# Patient Record
Sex: MALE | NOT HISPANIC OR LATINO | Employment: UNEMPLOYED | URBAN - METROPOLITAN AREA
[De-identification: names, ages, dates, MRNs, and addresses within clinical notes are randomized per-mention and may not be internally consistent; named-entity substitution may affect disease eponyms.]

---

## 2020-10-29 ENCOUNTER — OFFICE VISIT - RIVER FALLS (OUTPATIENT)
Dept: FAMILY MEDICINE | Facility: CLINIC | Age: 45
End: 2020-10-29

## 2020-11-05 ENCOUNTER — TRANSFERRED RECORDS (OUTPATIENT)
Dept: HEALTH INFORMATION MANAGEMENT | Facility: CLINIC | Age: 45
End: 2020-11-05

## 2020-11-17 ENCOUNTER — OFFICE VISIT - RIVER FALLS (OUTPATIENT)
Dept: FAMILY MEDICINE | Facility: CLINIC | Age: 45
End: 2020-11-17

## 2020-12-01 ENCOUNTER — OFFICE VISIT - RIVER FALLS (OUTPATIENT)
Dept: FAMILY MEDICINE | Facility: CLINIC | Age: 45
End: 2020-12-01

## 2020-12-01 ASSESSMENT — MIFFLIN-ST. JEOR: SCORE: 1341.88

## 2020-12-22 ENCOUNTER — AMBULATORY - RIVER FALLS (OUTPATIENT)
Dept: FAMILY MEDICINE | Facility: CLINIC | Age: 45
End: 2020-12-22

## 2020-12-25 LAB
A/G RATIO - HISTORICAL: 1.9 (ref 1–2.5)
ALBUMIN SERPL-MCNC: 4.3 GM/DL (ref 3.6–5.1)
ALP SERPL-CCNC: 56 UNIT/L (ref 31–125)
ALT SERPL W P-5'-P-CCNC: 9 UNIT/L (ref 6–29)
AST SERPL W P-5'-P-CCNC: 15 UNIT/L (ref 10–35)
BILIRUB SERPL-MCNC: 0.4 MG/DL (ref 0.2–1.2)
BUN SERPL-MCNC: 7 MG/DL (ref 7–25)
BUN/CREAT RATIO - HISTORICAL: NORMAL (ref 6–22)
CALCIUM SERPL-MCNC: 9.5 MG/DL (ref 8.6–10.2)
CHLORIDE BLD-SCNC: 100 MMOL/L (ref 98–110)
CHOLEST SERPL-MCNC: 148 MG/DL
CHOLEST/HDLC SERPL: 3.4 {RATIO}
CO2 SERPL-SCNC: 30 MMOL/L (ref 20–32)
CREAT SERPL-MCNC: 0.71 MG/DL (ref 0.5–1.1)
EGFRCR SERPLBLD CKD-EPI 2021: 101 ML/MIN/1.73M2
ESTRADIOL SERPL-MCNC: 863 PG/ML
GLOBULIN: 2.3 (ref 1.9–3.7)
GLUCOSE BLD-MCNC: 98 MG/DL (ref 65–99)
HDLC SERPL-MCNC: 43 MG/DL
LDLC SERPL CALC-MCNC: 85 MG/DL
NONHDLC SERPL-MCNC: 105 MG/DL
POTASSIUM BLD-SCNC: 4.1 MMOL/L (ref 3.5–5.3)
PROT SERPL-MCNC: 6.6 GM/DL (ref 6.1–8.1)
SODIUM SERPL-SCNC: 137 MMOL/L (ref 135–146)
TESTOSTERONE FREE: 0.5 PG/ML (ref 0.1–6.4)
TESTOSTERONE TOTAL: 12 NG/DL (ref 2–45)
TRIGL SERPL-MCNC: 103 MG/DL

## 2020-12-29 ENCOUNTER — OFFICE VISIT - RIVER FALLS (OUTPATIENT)
Dept: FAMILY MEDICINE | Facility: CLINIC | Age: 45
End: 2020-12-29

## 2020-12-29 ASSESSMENT — MIFFLIN-ST. JEOR: SCORE: 1357.31

## 2021-01-02 LAB
ALBUMIN SERPL-MCNC: 4.3 GM/DL (ref 3.6–5.1)
ESTRADIOL SERPL-MCNC: 1106 PG/ML
SEX HORMONE BINDING GLOBULIN: 141 NMOL/L (ref 17–124)
TESTOSTERONE FREE: 0.4 PG/ML (ref 0.2–5)
TESTOSTERONE TOTAL: 11 NG/DL (ref 2–45)
TESTOSTERONE, BIOAVAILABLE - QUEST: 0.7 NG/DL (ref 0.5–8.5)

## 2021-01-05 ENCOUNTER — COMMUNICATION - RIVER FALLS (OUTPATIENT)
Dept: FAMILY MEDICINE | Facility: CLINIC | Age: 46
End: 2021-01-05

## 2021-01-12 ENCOUNTER — OFFICE VISIT - RIVER FALLS (OUTPATIENT)
Dept: FAMILY MEDICINE | Facility: CLINIC | Age: 46
End: 2021-01-12

## 2021-01-12 ASSESSMENT — MIFFLIN-ST. JEOR: SCORE: 1375.45

## 2021-01-13 ENCOUNTER — COMMUNICATION - RIVER FALLS (OUTPATIENT)
Dept: FAMILY MEDICINE | Facility: CLINIC | Age: 46
End: 2021-01-13

## 2021-01-13 LAB — ESTRADIOL SERPL-MCNC: 545 PG/ML

## 2021-01-20 ENCOUNTER — AMBULATORY - RIVER FALLS (OUTPATIENT)
Dept: FAMILY MEDICINE | Facility: CLINIC | Age: 46
End: 2021-01-20

## 2021-01-21 ENCOUNTER — COMMUNICATION - RIVER FALLS (OUTPATIENT)
Dept: FAMILY MEDICINE | Facility: CLINIC | Age: 46
End: 2021-01-21

## 2021-01-21 LAB — ESTRADIOL SERPL-MCNC: 392 PG/ML

## 2021-01-26 ENCOUNTER — MEDICAL CORRESPONDENCE (OUTPATIENT)
Dept: HEALTH INFORMATION MANAGEMENT | Facility: CLINIC | Age: 46
End: 2021-01-26

## 2021-01-26 ENCOUNTER — OFFICE VISIT - RIVER FALLS (OUTPATIENT)
Dept: FAMILY MEDICINE | Facility: CLINIC | Age: 46
End: 2021-01-26

## 2021-01-26 ENCOUNTER — TRANSFERRED RECORDS (OUTPATIENT)
Dept: HEALTH INFORMATION MANAGEMENT | Facility: CLINIC | Age: 46
End: 2021-01-26

## 2021-01-27 LAB
A/G RATIO - HISTORICAL: 1.7 (ref 1–2.5)
ALBUMIN SERPL-MCNC: 4.3 GM/DL (ref 3.6–5.1)
ALP SERPL-CCNC: 55 UNIT/L (ref 31–125)
ALT SERPL W P-5'-P-CCNC: 7 UNIT/L (ref 6–29)
AST SERPL W P-5'-P-CCNC: 12 UNIT/L (ref 10–35)
BILIRUB DIRECT SERPL-MCNC: 0.1 MG/DL
BILIRUB INDIRECT SERPL-MCNC: 0.2 MG/DL (ref 0.2–1.2)
BILIRUB SERPL-MCNC: 0.3 MG/DL (ref 0.2–1.2)
ESTRADIOL SERPL-MCNC: 317 PG/ML
GLOBULIN: 2.6 (ref 1.9–3.7)
PROT SERPL-MCNC: 6.9 GM/DL (ref 6.1–8.1)

## 2021-01-28 ENCOUNTER — TELEPHONE (OUTPATIENT)
Dept: PLASTIC SURGERY | Facility: CLINIC | Age: 46
End: 2021-01-28

## 2021-01-28 DIAGNOSIS — F64.0 GENDER DYSPHORIA IN ADOLESCENT AND ADULT: Primary | ICD-10-CM

## 2021-01-28 NOTE — TELEPHONE ENCOUNTER
[facility]:  Care Coordination Note     SITUATION   Pt (Kim, she/her) is a 45 year old male who is receiving support for:  Care team.    BACKGROUND     Pt called regarding vaginoplasty consult. Discussed process, 2 LOS requirement. Pt to send letters and schedule consult.     ASSESSMENT     Surgery              [facility] Assessment  [facility] ([facility]) Enrollment: Enrolled  Patient has a therapist: Yes  Name of therapist: Fina Wright [place] in [place]  Letter of support #1: Requested  Letter of support #2: Requested  Surgery being considered: Yes  Vaginoplasty: Yes    Pt reports:    Smokes 5-10 cigarettes per day  No diabetes  HRT for 3 years  Previously underwent orchiectomy  Sees therapist Fina vanegas [facility] in [place]        PLAN          Nursing Interventions:  [facility] assessment completed    Follow-up plan:    1. Obtain 2 LOS    2. Schedule consult with Dr. Rosa Villarreal

## 2021-02-08 ENCOUNTER — TELEPHONE (OUTPATIENT)
Dept: PLASTIC SURGERY | Facility: CLINIC | Age: 46
End: 2021-02-08

## 2021-02-08 NOTE — TELEPHONE ENCOUNTER
Pt called regarding letters of support for vaginoplasty. Therapist Zeenat Hutson needed more information about what to include in letters of support. Pt wanted me to call on behalf of her and offer to send guideilines directly to therapist. Writer called, LVM with options on how writer could send guidelines to therapist, but did not mention the pt name due to no authorization on file to speak to therapist. Writer to send guidelines if therapist returns call.     Isis Villarreal

## 2021-02-10 ENCOUNTER — AMBULATORY - RIVER FALLS (OUTPATIENT)
Dept: FAMILY MEDICINE | Facility: CLINIC | Age: 46
End: 2021-02-10

## 2021-02-12 ENCOUNTER — OFFICE VISIT - RIVER FALLS (OUTPATIENT)
Dept: FAMILY MEDICINE | Facility: CLINIC | Age: 46
End: 2021-02-12

## 2021-02-17 ENCOUNTER — AMBULATORY - RIVER FALLS (OUTPATIENT)
Dept: FAMILY MEDICINE | Facility: CLINIC | Age: 46
End: 2021-02-17

## 2021-02-19 ENCOUNTER — AMBULATORY - RIVER FALLS (OUTPATIENT)
Dept: FAMILY MEDICINE | Facility: CLINIC | Age: 46
End: 2021-02-19

## 2021-02-20 LAB — ESTRADIOL SERPL-MCNC: 117 PG/ML

## 2021-02-23 ENCOUNTER — OFFICE VISIT - RIVER FALLS (OUTPATIENT)
Dept: FAMILY MEDICINE | Facility: CLINIC | Age: 46
End: 2021-02-23

## 2021-03-07 ENCOUNTER — HEALTH MAINTENANCE LETTER (OUTPATIENT)
Age: 46
End: 2021-03-07

## 2021-03-15 ENCOUNTER — AMBULATORY - RIVER FALLS (OUTPATIENT)
Dept: FAMILY MEDICINE | Facility: CLINIC | Age: 46
End: 2021-03-15

## 2021-03-16 LAB — ESTRADIOL SERPL-MCNC: 616 PG/ML

## 2021-03-23 ENCOUNTER — COMMUNICATION - RIVER FALLS (OUTPATIENT)
Dept: FAMILY MEDICINE | Facility: CLINIC | Age: 46
End: 2021-03-23

## 2021-03-24 ENCOUNTER — OFFICE VISIT - RIVER FALLS (OUTPATIENT)
Dept: FAMILY MEDICINE | Facility: CLINIC | Age: 46
End: 2021-03-24

## 2021-04-14 ENCOUNTER — AMBULATORY - RIVER FALLS (OUTPATIENT)
Dept: FAMILY MEDICINE | Facility: CLINIC | Age: 46
End: 2021-04-14

## 2021-04-15 ENCOUNTER — COMMUNICATION - RIVER FALLS (OUTPATIENT)
Dept: FAMILY MEDICINE | Facility: CLINIC | Age: 46
End: 2021-04-15

## 2021-04-15 LAB — ESTRADIOL SERPL-MCNC: 171 PG/ML

## 2021-04-20 ENCOUNTER — OFFICE VISIT - RIVER FALLS (OUTPATIENT)
Dept: FAMILY MEDICINE | Facility: CLINIC | Age: 46
End: 2021-04-20

## 2021-05-18 ENCOUNTER — AMBULATORY - RIVER FALLS (OUTPATIENT)
Dept: FAMILY MEDICINE | Facility: CLINIC | Age: 46
End: 2021-05-18

## 2021-05-19 LAB — ESTRADIOL SERPL-MCNC: 217 PG/ML

## 2021-05-21 ENCOUNTER — OFFICE VISIT - RIVER FALLS (OUTPATIENT)
Dept: FAMILY MEDICINE | Facility: CLINIC | Age: 46
End: 2021-05-21

## 2021-06-22 ENCOUNTER — MEDICAL CORRESPONDENCE (OUTPATIENT)
Dept: HEALTH INFORMATION MANAGEMENT | Facility: CLINIC | Age: 46
End: 2021-06-22

## 2021-07-30 ENCOUNTER — OFFICE VISIT - RIVER FALLS (OUTPATIENT)
Dept: FAMILY MEDICINE | Facility: CLINIC | Age: 46
End: 2021-07-30

## 2021-08-03 ENCOUNTER — MEDICAL CORRESPONDENCE (OUTPATIENT)
Dept: HEALTH INFORMATION MANAGEMENT | Facility: CLINIC | Age: 46
End: 2021-08-03

## 2021-08-03 ENCOUNTER — TRANSFERRED RECORDS (OUTPATIENT)
Dept: HEALTH INFORMATION MANAGEMENT | Facility: CLINIC | Age: 46
End: 2021-08-03

## 2021-08-05 ENCOUNTER — TELEPHONE (OUTPATIENT)
Dept: PLASTIC SURGERY | Facility: CLINIC | Age: 46
End: 2021-08-05

## 2021-08-05 ENCOUNTER — OFFICE VISIT - RIVER FALLS (OUTPATIENT)
Dept: FAMILY MEDICINE | Facility: CLINIC | Age: 46
End: 2021-08-05

## 2021-08-05 NOTE — TELEPHONE ENCOUNTER
Received a call from Dr. Kaylan Neville at [facility] regarding pt's 2 LOS for bottom surgery. Writer called Dr. Neville back, [facility], let them know that [facility] received one LOS from Dr. Vincent, don't have 2nd LOS. Writer send pt mychart message explaining that pt is onw aitlist to see Dr. Lucero. Once there are openings, pt can see Nic even if pt does not have 2nd LOS.     Isis Villarreal

## 2021-08-06 LAB — ESTRADIOL SERPL-MCNC: 50 PG/ML

## 2021-08-08 LAB
ALBUMIN SERPL-MCNC: 4.8 GM/DL (ref 3.6–5.1)
SEX HORMONE BINDING GLOBULIN: 124 NMOL/L (ref 17–124)
TESTOSTERONE FREE: 0.4 PG/ML (ref 0.2–5)
TESTOSTERONE TOTAL: 11 NG/DL (ref 2–45)
TESTOSTERONE, BIOAVAILABLE - QUEST: 0.9 NG/DL (ref 0.5–8.5)

## 2021-08-12 ENCOUNTER — OFFICE VISIT - RIVER FALLS (OUTPATIENT)
Dept: FAMILY MEDICINE | Facility: CLINIC | Age: 46
End: 2021-08-12

## 2021-09-03 ENCOUNTER — OFFICE VISIT - RIVER FALLS (OUTPATIENT)
Dept: FAMILY MEDICINE | Facility: CLINIC | Age: 46
End: 2021-09-03

## 2021-09-08 ENCOUNTER — AMBULATORY - RIVER FALLS (OUTPATIENT)
Dept: FAMILY MEDICINE | Facility: CLINIC | Age: 46
End: 2021-09-08

## 2021-09-09 LAB — ESTRADIOL SERPL-MCNC: 223 PG/ML

## 2021-09-10 ENCOUNTER — OFFICE VISIT - RIVER FALLS (OUTPATIENT)
Dept: FAMILY MEDICINE | Facility: CLINIC | Age: 46
End: 2021-09-10

## 2021-09-14 ENCOUNTER — COMMUNICATION - RIVER FALLS (OUTPATIENT)
Dept: FAMILY MEDICINE | Facility: CLINIC | Age: 46
End: 2021-09-14

## 2021-10-11 ENCOUNTER — HEALTH MAINTENANCE LETTER (OUTPATIENT)
Age: 46
End: 2021-10-11

## 2021-10-13 ENCOUNTER — TELEPHONE (OUTPATIENT)
Dept: PLASTIC SURGERY | Facility: CLINIC | Age: 46
End: 2021-10-13

## 2021-10-13 NOTE — TELEPHONE ENCOUNTER
Writer LVM for pt regarding scheduling with Nic for vaginoplasty consultation.     Isis Villarreal

## 2021-11-16 ENCOUNTER — TELEPHONE (OUTPATIENT)
Dept: PLASTIC SURGERY | Facility: CLINIC | Age: 46
End: 2021-11-16
Payer: COMMERCIAL

## 2021-11-16 NOTE — TELEPHONE ENCOUNTER
[facility] :  Care Coordination Note     SITUATION   Pt (Kim, she/her) is a 46 year old adult who is receiving support for:  No chief complaint on file.  .    BACKGROUND     Writer called pt regarding scheduling vaginoplasty consultation. Pt was previously on waitlist. Scheduled consultation with Nic as well as rbeast aug consultation with Issac.     ASSESSMENT     Surgery              C[facility] Assessment  [facility] [place]: En[place]  Patient has a therapist: Yes  Name of therapist: Fina cline [place]  Letter of support #1: (P) Received  Letter #1 Date: (P) 06/28/21  Letter of support #2: (P) Received  Letter #2 Date: (P) 08/12/21  Surgery being considered: (P) Yes  Augmentation: (P) Yes  Hormones at least 12mo: (P) Yes  Vaginoplasty: (P) Yes          PLAN          Nursing Interventions:  [facility] [place] completed    Follow-up plan:    1. Consultations       Isis Villarreal

## 2021-11-17 NOTE — TELEPHONE ENCOUNTER
FUTURE VISIT INFORMATION      FUTURE VISIT INFORMATION:    Date: 1/11/22    Time: 2:00pm    [place] [facility]  REFERRAL INFORMATION:    Referring provider:  self    Referring providers clinic:  N/A    Reason for visit/diagnosis  new vaginoplasty, needs hair removal referral    RECORDS REQUESTED FROM:       No recs to collect

## 2021-11-17 NOTE — TELEPHONE ENCOUNTER
FUTURE VISIT INFORMATION      FUTURE VISIT INFORMATION:    Date: 1/23/22    Time: 12:30pm    Location: [facility] [place] [facility]  REFERRAL INFORMATION:    Referring provider:  self    Referring providers clinic:  N/A    Reason for visit/diagnosis  Top consult    RECORDS REQUESTED FROM:       No recs to collect

## 2021-11-18 ENCOUNTER — OFFICE VISIT - RIVER FALLS (OUTPATIENT)
Dept: FAMILY MEDICINE | Facility: CLINIC | Age: 46
End: 2021-11-18

## 2021-11-19 LAB — ESTRADIOL SERPL-MCNC: 32 PG/ML

## 2021-11-26 ENCOUNTER — OFFICE VISIT - RIVER FALLS (OUTPATIENT)
Dept: FAMILY MEDICINE | Facility: CLINIC | Age: 46
End: 2021-11-26

## 2021-11-27 LAB — ESTRADIOL SERPL-MCNC: 60 PG/ML

## 2021-11-30 ENCOUNTER — COMMUNICATION - RIVER FALLS (OUTPATIENT)
Dept: FAMILY MEDICINE | Facility: CLINIC | Age: 46
End: 2021-11-30

## 2021-12-03 ENCOUNTER — OFFICE VISIT - RIVER FALLS (OUTPATIENT)
Dept: FAMILY MEDICINE | Facility: CLINIC | Age: 46
End: 2021-12-03

## 2021-12-05 ENCOUNTER — HEALTH MAINTENANCE LETTER (OUTPATIENT)
Age: 46
End: 2021-12-05

## 2022-01-07 ENCOUNTER — AMBULATORY - RIVER FALLS (OUTPATIENT)
Dept: FAMILY MEDICINE | Facility: CLINIC | Age: 47
End: 2022-01-07
Payer: COMMERCIAL

## 2022-01-08 LAB — ESTRADIOL SERPL-MCNC: 71 PG/ML

## 2022-01-11 ENCOUNTER — OFFICE VISIT (OUTPATIENT)
Dept: PLASTIC SURGERY | Facility: CLINIC | Age: 47
End: 2022-01-11
Payer: COMMERCIAL

## 2022-01-11 ENCOUNTER — PRE VISIT (OUTPATIENT)
Dept: PLASTIC SURGERY | Facility: CLINIC | Age: 47
End: 2022-01-11
Payer: COMMERCIAL

## 2022-01-11 VITALS
SYSTOLIC BLOOD PRESSURE: 107 MMHG | WEIGHT: 142 LBS | BODY MASS INDEX: 22.29 KG/M2 | HEIGHT: 67 IN | OXYGEN SATURATION: 96 % | HEART RATE: 86 BPM | DIASTOLIC BLOOD PRESSURE: 67 MMHG

## 2022-01-11 DIAGNOSIS — F64.9 GENDER DYSPHORIA: Primary | ICD-10-CM

## 2022-01-11 PROCEDURE — 99205 OFFICE O/P NEW HI 60 MIN: CPT | Performed by: UROLOGY

## 2022-01-11 RX ORDER — ESTRADIOL 0.5 MG/1
0.5 TABLET ORAL DAILY
COMMUNITY
End: 2022-03-31

## 2022-01-11 ASSESSMENT — PAIN SCALES - GENERAL: PAINLEVEL: NO PAIN (0)

## 2022-01-11 ASSESSMENT — MIFFLIN-ST. JEOR: SCORE: 1472.74

## 2022-01-11 NOTE — PROGRESS NOTES
[facility] Consult H&P    Name: Madi Padilla    MRN: [id number]   YOB: 1975                 Chief Complaint:   Gender Dysphoria          History of Present Illness:   Madi Padilla is a 46 year old transgender female seen in consultation for gender dysphoria    Patient has been living as a female for 5 years.  Preferred pronouns are: she/her  The patient has been on exogenous hormones since: 6-7 years.  In terms of an intimate relationship, the patient is in a relationship with a transfemale.  In terms of fertility, the patient: prior orchi    The patient has obtained two letters of support dated August 2021    The patient has previously undergone orchiectomy many years ago. This was performed in [place].    Long-term surgical goals for the patient include: vaginoplasty    The patient is here today expressing interest in vaginoplasty.         Past Medical History:   No past medical history on file.         Past Surgical History:   No past surgical history on file.         Social History:     Social History     Tobacco Use     Smoking status: Not on file     Smokeless tobacco: Not on file   Substance Use Topics     Alcohol use: Not on file            Family History:   No family history on file.         Allergies:   Not on File         Medications:     No current outpatient medications on file.     No current facility-administered medications for this visit.          Physical Exam:   There were no vitals taken for this visit.  General: age-appropriate in NAD  HEENT: Head AT/NC, EOMI, CN Grossly intact  Resp: no respiratory distress, lung sounds clear.  CV: heart rate regular, S1, S2.  Lymph: No cervical, supraclavicular or axillary lymphadenopathy  Back: bony spine is non-tender, flanks are nontender  Abdomen: notobese, soft, non-distended, non-tender. No organomegaly  : circumcised, descended testicles. Hirsute genitalia.  LE: no edema.   Neuro: grossly intact  Motor:  excellent strength throughout  Skin: clear of rashes or ecchymoses.        Labs:    All laboratory data reviewed with patient  Significant for none      Office Studies:    none      Imaging:    All imaging reviewed with patient.  Significant for none      Outside records:    I spent 10 minutes reviewing outside records.         Assessment and Plan:   46 year old transgender female with:  1. Gender Dysphoria    Patient desires vaginoplasty however is not sure if she would prefer a minimal depth or full depth. She has already had an orchiectomy.    She has a persistent, well documented gender dysphoria. She has capacity to make a fully informed decision and to consent for treatment. Her mental health issues are well controlled. She has been on continuous hormones for years. She two letters of support.     The patient meets all of these criteria. We discussed that gender affirmation surgery should be considered permanent. We discussed risks/complications of rectal injury, rectovaginal fistula, bleeding, fluid collection, infection, injury to surrounding structures, flap loss, sensory loss, wound dehiscence, vaginal prolapse, vaginal shrinkage/stenosis, need for lifelong dilation, urinary stream abnormalities, DVT/PE and need for revision surgery.     We also discussed the need to stop hormones sary-procedurally for 2 weeks before and after surgery.     We discussed that transgender vaginoplasty for this patient would include: penectomy,  clitoroplasty, labiaplasty, urethral reconstruction, creation of a vagina, skin graft, colpopexy to suspend the vagina, and scrotectomy.     needs hair removal if wants to do full depth    needs prior auth    Is going to see plastics for breast reconstruction tomorrow. She would proceed with top surgery prior to bottom surgery.     Follow up in 6 months after top surgery to discuss again.  Leaning towards minimal depth after today's discussion. Should have top surgery first    Also  seeing her partner, a transgender woman who also wants a minimal depth vaginoplasty.    Golden Lucero MD   Reconstructive Urology  [facility]      60 minutes spent on the date of the encounter doing chart review, history and exam, documentation and further activities per the note

## 2022-01-11 NOTE — LETTER
Date:January 26, 2022      Patient was self referred, no letter generated. Do not send.        [facility] Health Information

## 2022-01-11 NOTE — LETTER
1/11/2022       RE: Madi Padilla  [address]  [place] [address]     Dear Colleague,    Thank you for referring your patient, Madi Padilla, to the [facility] PLASTIC AND RECONSTRUCTIVE SURGERY CLINIC [place] at [facility]. Please see a copy of my visit note below.    [facility] Consult H&P    Name: Madi Padilla    MRN: [id number]   YOB: 1975                 Chief Complaint:   Gender Dysphoria          History of Present Illness:   Madi Padilla is a 46 year old transgender female seen in consultation for gender dysphoria    Patient has been living as a female for 5 years.  Preferred pronouns are: she/her  The patient has been on exogenous hormones since: 6-7 years.  In terms of an intimate relationship, the patient is in a relationship with a transfemale.  In terms of fertility, the patient: prior orchi    The patient has obtained two letters of support dated August 2021    The patient has previously undergone orchiectomy many years ago. This was performed in [place].    Long-term surgical goals for the patient include: vaginoplasty    The patient is here today expressing interest in vaginoplasty.         Past Medical History:   No past medical history on file.         Past Surgical History:   No past surgical history on file.         Social History:     Social History     Tobacco Use     Smoking status: Not on file     Smokeless tobacco: Not on file   Substance Use Topics     Alcohol use: Not on file            Family History:   No family history on file.         Allergies:   Not on File         Medications:     No current outpatient medications on file.     No current facility-administered medications for this visit.          Physical Exam:   There were no vitals taken for this visit.  General: age-appropriate in NAD  HEENT: Head AT/NC, EOMI, CN Grossly intact  Resp: no respiratory distress,  lung sounds clear.  CV: heart rate regular, S1, S2.  Lymph: No cervical, supraclavicular or axillary lymphadenopathy  Back: bony spine is non-tender, flanks are nontender  Abdomen: notobese, soft, non-distended, non-tender. No organomegaly  : circumcised, descended testicles. Hirsute genitalia.  LE: no edema.   Neuro: grossly intact  Motor: excellent strength throughout  Skin: clear of rashes or ecchymoses.        Labs:    All laboratory data reviewed with patient  Significant for none      Office Studies:    none      Imaging:    All imaging reviewed with patient.  Significant for none      Outside records:    I spent 10 minutes reviewing outside records.         Assessment and Plan:   46 year old transgender female with:  1. Gender Dysphoria    Patient desires vaginoplasty however is not sure if she would prefer a minimal depth or full depth. She has already had an orchiectomy.    She has a persistent, well documented gender dysphoria. She has capacity to make a fully informed decision and to consent for treatment. Her mental health issues are well controlled. She has been on continuous hormones for years. She two letters of support.     The patient meets all of these criteria. We discussed that gender affirmation surgery should be considered permanent. We discussed risks/complications of rectal injury, rectovaginal fistula, bleeding, fluid collection, infection, injury to surrounding structures, flap loss, sensory loss, wound dehiscence, vaginal prolapse, vaginal shrinkage/stenosis, need for lifelong dilation, urinary stream abnormalities, DVT/PE and need for revision surgery.     We also discussed the need to stop hormones sary-procedurally for 2 weeks before and after surgery.     We discussed that transgender vaginoplasty for this patient would include: penectomy,  clitoroplasty, labiaplasty, urethral reconstruction, creation of a vagina, skin graft, colpopexy to suspend the vagina, and scrotectomy.      needs hair removal if wants to do full depth    needs prior auth    Is going to see plastics for breast reconstruction tomorrow. She would proceed with top surgery prior to bottom surgery.     Follow up in 6 months after top surgery to discuss again.  Leaning towards minimal depth after today's discussion. Should have top surgery first    Also seeing her partner, a transgender woman who also wants a minimal depth vaginoplasty.    Golden Lucero MD   Reconstructive Urology  [facility]      60 minutes spent on the date of the encounter doing chart review, history and exam, documentation and further activities per the note                   Again, thank you for allowing me to participate in the care of your patient.      Sincerely,    Golden Lucero MD

## 2022-01-11 NOTE — NURSING NOTE
"Chief Complaint   Patient presents with     Consult     New pt consult -- vaginoplasty       Vitals:    01/11/22 1307   BP: 107/67   Pulse: 86   SpO2: 96%   Weight: 64.4 kg (142 lb)   Height: 1.702 m (5' 7\")       Body mass index is 22.24 kg/m .          DUY ANTOINE EMT    "

## 2022-01-12 ENCOUNTER — OFFICE VISIT (OUTPATIENT)
Dept: PLASTIC SURGERY | Facility: CLINIC | Age: 47
End: 2022-01-12
Payer: COMMERCIAL

## 2022-01-12 ENCOUNTER — PRE VISIT (OUTPATIENT)
Dept: PLASTIC SURGERY | Facility: CLINIC | Age: 47
End: 2022-01-12

## 2022-01-12 VITALS
DIASTOLIC BLOOD PRESSURE: 71 MMHG | HEIGHT: 67 IN | SYSTOLIC BLOOD PRESSURE: 101 MMHG | WEIGHT: 142 LBS | OXYGEN SATURATION: 100 % | BODY MASS INDEX: 22.29 KG/M2 | HEART RATE: 86 BPM

## 2022-01-12 DIAGNOSIS — F64.9 GENDER DYSPHORIA: Primary | ICD-10-CM

## 2022-01-12 PROCEDURE — 99203 OFFICE O/P NEW LOW 30 MIN: CPT | Performed by: STUDENT IN AN ORGANIZED HEALTH CARE EDUCATION/TRAINING PROGRAM

## 2022-01-12 ASSESSMENT — MIFFLIN-ST. JEOR: SCORE: 1472.74

## 2022-01-12 ASSESSMENT — PAIN SCALES - GENERAL: PAINLEVEL: NO PAIN (0)

## 2022-01-12 NOTE — LETTER
Date:January 27, 2022      Patient was self referred, no letter generated. Do not send.        [facility] Health Information

## 2022-01-12 NOTE — PROGRESS NOTES
"PRS    HPI: 46-year-old transfemale presenting for chest feminization consult.  Patient has been socially transitioning for greater than 10 years, and on estradiol hormone replacement therapy for 6 years.  They have a therapist with a letter of support for chest surgery.  Patient denies any nipple discharge, masses or lumps, or armpit swelling.  She has not had any prior breast surgery.  No recent mammogram.  Patient is a 0.5 pack/day smoker, but is willing to quit.  She is in the clinic room with her partner who is also transfemale.  Goals for chest feminization surgery in the patient's word include making a more body appropriate breast mound and hopefully around the size of a C cup.  Of note, believes that her breast growth has plateaued for the last several years.    ROS: Negative, see HPI  Past medical history: Gender dysphoria, asthma  Past surgical history: No surgeries to the breast or chest  Medications: Albuterol, estradiol  Allergies: Penicillins  Social history: 0.5 pack/day smoker, but no other nicotine or tobacco use  Family history: No bleeding or clotting problems, or issues with anesthesia    Examination:  /71 (BP Location: Left arm, Patient Position: Sitting, Cuff Size: Adult Regular)   Pulse 86   Ht 1.702 m (5' 7\")   Wt 64.4 kg (142 lb)   SpO2 100%   BMI 22.24 kg/m    Nonlabored breathing  Not distressed  No masses, lumps, nipple discharge, skin changes, or axillary lymphadenopathy bilaterally  Bilateral grade 1 breasts with good growth  Breast measurements:  Sternal notch nipple: 19.5 cm on the left, 19 cm on the right  Nipple inframammary distance: 4 cm on the left, 4.5 cm on the right  Breast base width: 12 cm bilaterally  Nipple midline: 9.5 cm on the left, 9 cm on the right  Areolar diameter: 3 cm in the left, 2.5 cm on the right    No mammography    A/P: 46-year-old transfemale presenting for chest feminization consult    -Patient is a good candidate for bilateral feminizing breast " augmentation with silicone smooth round gel implants in the partial subpectoral pocket with dual planing.  However, patient needs to quit smoking.  Patient is agreeable to quit smoking.  Patient understands the increased risks of infection and wound healing complications in the setting of nicotine use.  Patient is agreeable to quit and to return to clinic 6 weeks after last nicotine use for urinary cotinine screening.  -Screening mammogram has been ordered  -Return to clinic after smoking cessation  -A total of 30 minutes was devoted to review of chart, direct face-to-face patient counseling and documentation during this encounter    Osman Davenport MD, PhD

## 2022-01-12 NOTE — LETTER
"1/12/2022       RE: Madi Padilla  [address]  [place] [address]     Dear Colleague,    Thank you for referring your patient, Madi Padilla, to the [facility] PLASTIC AND RECONSTRUCTIVE SURGERY CLINIC [place] at [facility]. Please see a copy of my visit note below.    PRS    HPI: 46-year-old transfemale presenting for chest feminization consult.  Patient has been socially transitioning for greater than 10 years, and on estradiol hormone replacement therapy for 6 years.  They have a therapist with a letter of support for chest surgery.  Patient denies any nipple discharge, masses or lumps, or armpit swelling.  She has not had any prior breast surgery.  No recent mammogram.  Patient is a 0.5 pack/day smoker, but is willing to quit.  She is in the clinic room with her partner who is also transfemale.  Goals for chest feminization surgery in the patient's word include making a more body appropriate breast mound and hopefully around the size of a C cup.  Of note, believes that her breast growth has plateaued for the last several years.    ROS: Negative, see HPI  Past medical history: Gender dysphoria, asthma  Past surgical history: No surgeries to the breast or chest  Medications: Albuterol, estradiol  Allergies: Penicillins  Social history: 0.5 pack/day smoker, but no other nicotine or tobacco use  Family history: No bleeding or clotting problems, or issues with anesthesia    Examination:  /71 (BP Location: Left arm, Patient Position: Sitting, Cuff Size: Adult Regular)   Pulse 86   Ht 1.702 m (5' 7\")   Wt 64.4 kg (142 lb)   SpO2 100%   BMI 22.24 kg/m    Nonlabored breathing  Not distressed  No masses, lumps, nipple discharge, skin changes, or axillary lymphadenopathy bilaterally  Bilateral grade 1 breasts with good growth  Breast measurements:  Sternal notch nipple: 19.5 cm on the left, 19 cm on the right  Nipple inframammary distance: 4 " cm on the left, 4.5 cm on the right  Breast base width: 12 cm bilaterally  Nipple midline: 9.5 cm on the left, 9 cm on the right  Areolar diameter: 3 cm in the left, 2.5 cm on the right    No mammography    A/P: 46-year-old transfemale presenting for chest feminization consult    -Patient is a good candidate for bilateral feminizing breast augmentation with silicone smooth round gel implants in the partial subpectoral pocket with dual planing.  However, patient needs to quit smoking.  Patient is agreeable to quit smoking.  Patient understands the increased risks of infection and wound healing complications in the setting of nicotine use.  Patient is agreeable to quit and to return to clinic 6 weeks after last nicotine use for urinary cotinine screening.  -Screening mammogram has been ordered  -Return to clinic after smoking cessation  -A total of 30 minutes was devoted to review of chart, direct face-to-face patient counseling and documentation during this encounter    Osman Davenport MD, PhD      Again, thank you for allowing me to participate in the care of your patient.      Sincerely,    Osman Davenport MD

## 2022-01-12 NOTE — NURSING NOTE
"Chief Complaint   Patient presents with     Consult     gender affirming breast augmentation       Vitals:    01/12/22 1222   BP: 101/71   BP Location: Left arm   Patient Position: Sitting   Cuff Size: Adult Regular   Pulse: 86   SpO2: 100%   Weight: 64.4 kg (142 lb)   Height: 1.702 m (5' 7\")       Body mass index is 22.24 kg/m .          DUY ANTOINE EMT    "

## 2022-01-17 ENCOUNTER — AMBULATORY - RIVER FALLS (OUTPATIENT)
Dept: FAMILY MEDICINE | Facility: CLINIC | Age: 47
End: 2022-01-17
Payer: COMMERCIAL

## 2022-01-20 ENCOUNTER — ANCILLARY PROCEDURE (OUTPATIENT)
Dept: MAMMOGRAPHY | Facility: CLINIC | Age: 47
End: 2022-01-20
Attending: STUDENT IN AN ORGANIZED HEALTH CARE EDUCATION/TRAINING PROGRAM
Payer: COMMERCIAL

## 2022-01-20 DIAGNOSIS — F64.9 GENDER DYSPHORIA: ICD-10-CM

## 2022-01-20 PROCEDURE — 77063 BREAST TOMOSYNTHESIS BI: CPT | Mod: TC | Performed by: RADIOLOGY

## 2022-01-20 PROCEDURE — 77067 SCR MAMMO BI INCL CAD: CPT | Mod: TC | Performed by: RADIOLOGY

## 2022-01-28 DIAGNOSIS — F64.0 GENDER DYSPHORIA IN ADOLESCENT AND ADULT: Primary | ICD-10-CM

## 2022-02-11 ENCOUNTER — AMBULATORY - RIVER FALLS (OUTPATIENT)
Dept: FAMILY MEDICINE | Facility: CLINIC | Age: 47
End: 2022-02-11
Payer: COMMERCIAL

## 2022-02-11 VITALS
DIASTOLIC BLOOD PRESSURE: 64 MMHG | DIASTOLIC BLOOD PRESSURE: 70 MMHG | SYSTOLIC BLOOD PRESSURE: 126 MMHG | OXYGEN SATURATION: 99 % | SYSTOLIC BLOOD PRESSURE: 120 MMHG | WEIGHT: 148 LBS | DIASTOLIC BLOOD PRESSURE: 60 MMHG | DIASTOLIC BLOOD PRESSURE: 70 MMHG | HEART RATE: 86 BPM | TEMPERATURE: 98 F | HEART RATE: 72 BPM | WEIGHT: 155.4 LBS | HEART RATE: 100 BPM | TEMPERATURE: 98.1 F | BODY MASS INDEX: 22.43 KG/M2 | HEIGHT: 68 IN | HEIGHT: 68 IN | OXYGEN SATURATION: 97 % | BODY MASS INDEX: 22.31 KG/M2 | BODY MASS INDEX: 23.55 KG/M2 | BODY MASS INDEX: 22.94 KG/M2 | HEART RATE: 85 BPM | WEIGHT: 151.4 LBS | SYSTOLIC BLOOD PRESSURE: 110 MMHG | SYSTOLIC BLOOD PRESSURE: 104 MMHG | HEIGHT: 68 IN | WEIGHT: 144.6 LBS | OXYGEN SATURATION: 98 %

## 2022-02-12 VITALS
OXYGEN SATURATION: 99 % | DIASTOLIC BLOOD PRESSURE: 70 MMHG | OXYGEN SATURATION: 98 % | HEART RATE: 73 BPM | WEIGHT: 152.4 LBS | HEART RATE: 94 BPM | DIASTOLIC BLOOD PRESSURE: 80 MMHG | RESPIRATION RATE: 16 BRPM | SYSTOLIC BLOOD PRESSURE: 110 MMHG | BODY MASS INDEX: 24.23 KG/M2 | OXYGEN SATURATION: 98 % | WEIGHT: 152 LBS | BODY MASS INDEX: 23.46 KG/M2 | WEIGHT: 157 LBS | RESPIRATION RATE: 16 BRPM | BODY MASS INDEX: 23.52 KG/M2 | HEART RATE: 68 BPM | SYSTOLIC BLOOD PRESSURE: 104 MMHG | RESPIRATION RATE: 16 BRPM | DIASTOLIC BLOOD PRESSURE: 64 MMHG | SYSTOLIC BLOOD PRESSURE: 104 MMHG

## 2022-02-12 VITALS
WEIGHT: 143 LBS | HEART RATE: 76 BPM | SYSTOLIC BLOOD PRESSURE: 98 MMHG | OXYGEN SATURATION: 97 % | BODY MASS INDEX: 23.21 KG/M2 | OXYGEN SATURATION: 97 % | BODY MASS INDEX: 22.07 KG/M2 | WEIGHT: 150.4 LBS | RESPIRATION RATE: 16 BRPM | HEART RATE: 69 BPM | DIASTOLIC BLOOD PRESSURE: 78 MMHG | DIASTOLIC BLOOD PRESSURE: 60 MMHG | OXYGEN SATURATION: 97 % | SYSTOLIC BLOOD PRESSURE: 110 MMHG | HEART RATE: 97 BPM | RESPIRATION RATE: 16 BRPM | SYSTOLIC BLOOD PRESSURE: 98 MMHG | WEIGHT: 143.4 LBS | DIASTOLIC BLOOD PRESSURE: 60 MMHG | RESPIRATION RATE: 16 BRPM | BODY MASS INDEX: 22.13 KG/M2

## 2022-02-12 VITALS
RESPIRATION RATE: 16 BRPM | OXYGEN SATURATION: 98 % | WEIGHT: 138.5 LBS | HEART RATE: 90 BPM | DIASTOLIC BLOOD PRESSURE: 60 MMHG | WEIGHT: 135.8 LBS | BODY MASS INDEX: 20.96 KG/M2 | RESPIRATION RATE: 16 BRPM | HEART RATE: 81 BPM | DIASTOLIC BLOOD PRESSURE: 54 MMHG | SYSTOLIC BLOOD PRESSURE: 110 MMHG | BODY MASS INDEX: 21.37 KG/M2 | OXYGEN SATURATION: 98 % | SYSTOLIC BLOOD PRESSURE: 100 MMHG

## 2022-02-12 VITALS
HEART RATE: 78 BPM | WEIGHT: 143.6 LBS | SYSTOLIC BLOOD PRESSURE: 120 MMHG | DIASTOLIC BLOOD PRESSURE: 80 MMHG | BODY MASS INDEX: 22.16 KG/M2 | RESPIRATION RATE: 16 BRPM | OXYGEN SATURATION: 93 %

## 2022-02-12 LAB — ESTRADIOL SERPL-MCNC: 140 PG/ML

## 2022-02-15 ENCOUNTER — DOCUMENTATION ONLY (OUTPATIENT)
Dept: PLASTIC SURGERY | Facility: CLINIC | Age: 47
End: 2022-02-15
Payer: COMMERCIAL

## 2022-02-15 NOTE — PROGRESS NOTES
Order for urine cotinine and nicotine faxed to [facility]:  [phone] this morning per pt request.   Gray DIAMOND RN
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
## 2022-02-16 NOTE — LETTER
(Inserted Image. Unable to display)            September 14, 2021        ELTON FARRELL   [address] [place] [address]        Dear ELTON,     Thank you for selecting [facility] for your healthcare needs. Below you will find the results of your recent test(s) done at our clinic.      This looks good!        Result Name Current Result Previous Result   Estradiol Level (pg/mL)  223 9/8/2021  50 8/5/2021     Please contact my practice at [phone] if you have any questions or concerns.     Sincerely,        Kaylan Neville MD      What do your labs mean?  Below is a glossary of commonly ordered labs:  LDL   Bad Cholesterol   HDL   Good Cholesterol  AST/ALT   Liver Function   Cr/Creatinine   Kidney Function  Microalbumin   Kidney Function  BUN   Kidney Function  PSA   Prostate    TSH   Thyroid Hormone  HgbA1c   Diabetes Test   Hgb (Hemoglobin)   Red Blood Cells

## 2022-02-16 NOTE — TELEPHONE ENCOUNTER
Entered by Malka Rothman CMA on June 16, 2021 10:10:23 AM CDT  ---------------------  From: Malka Rothman CMA   To: [facility] #[id number]    Sent: 6/16/2021 10:10:23 AM CDT  Subject: Medication Management     ** Submitted: **  Order:fluticasone (Flovent  mcg/inh inhalation aerosol)  See Instructions  INHALE 2 PUFFS BY MOUTH TWICE DAILY  Qty:  1 EA        Refills:  0          Substitutions Allowed     Route To [id number]    Signed by Malka Rothman CMA  6/16/2021 3:10:00 PM UT    ** Submitted: **  Complete:fluticasone (Flovent  mcg/inh inhalation aerosol)   Signed by Malka Rothman CMA  6/16/2021 3:10:00 PM UT    ** Not Approved:  **  fluticasone (FLOVENT  MCG ORAL INH 120INH)  INHALE 2 PUFFS BY MOUTH TWICE DAILY  Qty:  12 gm        Days Supply:  30        Refills:  0          Substitutions Allowed     Route To [facility] #[id number]   Signed by Malka Rothman CMA            ** Submitted: **  Order:albuterol (Albuterol (Eqv-Proventil HFA) 90 mcg/inh inhalation aerosol)  See Instructions  INHALE TWO PUFFS BY MOUTH FOUR TIMES DAILY AS NEEDED FOR WHEEZING  Qty:  1 EA        Refills:  0          Substitutions Allowed     Route To [facility] #[id number]    Signed by Malka Rothman CMA  6/16/2021 3:09:00 PM UT    ** Submitted: **  Complete:albuterol (Albuterol (Eqv-Proventil HFA) 90 mcg/inh inhalation aerosol)   Signed by Malka Rothman CMA  6/16/2021 3:10:00 PM UT    ** Not Approved:  **  albuterol (ALBUTEROL HFA INH (200 PUFFS)6.7GM)  INHALE TWO PUFFS BY MOUTH FOUR TIMES DAILY AS NEEDED FOR WHEEZING  Qty:  6.7 gm        Days Supply:  25        Refills:  0          Substitutions Allowed     Route To [facility] #[id number]   Signed by Malka Rothman CMA            PCP:  [facility]    Medication:  albuterol  Last Filled:   5/26/21  Quantity:   6.7gm Refills:  0    Date of last office visit and reason:  5/21/21     Return to Clinic  order placed?          ------------------------------------------  From: [facility] #[id number]  To: Kaylan Neville MD  Sent: Che 15, 2021 12:20:16 PM CDT  Subject: Medication Management  Due: June 4, 2021 8:25:53 PM CDT     ** On Hold Pending Signature **     Dispensed Drug: albuterol (Albuterol (Eqv-Proventil HFA) 90 mcg/inh inhalation aerosol), INHALE TWO PUFFS BY MOUTH FOUR TIMES DAILY AS NEEDED FOR WHEEZING  Quantity: 6.7 gm  Days Supply: 25  Refills: 0  Substitutions Allowed  Notes from Pharmacy:     ** On Hold Pending Signature **     Dispensed Drug: fluticasone (Flovent  mcg/inh inhalation aerosol), INHALE 2 PUFFS BY MOUTH TWICE DAILY  Quantity: 12 gm  Days Supply: 30  Refills: 0  Substitutions Allowed  Notes from Pharmacy:  ------------------------------------------

## 2022-02-16 NOTE — TELEPHONE ENCOUNTER
---------------------  From: Kaylan Neville MD   To: [facility] Message Pool ([id number]);     Sent: 3/23/2021 10:13:50 PM CDT  Subject: General Message     please invite pt to schedule a follow up appointment        Results:  Date Result Name Ind Value   3/16/2021 8:03 AM Estradiol Level ((H)) 616 pg/mL

## 2022-02-16 NOTE — LETTER
(Inserted Image. Unable to display)   September 13, 2021    ELTON FARRELL   [address] [place] [address]            Dear ELTON,      Thank you for selecting [facility] for your healthcare needs.    Our records indicate you are due for the following services:     Follow-up office visit.    (FYI   Regarding office visits: In some instances, a video visit or telephone visit may be offered as an option.)      To schedule an appointment or if you have further questions, please contact your clinic at [phone].      Powered by [employer]    Sincerely,    Kaylan Neville MD

## 2022-02-16 NOTE — LETTER
(Inserted Image. Unable to display)     February 15, 2021      ELTON FARRELL   [address] [id number]          Dear ELTON,      Thank you for selecting [facility] for your healthcare needs.    Our records indicate you are due for the following services:    Follow-up office visit.    Non-Fasting Labs.    If you had your labs done at another facility or with [facility] at [facility], please bring in a copy of the results to your next visit, mail a copy, or drop off a copy of your results to your Healthcare Provider.    (FYI   Regarding office visits: In some instances, a video visit or telephone visit may be offered as an option.)    You are due for lab work and an office visit; please schedule the lab appointment 1 week before the office visit.  This will assure all results are available to discuss with your Healthcare Provider during your visit.    **It is very helpful if you bring your medication bottles to your appointment.  This assures we have all of your current medications, including strength and dosing information, documented accurately in your medical record.    To schedule an appointment or if you have further questions, please contact your clinic at [phone].      Powered by [facility]    Sincerely,    Kaylan Neville MD
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
## 2022-02-16 NOTE — LETTER
(Inserted Image. Unable to display) January 28, 2021Re: ELTON WADEUDOB:  1975Golden Lucero M.D.[address] [place] [id number]: Dr. Quintanilla following patient has been referred to your office/practice:   ELTON FARRELL Appointment : PendingLocation : [place], [place] refer to the attached clinical documentation for a summary of ELTON's care.  Please do not hesitate to contact our office if any additional clinical questions arise. All relevant records and transition of care documents should be mailed or faxed.Your assistance in providing continuity of care is appreciatedSincerely, Formerly [facility], [facility] & [address] [address](P) [phone](F) [phone]

## 2022-02-16 NOTE — TELEPHONE ENCOUNTER
---------------------  From: Estella Morris LPN (Phone Messages Pool ([id number]_[facility]))   To: [facility] Message Pool ([id number]_[place] [facility] [place]);     Sent: 12/1/2020 10:40:51 AM CST  Subject: General Message     Phone Message    PCP:   [facility]      Time of Call:  10:15am       Person Calling:  Fina Melisa  Phone number:  [phone]    Returned call at: 10:30am    Note:   Fina STEVENSON requesting to speak to [facility] about a couple  mutual clients.  Best time to call 12-1pm or 2-5pm.    Returned call to get pt info.     Last office visit and reason:---------------------  From: Milagros Abraham CMA ([facility] Message Pool ([id number]_[facility]))   To: Kaylan Neville MD;     Sent: 12/1/2020 11:10:36 AM CST  Subject: FW: General Message---------------------  From: Kaylan Neville MD   To: [facility] Message Pool ([id number]_[place] [facility] [place]);     Sent: 12/2/2020 8:54:25 PM CST  Subject: RE: General Message     left message with my phone number for her to reach me

## 2022-02-16 NOTE — NURSING NOTE
Depression Screening Entered On:  12/10/2020 12:17 PM CST    Performed On:  12/1/2020 12:16 PM CST by Loren Guerrero               Depression Screening   Little Interest - Pleasure in Activities :   Not at all   Feeling Down, Depressed, Hopeless :   Not at all   Initial Depression Screen Score :   0 Score   Poor Appetite or Overeating :   Not at all   Trouble Falling or Staying Asleep :   Not at all   Feeling Tired or Little Energy :   Not at all   Feeling Bad About Yourself :   More than half the days   Trouble Concentrating :   Not at all   Moving or Speaking Slowly :   More than half the days   Thoughts Better Off Dead or Hurting Self :   Not at all   JOSE ANTONIO Difficulty with Work, Home, Others :   Somewhat difficult   Detailed Depression Screen Score :   4    Total Depression Screen Score :   4    Loren Guerrero - 12/10/2020 12:16 PM CST

## 2022-02-16 NOTE — TELEPHONE ENCOUNTER
---------------------  From: Kaylan Neville MD   To: [facility] Message Pool ([id number]_[place] [facility] [place]);     Sent: 8/18/2021 9:13:04 PM CDT  Subject: General Message     pls lwt Kim know her Xray does not show any signs of disc disease.  I think she needs PT and to stop smoking. Please send PT eval and treat to location of her choice if she is willing to do PTPt notified and states understanding.

## 2022-02-16 NOTE — NURSING NOTE
Comprehensive Intake Entered On:  8/12/2021 10:06 AM CDT    Performed On:  8/12/2021 10:02 AM CDT by Lynne Jansen               Summary   Chief Complaint :   Lab level follow up   Weight Measured :   143 lb(Converted to: 143 lb 0 oz, 64.864 kg)    Systolic Blood Pressure :   110 mmHg   Diastolic Blood Pressure :   78 mmHg   Mean Arterial Pressure :   89 mmHg   Peripheral Pulse Rate :   69 bpm   Respiratory Rate :   16 br/min   Oxygen Saturation :   97 %   Lynne Jansen - 8/12/2021 10:02 AM CDT   Health Status   Allergies Verified? :   Yes   Medication History Verified? :   Yes   Lynne Jansen - 8/12/2021 10:02 AM CDT   Consents   Consent for Immunization Exchange :   Consent Granted   Consent for Immunizations to Providers :   Consent Granted   Lynne Jansen - 8/12/2021 10:02 AM CDT   Meds / Allergies   (As Of: 8/12/2021 10:06:07 AM CDT)   Allergies (Active)   penicillins  Estimated Onset Date:   Unspecified ; Reactions:   Swelling ; Created By:   Milagros Abraham CMA; Reaction Status:   Active ; Category:   Drug ; Substance:   penicillins ; Type:   Allergy ; Updated By:   Milagros Abraham CMA; Reviewed Date:   3/24/2021 1:06 PM CDT        Medication List   (As Of: 8/12/2021 10:06:07 AM CDT)   Prescription/Discharge Order    albuterol  :   albuterol ; Status:   Prescribed ; Ordered As Mnemonic:   Albuterol (Eqv-Proventil HFA) 90 mcg/inh inhalation aerosol ; Simple Display Line:   See Instructions, INHALE TWO PUFFS BY MOUTH FOUR TIMES DAILY AS NEEDED FOR WHEEZING, 1 EA, 0 Refill(s) ; Ordering Provider:   Kaylan Neville MD; Catalog Code:   albuterol ; Order Dt/Tm:   8/5/2021 11:12:13 AM CDT          estradiol  :   estradiol ; Status:   Prescribed ; Ordered As Mnemonic:   estradiol valerate 40 mg/mL intramuscular solution ; Simple Display Line:   See Instructions, 0.1 mL IM every other week, 2 mL, 0 Refill(s) ; Ordering Provider:   Kaylan Neville MD; Catalog Code:   estradiol ; Order Dt/Tm:   8/5/2021 11:12:16  "AM CDT          formoterol-mometasone  :   formoterol-mometasone ; Status:   Prescribed ; Ordered As Mnemonic:   Dulera 100 mcg-5 mcg/inh inhalation aerosol ; Simple Display Line:   2 puff(s), Inhale, bid, 13 gm, 3 Refill(s) ; Ordering Provider:   Kaylan Neville MD; Catalog Code:   formoterol-mometasone ; Order Dt/Tm:   8/5/2021 11:13:05 AM CDT          Miscellaneous Prescription  :   Miscellaneous Prescription ; Status:   Prescribed ; Ordered As Mnemonic:   valved holding chamber spacer ; Simple Display Line:   See Instructions, use with albuterol, 1 EA, 0 Refill(s) ; Ordering Provider:   Kaylan Neville MD; Catalog Code:   Miscellaneous Prescription ; Order Dt/Tm:   1/26/2021 2:06:35 PM CST          Miscellaneous Rx Supply  :   Miscellaneous Rx Supply ; Status:   Prescribed ; Ordered As Mnemonic:   1 ml syringe with luer-omar ; Simple Display Line:   See Instructions, use as directed, 6 EA, 6 Refill(s) ; Ordering Provider:   Kaylan Neville MD; Catalog Code:   Miscellaneous Rx Supply ; Order Dt/Tm:   3/24/2021 1:28:39 PM CDT          Miscellaneous Rx Supply  :   Miscellaneous Rx Supply ; Status:   Prescribed ; Ordered As Mnemonic:   21gx1\" safety glide needle ; Simple Display Line:   See Instructions, use as directed, 10 EA, 10 Refill(s) ; Ordering Provider:   Kaylan Neville MD; Catalog Code:   Miscellaneous Rx Supply ; Order Dt/Tm:   3/24/2021 1:38:10 PM CDT  "

## 2022-02-16 NOTE — TELEPHONE ENCOUNTER
---------------------  From: Kaylan Neville MD   To: [facility] Message Pool ([id number]_[place] [facility] [place]);     Sent: 1/27/2021 10:13:37 AM CST  Subject: General Message     please let gil know that her estradiol level is still higher than it should be, lets recheck in 2 weeks, we want it less than 200 before she starts using her estradiol again.  please enter rtc lab only estradiol level for 2 weeks from now, thanksPt notified and states understanding. RTC ordered.

## 2022-02-16 NOTE — NURSING NOTE
Comprehensive Intake Entered On:  2/23/2021 1:07 PM CST    Performed On:  2/23/2021 1:03 PM CST by Lynne Jansen               Summary   Chief Complaint :   Follow up   Weight Measured :   152.4 lb(Converted to: 152 lb 6 oz, 69.127 kg)    Systolic Blood Pressure :   104 mmHg   Diastolic Blood Pressure :   80 mmHg   Mean Arterial Pressure :   88 mmHg   Peripheral Pulse Rate :   68 bpm   BP Site :   Right arm   BP Method :   Manual   Respiratory Rate :   16 br/min   Oxygen Saturation :   99 %   Lynne Jansen - 2/23/2021 1:03 PM CST   Health Status   Allergies Verified? :   Yes   Medication History Verified? :   Yes   Pre-Visit Planning Status :   Completed   Tobacco Use? :   Current every day smoker   Lynne Jansen - 2/23/2021 1:03 PM CST   Consents   Consent for Immunization Exchange :   Consent Granted   Consent for Immunizations to Providers :   Consent Granted   Lynne Jansen - 2/23/2021 1:03 PM CST   Meds / Allergies   (As Of: 2/23/2021 1:07:00 PM CST)   Allergies (Active)   penicillins  Estimated Onset Date:   Unspecified ; Reactions:   Swelling ; Created By:   Milagros Abraham CMA; Reaction Status:   Active ; Category:   Drug ; Substance:   penicillins ; Type:   Allergy ; Updated By:   Milagros Abraham CMA; Reviewed Date:   2/23/2021 1:06 PM CST        Medication List   (As Of: 2/23/2021 1:07:00 PM CST)   Prescription/Discharge Order    Miscellaneous Prescription  :   Miscellaneous Prescription ; Status:   Prescribed ; Ordered As Mnemonic:   valved holding chamber spacer ; Simple Display Line:   See Instructions, use with albuterol, 1 EA, 0 Refill(s) ; Ordering Provider:   Mars Neville MDica; Catalog Code:   Miscellaneous Prescription ; Order Dt/Tm:   1/26/2021 2:06:35 PM CST          estradiol  :   estradiol ; Status:   Prescribed ; Ordered As Mnemonic:   estradiol valerate 40 mg/mL intramuscular solution ; Simple Display Line:   See Instructions, 0.1 mL IM weekly, 5 mL, 0 Refill(s) ; Ordering Provider:    Kaylan Neville MD; Catalog Code:   estradiol ; Order Dt/Tm:   12/29/2020 1:34:34 PM CST          estradiol  :   estradiol ; Status:   Prescribed ; Ordered As Mnemonic:   estradiol valerate 40 mg/mL intramuscular solution ; Simple Display Line:   10 mg, 0.25 mL, IM, q2 wks, 5 mL, 0 Refill(s) ; Ordering Provider:   Kaylan Neville MD; Catalog Code:   estradiol ; Order Dt/Tm:   12/1/2020 2:02:08 PM CST          albuterol  :   albuterol ; Status:   Prescribed ; Ordered As Mnemonic:   Albuterol (Eqv-ProAir HFA) 90 mcg/inh inhalation aerosol ; Simple Display Line:   2 puff(s), Inhale, qid, PRN: for wheezing, 3 EA, 0 Refill(s) ; Ordering Provider:   Kaylan Neville MD; Catalog Code:   albuterol ; Order Dt/Tm:   1/27/2021 2:18:16 PM CST            Home Meds    progesterone  :   progesterone ; Status:   Documented ; Ordered As Mnemonic:   progesterone 50 mg/mL intramuscular solution ; Simple Display Line:   50 mg, 1 mL, IM, 2x/wk, 0 Refill(s) ; Catalog Code:   progesterone ; Order Dt/Tm:   12/1/2020 1:12:21 PM CST          estradiol  :   estradiol ; Status:   Documented ; Ordered As Mnemonic:   estradiol valerate 40 mg/mL intramuscular solution ; Simple Display Line:   40 mg, IM, 2x/wk, 0 Refill(s) ; Catalog Code:   estradiol ; Order Dt/Tm:   12/1/2020 1:10:46 PM CST            ID Risk Screen   Recent Travel History :   No recent travel   Family Member Travel History :   No recent travel   Other Exposure to Infectious Disease :   Unknown   COVID-19 Testing Status :   No COVID-19 test performed   Lynne Jansen 2/23/2021 1:03 PM CST
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
## 2022-02-16 NOTE — TELEPHONE ENCOUNTER
---------------------  From: Kaylan Neville MD   To: [employer] Message Pool ([id number] - [place]);     Sent: 1/5/2021 2:52:32 PM CST  Subject: General Message     please let patient know her estradiol level is up even higher than the last time we checked it.  Hold off on another injection until this gets back to normal levels.  We should recheck in 2 weeks.        Results:  Date Result Name Ind Value Ref Range   12/29/2020 2:13 PM Albumin Level  4.3 gm/dL (3.6 - 5.1)   12/29/2020 2:13 PM Estradiol Level ((H)) 1,106 pg/mL    12/29/2020 2:13 PM Prolactin Level  9.1 ng/mL    12/29/2020 2:13 PM Sex Hormone Binding Globulin (SHBG) ((H)) 141 nmol/L (17 - 124)   12/29/2020 2:13 PM Testosterone Total  11 ng/dL (2 - 45)   12/29/2020 2:13 PM Testosterone Bioavail  0.7 ng/dL (0.5 - 8.5)   12/29/2020 2:13 PM Testosterone Free  0.4 pg/mL (0.2 - 5.0)LMTCB @ 305pm---------------------  From: Milagros Abraham CMA ([employer] Message Pool ([id number] - [place]))   To: Phone Messages Pool ([id number] - [place]);     Sent: 1/5/2021 3:05:31 PM CST  Subject: FW: General MessagePt notified @ 309pm  ** Submitted: **  Order:Return to Clinic (Request)  Details:  RFV: Lab appt: Repeat estrogen level, Return in 2 wks         Signed by Milagros Abraham CMA  1/5/2021 9:10:00 PM [facility]
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
## 2022-02-16 NOTE — TELEPHONE ENCOUNTER
---------------------  From: Ekaterina Orlando   To: Kaylan Neville MD;     Sent: 2/12/2021 2:54:09 PM CST  Subject: Scheduling Management     PT NO SHOW FOR FOLLOW UP LABS 2.12.21---------------------  From: Kaylan Neville MD   To: [employer] Message Pool ([id number] - [place]);     Sent: 2/17/2021 8:12:43 AM CST  Subject: FW: Scheduling Management     can you please check with pt to see if she meant this to be a telemed appt?  It is unusual for her to miss appts...Patient rescheduled for 2/23/21
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
## 2022-02-16 NOTE — TELEPHONE ENCOUNTER
---------------------  From: Kaylan Neville MD   To: [facility] Message Pool ([id number]_[facility]); Phone Messages Pool ([id number] [facility] [place]);     Sent: 2/13/2021 10:39:21 PM CST  Subject: General Message     pls check with pt to see if her appt was supposed to be telemed on Friday, thanks---------------------  From: Milagros Abraham CMA ([facility] Message Pool ([id number]_[facility]))   To: Appointment Pool ([id number]);     Sent: 2/16/2021 9:28:49 AM CST  Subject: FW: General Message---------------------  From: Ana Ibrahim (Appointment Pool ([id number]))   To: [facility] Message Pool ([id number]_[place] [facility] [place]);     Sent: 2/16/2021 9:37:56 AM CST  Subject: RE: General Message     Patient rescheduled in clinic 2.23.21 with [facility] following labs on the 17th
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
## 2022-02-16 NOTE — NURSING NOTE
Comprehensive Intake Entered On:  12/1/2020 1:17 PM CST    Performed On:  12/1/2020 1:12 PM CST by Milagros Abraham CMA               Summary   Chief Complaint :   Establish care-discuss hormonal replacement.   Weight Measured :   148 lb(Converted to: 148 lb 0 oz, 67.132 kg)    Height Measured :   67.5 in(Converted to: 5 ft 7 in, 171.45 cm)    Body Mass Index :   22.84 kg/m2   Body Surface Area :   1.79 m2   Systolic Blood Pressure :   126 mmHg   Diastolic Blood Pressure :   70 mmHg   Mean Arterial Pressure :   89 mmHg   Peripheral Pulse Rate :   100 bpm   BP Site :   Right arm   BP Method :   Manual   Temperature Tympanic :   98.1 DegF(Converted to: 36.7 DegC)    Oxygen Saturation :   97 %   Milagros Abraham CMA - 12/1/2020 1:12 PM CST   Health Status   Allergies Verified? :   Yes   Medication History Verified? :   Yes   Pre-Visit Planning Status :   Not completed   Milagros Abraham CMA - 12/1/2020 1:12 PM CST   Meds / Allergies   (As Of: 12/1/2020 1:17:05 PM CST)   Allergies (Active)   penicillins  Estimated Onset Date:   Unspecified ; Reactions:   Swelling ; Created By:   Milagros Abraham CMA; Reaction Status:   Active ; Category:   Drug ; Substance:   penicillins ; Type:   Allergy ; Updated By:   Milagros Abraham CMA; Reviewed Date:   12/1/2020 1:16 PM CST        Medication List   (As Of: 12/1/2020 1:17:05 PM CST)   Home Meds    estradiol  :   estradiol ; Status:   Documented ; Ordered As Mnemonic:   estradiol valerate 40 mg/mL intramuscular solution ; Simple Display Line:   40 mg, IM, 2x/wk, 0 Refill(s) ; Catalog Code:   estradiol ; Order Dt/Tm:   12/1/2020 1:10:46 PM CST          progesterone  :   progesterone ; Status:   Documented ; Ordered As Mnemonic:   progesterone 50 mg/mL intramuscular solution ; Simple Display Line:   50 mg, 1 mL, IM, 2x/wk, 0 Refill(s) ; Catalog Code:   progesterone ; Order Dt/Tm:   12/1/2020 1:12:21 PM CST            ID Risk Screen   Recent Travel History :   No recent travel   Family Member Travel  History :   No recent travel   Other Exposure to Infectious Disease :   Unknown   Milagros Abraham CMA - 12/1/2020 1:12 PM CST   Social History   Social History   (As Of: 12/1/2020 1:17:05 PM CST)   Tobacco:  Current      10 or more cigarettes (1/2 pack or more)/day in last 30 days   (Last Updated: 12/1/2020 1:15:52 PM CST by Milagros Abraham CMA)          Electronic Cigarette/Vaping:  Denies Electronic Cigarette Use      Electronic Cigarette Use: Never.   (Last Updated: 12/1/2020 1:15:58 PM CST by Milagros Abraham CMA)
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
## 2022-02-16 NOTE — TELEPHONE ENCOUNTER
---------------------  From: Kaylan Neville MD   To: [facility] Message Pool ([id number] - [place]);     Sent: 11/17/2020 12:11:20 PM CST  Subject: General Message     can you please see if you could get the 2 clinics pt signed a release for to send us her records?  we should have them for todays appointment, thanks---------------------  From: Kerri Serrato CMA ([employer] Message Pool ([id number]_[facility]))   To: Shantelle Dinh;     Sent: 11/17/2020 12:14:52 PM CST  Subject: FW: General MessagePer EFRAIN, no records received. [facility] will call now.Informed patient of this. He chose to cancel todays appt. When records are received, CSS please call patient to schedule appt w/ MJPReceived one [facility] box. Waiting on other reports per [facility].
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
## 2022-02-16 NOTE — LETTER
(Inserted Image. Unable to display)   December 17, 2021  ELTON FARRELL   [address] [place] [address]        Dear ELTON,    Thank you for selecting [facility] for your healthcare needs.    Our records indicate you are due for the following services:     Non-Fasting Labs    If you had your labs done at another facility or with Direct Access Lab Testing at [facility], please bring in a copy of the results to your next visit, mail a copy, or drop off a copy of your results to your Healthcare Provider.     (FYI   Regarding office visits: In some instances, a video visit or telephone visit may be offered as an option.)    To schedule an appointment or if you have further questions, please contact your clinic at [phone].    Powered by [employer]    Sincerely,    Kaylan Neville MD

## 2022-02-16 NOTE — TELEPHONE ENCOUNTER
---------------------  From: Lola Zurita CMA   Sent: 12/11/2020 4:48:36 PM CST  Subject: General Message-[employer] fax     pt stopped in asking if MJP rec'd fax from [employer]    informed nothing rec'd at this time.  She was going to get in touch with [employer] on Monday

## 2022-02-16 NOTE — TELEPHONE ENCOUNTER
---------------------  From: Chanda Pichardo   To: [facility] Message Pool ([id number]_[place] - [place]);     Sent: 9/10/2021 3:57:33 PM CDT  Subject: Scheduling Management     Pt no showed for appt with [facility] 9.10.21 / Reason: Follow- up
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
## 2022-02-16 NOTE — LETTER
(Inserted Image. Unable to display)       October 01, 2021        ELTON BUD   [address] [place] [address]        Dear ELTON,      Thank you for selecting [facility] for your healthcare needs.      Benoit Alvarez,    This letter is to invite you to schedule a follow up appointment for gender dysphoria.  We are overdue for labs.  You also need to see Urology for priapism.  I know you [place] out of state; so if you can't make it in to see me, please schedule a follow up appointment with a provider that can do hormone therapy for your gender dysphoria, and with a Urologist for priapism.  I hope you will me able to see me soon for follow up.          Please contact my practice at [phone] if you have any questions or concerns.     Sincerely,        Kaylan Neville MD

## 2022-02-16 NOTE — NURSING NOTE
Comprehensive Intake Entered On:  1/12/2021 1:15 PM CST    Performed On:  1/12/2021 1:12 PM CST by Lynne Jansen               Summary   Chief Complaint :   Follow up   Weight Measured :   155.4 lb(Converted to: 155 lb 6 oz, 70.488 kg)    Height Measured :   67.5 in(Converted to: 5 ft 7 in, 171.45 cm)    Body Mass Index :   23.98 kg/m2   Body Surface Area :   1.83 m2   Systolic Blood Pressure :   104 mmHg   Diastolic Blood Pressure :   64 mmHg   Mean Arterial Pressure :   77 mmHg   Peripheral Pulse Rate :   85 bpm   Oxygen Saturation :   99 %   Lynne Jansen - 1/12/2021 1:12 PM CST   Health Status   Allergies Verified? :   Yes   Medication History Verified? :   Yes   Tobacco Use? :   Current every day smoker   Lynne Jansen - 1/12/2021 1:12 PM CST   Consents   Consent for Immunization Exchange :   Consent Granted   Consent for Immunizations to Providers :   Consent Granted   Lynne Jansen - 1/12/2021 1:12 PM CST   Meds / Allergies   (As Of: 1/12/2021 1:15:56 PM CST)   Allergies (Active)   penicillins  Estimated Onset Date:   Unspecified ; Reactions:   Swelling ; Created By:   Milagros Abraham CMA; Reaction Status:   Active ; Category:   Drug ; Substance:   penicillins ; Type:   Allergy ; Updated By:   Milagros Abraham CMA; Reviewed Date:   12/29/2020 1:05 PM CST        Medication List   (As Of: 1/12/2021 1:15:56 PM CST)   Prescription/Discharge Order    estradiol  :   estradiol ; Status:   Prescribed ; Ordered As Mnemonic:   estradiol valerate 40 mg/mL intramuscular solution ; Simple Display Line:   See Instructions, 0.1 mL IM weekly, 5 mL, 0 Refill(s) ; Ordering Provider:   Kaylan Neville MD; Catalog Code:   estradiol ; Order Dt/Tm:   12/29/2020 1:34:34 PM CST          estradiol  :   estradiol ; Status:   Prescribed ; Ordered As Mnemonic:   estradiol valerate 40 mg/mL intramuscular solution ; Simple Display Line:   10 mg, 0.25 mL, IM, q2 wks, 5 mL, 0 Refill(s) ; Ordering Provider:   Kaylan Neville MD;  Catalog Code:   estradiol ; Order Dt/Tm:   12/1/2020 2:02:08 PM CST            Home Meds    estradiol  :   estradiol ; Status:   Documented ; Ordered As Mnemonic:   estradiol valerate 40 mg/mL intramuscular solution ; Simple Display Line:   40 mg, IM, 2x/wk, 0 Refill(s) ; Catalog Code:   estradiol ; Order Dt/Tm:   12/1/2020 1:10:46 PM CST          progesterone  :   progesterone ; Status:   Documented ; Ordered As Mnemonic:   progesterone 50 mg/mL intramuscular solution ; Simple Display Line:   50 mg, 1 mL, IM, 2x/wk, 0 Refill(s) ; Catalog Code:   progesterone ; Order Dt/Tm:   12/1/2020 1:12:21 PM CST            ID Risk Screen   Recent Travel History :   No recent travel   Family Member Travel History :   No recent travel   Other Exposure to Infectious Disease :   Unknown   Lynne Jansen - 1/12/2021 1:12 PM CST

## 2022-02-16 NOTE — NURSING NOTE
Comprehensive Intake Entered On:  1/26/2021 1:11 PM CST    Performed On:  1/26/2021 1:08 PM CST by Lynne Jansen               Summary   Chief Complaint :   2 week f/u   Weight Measured :   144.6 lb(Converted to: 144 lb 10 oz, 65.589 kg)    Systolic Blood Pressure :   110 mmHg   Diastolic Blood Pressure :   60 mmHg   Mean Arterial Pressure :   77 mmHg   Peripheral Pulse Rate :   86 bpm   Oxygen Saturation :   98 %   Lynne Jansen - 1/26/2021 1:08 PM CST   Health Status   Allergies Verified? :   Yes   Medication History Verified? :   Yes   Tobacco Use? :   Never smoker   yLnne Jansen - 1/26/2021 1:08 PM CST   Consents   Consent for Immunization Exchange :   Consent Granted   Consent for Immunizations to Providers :   Consent Granted   Lynne Jansen - 1/26/2021 1:08 PM CST   Meds / Allergies   (As Of: 1/26/2021 1:11:11 PM CST)   Allergies (Active)   penicillins  Estimated Onset Date:   Unspecified ; Reactions:   Swelling ; Created By:   Milagros Abraham CMA; Reaction Status:   Active ; Category:   Drug ; Substance:   penicillins ; Type:   Allergy ; Updated By:   Milagros Abraham CMA; Reviewed Date:   12/29/2020 1:05 PM CST        Medication List   (As Of: 1/26/2021 1:11:11 PM CST)   Prescription/Discharge Order    estradiol  :   estradiol ; Status:   Prescribed ; Ordered As Mnemonic:   estradiol valerate 40 mg/mL intramuscular solution ; Simple Display Line:   See Instructions, 0.1 mL IM weekly, 5 mL, 0 Refill(s) ; Ordering Provider:   Kaylan Neville MD; Catalog Code:   estradiol ; Order Dt/Tm:   12/29/2020 1:34:34 PM CST          estradiol  :   estradiol ; Status:   Prescribed ; Ordered As Mnemonic:   estradiol valerate 40 mg/mL intramuscular solution ; Simple Display Line:   10 mg, 0.25 mL, IM, q2 wks, 5 mL, 0 Refill(s) ; Ordering Provider:   Kaylan Neville MD; Catalog Code:   estradiol ; Order Dt/Tm:   12/1/2020 2:02:08 PM CST            Home Meds    estradiol  :   estradiol ; Status:   Documented ;  Ordered As Mnemonic:   estradiol valerate 40 mg/mL intramuscular solution ; Simple Display Line:   40 mg, IM, 2x/wk, 0 Refill(s) ; Catalog Code:   estradiol ; Order Dt/Tm:   12/1/2020 1:10:46 PM CST          progesterone  :   progesterone ; Status:   Documented ; Ordered As Mnemonic:   progesterone 50 mg/mL intramuscular solution ; Simple Display Line:   50 mg, 1 mL, IM, 2x/wk, 0 Refill(s) ; Catalog Code:   progesterone ; Order Dt/Tm:   12/1/2020 1:12:21 PM CST            ID Risk Screen   Recent Travel History :   No recent travel   Family Member Travel History :   No recent travel   Other Exposure to Infectious Disease :   Unknown   COVID-19 Testing Status :   No positive COVID-19 test   Lynne Jansen - 1/26/2021 1:08 PM CST

## 2022-02-16 NOTE — NURSING NOTE
Comprehensive Intake Entered On:  5/21/2021 2:13 PM CDT    Performed On:  5/21/2021 2:11 PM CDT by Lynne Jansen               Summary   Chief Complaint :   F/u labs   Weight Measured :   150.4 lb(Converted to: 150 lb 6 oz, 68.220 kg)    Systolic Blood Pressure :   98 mmHg   Diastolic Blood Pressure :   60 mmHg   Mean Arterial Pressure :   73 mmHg   Peripheral Pulse Rate :   97 bpm   BP Site :   Right arm   BP Method :   Manual   Respiratory Rate :   16 br/min   Oxygen Saturation :   97 %   Lynne Jansen - 5/21/2021 2:11 PM CDT   Health Status   Allergies Verified? :   Yes   Medication History Verified? :   Yes   Tobacco Use? :   Current every day smoker   Lynne Jansen - 5/21/2021 2:11 PM CDT   Consents   Consent for Immunization Exchange :   Consent Granted   Consent for Immunizations to Providers :   Consent Granted   Lynne Jansen - 5/21/2021 2:11 PM CDT   Meds / Allergies   (As Of: 5/21/2021 2:13:46 PM CDT)   Allergies (Active)   penicillins  Estimated Onset Date:   Unspecified ; Reactions:   Swelling ; Created By:   Milagros Abraham CMA; Reaction Status:   Active ; Category:   Drug ; Substance:   penicillins ; Type:   Allergy ; Updated By:   Milagros Abraham CMA; Reviewed Date:   3/24/2021 1:06 PM CDT        Medication List   (As Of: 5/21/2021 2:13:47 PM CDT)   Prescription/Discharge Order    albuterol  :   albuterol ; Status:   Prescribed ; Ordered As Mnemonic:   Albuterol (Eqv-ProAir HFA) 90 mcg/inh inhalation aerosol ; Simple Display Line:   2 puff(s), Inhale, qid, PRN: for wheezing, 1 EA, 3 Refill(s) ; Ordering Provider:   Kaylan Neville MD; Catalog Code:   albuterol ; Order Dt/Tm:   2/23/2021 1:26:52 PM CST          estradiol  :   estradiol ; Status:   Prescribed ; Ordered As Mnemonic:   estradiol valerate 40 mg/mL intramuscular solution ; Simple Display Line:   See Instructions, 0.1 mL IM every other week, 5 mL, 2 Refill(s) ; Ordering Provider:   Kaylan Neville MD; Catalog Code:   estradiol ;  "Order Dt/Tm:   4/20/2021 2:20:35 PM CDT          fluticasone  :   fluticasone ; Status:   Prescribed ; Ordered As Mnemonic:   Flovent  mcg/inh inhalation aerosol ; Simple Display Line:   2 puff(s), Inhale, bid, 1 EA, 3 Refill(s) ; Ordering Provider:   Kaylan Neville MD; Catalog Code:   fluticasone ; Order Dt/Tm:   2/23/2021 1:23:58 PM CST          Miscellaneous Prescription  :   Miscellaneous Prescription ; Status:   Prescribed ; Ordered As Mnemonic:   valved holding chamber spacer ; Simple Display Line:   See Instructions, use with albuterol, 1 EA, 0 Refill(s) ; Ordering Provider:   Kaylan Neville MD; Catalog Code:   Miscellaneous Prescription ; Order Dt/Tm:   1/26/2021 2:06:35 PM CST          Miscellaneous Rx Supply  :   Miscellaneous Rx Supply ; Status:   Prescribed ; Ordered As Mnemonic:   1 ml syringe with luer-omar ; Simple Display Line:   See Instructions, use as directed, 6 EA, 6 Refill(s) ; Ordering Provider:   Kaylan Neville MD; Catalog Code:   Miscellaneous Rx Supply ; Order Dt/Tm:   3/24/2021 1:28:39 PM CDT          Miscellaneous Rx Supply  :   Miscellaneous Rx Supply ; Status:   Prescribed ; Ordered As Mnemonic:   21gx1\" safety glide needle ; Simple Display Line:   See Instructions, use as directed, 10 EA, 10 Refill(s) ; Ordering Provider:   Kaylan Neville MD; Catalog Code:   Miscellaneous Rx Supply ; Order Dt/Tm:   3/24/2021 1:38:10 PM CDT            ID Risk Screen   Recent Travel History :   No recent travel   Family Member Travel History :   No recent travel   Other Exposure to Infectious Disease :   Unknown   COVID-19 Testing Status :   No COVID-19 test performed   Lynne Jansen - 5/21/2021 2:11 PM CDT  "
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
## 2022-02-16 NOTE — TELEPHONE ENCOUNTER
---------------------  From: Kaylan Neville MD   To: [facility] Message Pool ([id number]_[place]);     Sent: 11/30/2021 1:32:38 PM CST  Subject: General Message     pls let pt know her estradiol was a little low at 60, I'll increase her estrogen to 5 mg daily        Results:  Date Result Name Value   11/26/2021 1:48 PM Estradiol Level 60 pg/mLCalled a number of times. Message states cannot complete call at this time, please try again later.If patient calls back please relay message.  
Event Note

## 2022-02-16 NOTE — TELEPHONE ENCOUNTER
---------------------  From: Milagros Abraham CMA ([facility] Message Pool ([id number]_[facility]))   To: Kaylan Neville MD;     Sent: 12/1/2020 10:56:17 AM CST  Subject: FW: General Message           ---------------------  From: Shantelle Dinh   To: [facility] Message Pool ([id number]_[place]);     Sent: 11/24/2020 10:33:14 AM CST  Subject: General Message     Mental health note is scanned in, It is the sensitive note from [facility] and is the only note from this doctor and facility. I received Dr. Smyth records today and will send to [place] to scan and send out. Thanks

## 2022-02-16 NOTE — LETTER
(Inserted Image. Unable to display)            April 15, 2021        ELTON FARRELL  [address] [address]        Dear ELTON,     Thank you for selecting [facility] for your healthcare needs. Below you will find the results of your recent test(s) done at our clinic.      This is a copy for your record.  Let's discuss at your next visit.      Result Name Current Result Previous Result   Estradiol Level (pg/mL)  171 4/14/2021 ((H)) 616 3/16/2021     Please contact my practice at [phone] if you have any questions or concerns.     Sincerely,        Kaylan Neville MD      What do your labs mean?  Below is a glossary of commonly ordered labs:  LDL   Bad Cholesterol   HDL   Good Cholesterol  AST/ALT   Liver Function   Cr/Creatinine   Kidney Function  Microalbumin   Kidney Function  BUN   Kidney Function  PSA   Prostate    TSH   Thyroid Hormone  HgbA1c   Diabetes Test   Hgb (Hemoglobin)   Red Blood Cells

## 2022-02-16 NOTE — TELEPHONE ENCOUNTER
Entered by Milagros Abraham CMA on May 25, 2021 10:41:27 AM CDT  LR on 2/23/21 for # 1 and 3 refills. Last saw [facility] on 5/21 for f/u labs. No updated RTC placed so not sure when pt is next due? Please advise next due visit and when she should return.       ------------------------------------------  From: [facility] #[id number]  To: Kaylan Neville MD  Sent: May 22, 2021 12:07:12 PM CDT  Subject: Medication Management  Due: May 14, 2021 8:13:56 PM CDT     ** On Hold Pending Signature **     Dispensed Drug: albuterol (Albuterol (Eqv-Proventil HFA) 90 mcg/inh inhalation aerosol), INHALE TWO PUFFS BY MOUTH FOUR TIMES DAILY AS NEEDED FOR WHEEZING  Quantity: 6.7 gm  Days Supply: 25  Refills: 0  Substitutions Allowed  Notes from Pharmacy:  ---------------------------------------------------------------  From: Milagros Abraham CMA ([facility] Pool ([id number]_[facility]))   To: [facility] Message Pool ([id number]_[place]);     Sent: 5/25/2021 10:41:34 AM CDT  Subject: FW: Medication Management   Due Date/Time: 5/23/2021 12:07:00 PM CDT---------------------  From: Lynne Jnasen ([employer] Message Pool ([id number]_[facility]))   To: Kaylan Neville MD;     Sent: 5/25/2021 1:20:24 PM CDT  Subject: FW: Medication Management   Due Date/Time: 5/23/2021 12:07:00 PM CDT---------------------  From: Kaylan Neville MD   To: [facility] #[id number]    Sent: 5/26/2021 3:58:54 PM CDT  Subject: FW: Medication Management     ** Submitted: **  Complete:albuterol (Albuterol (Eqv-ProAir HFA) 90 mcg/inh inhalation aerosol)   Signed by Kaylan Neville MD  5/26/2021 8:58:00 PM [facility]    ** Approved with modifications: **  albuterol (ALBUTEROL HFA INH (200 PUFFS)6.7GM)  INHALE TWO PUFFS BY MOUTH FOUR TIMES DAILY AS NEEDED FOR WHEEZING  Qty:  6.7 gm        Days Supply:  25        Refills:  0          Substitutions Allowed     Route To Pharmacy - [facility] #[id number]

## 2022-02-16 NOTE — TELEPHONE ENCOUNTER
Entered by Parul Ding RN on August 06, 2021 3:23:14 PM CDT  ---------------------  From: Parul Ding RN   To: [facility] #[id number]    Sent: 8/6/2021 3:23:14 PM CDT  Subject: Medication Management     ** Not Approved: Filled 8/5/21 **  albuterol (ALBUTEROL HFA INH (200 PUFFS)8.5GM)  INHALE 2 PUFFS BY MOUTH FOUR TIMES DAILY AS NEEDED FOR WHEEZING  Qty:  25.5 gm        Days Supply:  75        Refills:  0          Substitutions Allowed     Route To Pharmacy - [facility] #[id number]   Note from Pharmacy:  **Patient requests 90 days supply**  Signed by Parul Ding RN            ------------------------------------------  From: [facility] #[id number]  To: Kaylan Neville MD  Sent: August 5, 2021 11:46:19 AM CDT  Subject: Medication Management  Due: August 3, 2021 11:17:00 AM CDT     ** On Hold Pending Signature **     Dispensed Drug: albuterol (Albuterol (Eqv-ProAir HFA) 90 mcg/inh inhalation aerosol), INHALE 2 PUFFS BY MOUTH FOUR TIMES DAILY AS NEEDED FOR WHEEZING  Quantity: 25.5 gm  Days Supply: 75  Refills: 0  Substitutions Allowed  Notes from Pharmacy: **Patient requests 90 days supply**  ------------------------------------------

## 2022-02-16 NOTE — TELEPHONE ENCOUNTER
---------------------  From: Kaylan Neville MD   To: [facility] Message Pool ([id number]_[place]);     Sent: 8/8/2021 8:17:54 PM CDT  Subject: General Message     please let pt know I heard back from [facility] and was told they have only 1 letter from their mental health providers, recommend they as mental health providers to resend letters.

## 2022-02-16 NOTE — NURSING NOTE
Comprehensive Intake Entered On:  11/26/2021 1:03 PM CST    Performed On:  11/26/2021 12:57 PM CST by Lynne Jansen               Summary   Chief Complaint :   Follow up   Weight Measured :   138.5 lb(Converted to: 138 lb 8 oz, 62.823 kg)    Systolic Blood Pressure :   100 mmHg   Diastolic Blood Pressure :   60 mmHg   Mean Arterial Pressure :   73 mmHg   Peripheral Pulse Rate :   81 bpm   BP Site :   Right arm   BP Method :   Manual   Respiratory Rate :   16 br/min   Oxygen Saturation :   98 %   Lynne Jansen - 11/26/2021 12:57 PM CST   Health Status   Allergies Verified? :   Yes   Medication History Verified? :   Yes   Lynne Jansen - 11/26/2021 12:57 PM CST   Consents   Consent for Immunization Exchange :   Consent Granted   Consent for Immunizations to Providers :   Consent Granted   Lynne Jansen - 11/26/2021 12:57 PM CST   Meds / Allergies   (As Of: 11/26/2021 1:03:25 PM CST)   Allergies (Active)   penicillins  Estimated Onset Date:   Unspecified ; Reactions:   Swelling ; Created By:   Milagros Abraham CMA; Reaction Status:   Active ; Category:   Drug ; Substance:   penicillins ; Type:   Allergy ; Updated By:   Milagros Abraham CMA; Reviewed Date:   11/18/2021 1:20 PM CST        Medication List   (As Of: 11/26/2021 1:03:25 PM CST)   Prescription/Discharge Order    albuterol  :   albuterol ; Status:   Prescribed ; Ordered As Mnemonic:   Albuterol (Eqv-Proventil HFA) 90 mcg/inh inhalation aerosol ; Simple Display Line:   See Instructions, INHALE TWO PUFFS BY MOUTH FOUR TIMES DAILY AS NEEDED FOR WHEEZING, 1 EA, 0 Refill(s) ; Ordering Provider:   Kaylan Neville MD; Catalog Code:   albuterol ; Order Dt/Tm:   8/5/2021 11:12:13 AM CDT          estradiol  :   estradiol ; Status:   Prescribed ; Ordered As Mnemonic:   estradiol 2 mg oral tablet ; Simple Display Line:   4 mg, 2 tab(s), Oral, daily, 60 tab(s), 0 Refill(s) ; Ordering Provider:   Kaylan Neville MD; Catalog Code:   estradiol ; Order Dt/Tm:    11/18/2021 1:41:59 PM CST          formoterol-mometasone  :   formoterol-mometasone ; Status:   Prescribed ; Ordered As Mnemonic:   Dulera 100 mcg-5 mcg/inh inhalation aerosol ; Simple Display Line:   2 puff(s), Inhale, bid, 3 EA, 3 Refill(s) ; Ordering Provider:   Kaylan Neville MD; Catalog Code:   formoterol-mometasone ; Order Dt/Tm:   11/18/2021 1:49:06 PM CST          QUEtiapine  :   QUEtiapine ; Status:   Prescribed ; Ordered As Mnemonic:   QUEtiapine 25 mg oral tablet ; Simple Display Line:   25 mg, 1 tab(s), Oral, bid, 60 tab(s), 1 Refill(s) ; Ordering Provider:   Kaylan Neville MD; Catalog Code:   QUEtiapine ; Order Dt/Tm:   11/18/2021 2:19:22 PM CST

## 2022-02-16 NOTE — LETTER
(Inserted Image. Unable to display)         January 13, 2021        ELTON FARRELL   STATE [address] [id number]        Dear ELTON,     Thank you for selecting [facility] for your healthcare needs. Below you will find the results of your recent test(s) done at our clinic.      Please let patient know we should continue to hold the estrogen and repeat next wee, please add rtc order for lab only estradiol in 1 week, thanks      Result Name Current Result Previous Result   Estradiol Level (pg/mL) ((H)) 545 1/12/2021 ((H)) 1,106 12/29/2020     Please contact my practice at [phone] if you have any questions or concerns.     Sincerely,        Kaylan Neville MD      What do your labs mean?  Below is a glossary of commonly ordered labs:  LDL   Bad Cholesterol   HDL   Good Cholesterol  AST/ALT   Liver Function   Cr/Creatinine   Kidney Function  Microalbumin   Kidney Function  BUN   Kidney Function  PSA   Prostate    TSH   Thyroid Hormone  HgbA1c   Diabetes Test   Hgb (Hemoglobin)   Red Blood Cells

## 2022-02-16 NOTE — LETTER
(Inserted Image. Unable to display)                                                                                                                                                        August 12, 2021Re: ELTON [id number]  [facility]   [address] [place] [address]Zg:   [facility] The following patient has been referred to your office/practice:  ELTON FARRELL Appointment:  Appointment is PendingPlease refer to the attached  clinical documentation for a summary of ELTON's care.  Please do not hesitate to contact our office if any additional clinical questions arise.  All relevant records and transition of care documents should be mailed or faxed.Your assistance in providing continuity of care is appreciated. Sincerely, [address] [address](P) [phone](F) [phone]
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
## 2022-02-16 NOTE — PROGRESS NOTES
Patient Information     Name:ELTON FARRELL      Address:      [address] [place] [id number]     Sex:Female     YOB: 1975     [phone]     Emergency Contact:VASILE GREGORY     MRN:[id number]     FIN:[id number]     Location:[facility]     Date of Service:01/12/2021      Primary Care Physician:       NONE ,       Attending Physician:       Mars Neville MDica, [phone]  Subjective      patient present to clinic today for follow up of her gender dysphoria.  She is here today with her partner.  At our last visit we learned that she had been taking her estradiol injections every 1 week instead of every 2 weeks.  Her estradiol level was quite elevated.  We have been holding her estradiol for the past 2 weeks so that we can recheck it.  We will resume her estradiol when she reaches normal physiological ranges for a cyst female preferably less than 200.  We also discussed my concerns about her partner having AN estradiol level that is quite high despite never having been prescribed any estradiol previously.  Patient reports that after she started getting her Depo estradiol she no longer needed oral estrogen but was able to continue to  the oral estrogen prescriptions.  Explained that I am here to support both of them but that I would like to know more about who is getting which medications so that I can make educated decisions about helping them with her medical care.  I appreciate her honesty because without it I would be afraid that providing prescriptions of estrogen for her or her partner may cause harm.  She also continues to work with her counselor and would like to pursue changing her gender illegally.  She reports that even with the elevated estrogen levels she is tolerated this without feeling any adverse side effects.  Explained that the elevated estrogen levels are probably increasing her risk of coagulopathy and are probably not  accelerating the development of breast so would like to keep her at safe levels.  She is in agreement.  She also continues to work with her mental health provider.      Review of systems is negative except as per HPI including:  no fevers, chills, sore throat, runny nose, nausea, vomiting, constipation, diarrhea, rash or new skin lesions, chest pain, palpitations, slurred speech, new paresthesia, shortness of breath or wheeze.      Exam:      General: alert and oriented ×3 no acute distress.      HEENT: pupils are equal round and reactive to light extraocular motion is intact. Normocephalic and atraumatic.       Hearing is grossly normal and there is no otorrhea.       Nares are patent there is no rhinorrhea.       Mucous membranes are moist and pink.      Chest: has bilateral rise with no increased work of breathing.      Cardiovascular: normal perfusion and brisk capillary refill.      Musculoskeletal: no gross focal abnormalities and normal gait.      Neuro: no gross focal abnormalities and memory seems intact.      Psychiatric: speech is clear and coherent and fluent. Patient dressed appropriately for the weather. Mood is appropriate and affect is full.                     Discussed with patient to return to clinic if symptoms worsen or do not improve  Objective   Vitals & Measurements    HR: 85 (Peripheral)  BP: 104/64  SpO2: 99%  WT: 155.4 lb    Lab Results        Lab Results (Last 4 results within 90 days)         Sodium Level: 137 mmol/L [135 mmol/L - 146 mmol/L] (12/22/20 14:46:00)        Potassium Level: 4.1 mmol/L [3.5 mmol/L - 5.3 mmol/L] (12/22/20 14:46:00)        Chloride Level: 100 mmol/L [98 mmol/L - 110 mmol/L] (12/22/20 14:46:00)        CO2 Level: 30 mmol/L [20 mmol/L - 32 mmol/L] (12/22/20 14:46:00)        Glucose Level: 98 mg/dL [65 mg/dL - 99 mg/dL] (12/22/20 14:46:00)        BUN: 7 mg/dL [7 mg/dL - 25 mg/dL] (12/22/20 14:46:00)        Creatinine Level: 0.71 mg/dL [0.5 mg/dL - 1.1 mg/dL]  (12/22/20 14:46:00)        BUN/Creat Ratio: NOT APPLICABLE [6  - 22] (12/22/20 14:46:00)        eGFR: 101 mL/min/1.73m2 (12/22/20 14:46:00)        eGFR African American: 118 mL/min/1.73m2 (12/22/20 14:46:00)        Calcium Level: 9.5 mg/dL [8.6 mg/dL - 10.2 mg/dL] (12/22/20 14:46:00)        Bilirubin Total: 0.4 mg/dL [0.2 mg/dL - 1.2 mg/dL] (12/22/20 14:46:00)        Alkaline Phosphatase: 56 unit/L [31 unit/L - 125 unit/L] (12/22/20 14:46:00)        AST/SGOT: 15 unit/L [10 unit/L - 35 unit/L] (12/22/20 14:46:00)        ALT/SGPT: 9 unit/L [6 unit/L - 29 unit/L] (12/22/20 14:46:00)        Protein Total: 6.6 gm/dL [6.1 gm/dL - 8.1 gm/dL] (12/22/20 14:46:00)        Albumin Level: 4.3 gm/dL [3.6 gm/dL - 5.1 gm/dL] (12/29/20 14:13:00)        Albumin Level: 4.3 gm/dL [3.6 gm/dL - 5.1 gm/dL] (12/22/20 14:46:00)        Globulin: 2.3 [1.9  - 3.7] (12/22/20 14:46:00)        A/G Ratio: 1.9 [1  - 2.5] (12/22/20 14:46:00)        Cholesterol: 148 mg/dL (12/22/20 14:46:00)        Non-HDL Cholesterol: 105 (12/22/20 14:46:00)        HDL: 43 mg/dL Low (12/22/20 14:46:00)        Cholesterol/HDL Ratio: 3.4 (12/22/20 14:46:00)        LDL: 85 (12/22/20 14:46:00)        Triglyceride: 103 mg/dL (12/22/20 14:46:00)        Estradiol Level: 545 pg/mL High (01/12/21 13:49:00)        Estradiol Level: 1106 pg/mL High (12/29/20 14:13:00)        Estradiol Level: 863 pg/mL High (12/22/20 14:46:00)        Prolactin Level: 9.1 ng/mL (12/29/20 14:13:00)        Sex Hormone Binding Globulin (SHBG): 141 nmol/L High [17 nmol/L - 124 nmol/L] (12/29/20 14:13:00)        Testosterone Total: 11 ng/dL [2 ng/dL - 45 ng/dL] (12/29/20 14:13:00)        Testosterone Total: 12 ng/dL [2 ng/dL - 45 ng/dL] (12/22/20 14:46:00)        Testosterone Bioavail: 0.7 ng/dL [0.5 ng/dL - 8.5 ng/dL] (12/29/20 14:13:00)        Testosterone Free: 0.4 pg/mL [0.2 pg/mL - 5 pg/mL] (12/29/20 14:13:00)        Testosterone Free: 0.5 pg/mL [0.1 pg/mL - 6.4 pg/mL] (12/22/20  14:46:00)  Assessment/Plan       Estradiol excess (E28.0)         Ordered:          05309 office o/p est mod 30-39 min (Charge), Quantity: 1, Transsexualism  Estradiol excess          Estradiol* ([facility]), Specimen Type: Serum, Collection Date: 01/12/21 13:14:00 CST                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  We will work together as a team to help her transition safely.  We will recheck an estradiol level today and have her resume her estradiol at a lower dose at the correct intervals once her estradiol level is less than 200.  We will plan to recheck estradiol levels based on the results of today's labs.  I expect that we will need to follow-up within 2 to 4 weeks.       prep time 7 min       room time 16 min       chart time 11 min
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
## 2022-02-16 NOTE — TELEPHONE ENCOUNTER
---------------------  From: Chandni Garnica   To: Jamin ALVARENGA, [facility];     Sent: 10/29/2020 7:58:38 AM CDT  Subject: Scheduling Management     Patient no showed appointment today.  Appointment was for a new patient to discuss getting on estrogen and testosterone blockers.
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
## 2022-02-16 NOTE — TELEPHONE ENCOUNTER
Entered by Lisa Mijares MA on January 27, 2021 2:19:42 PM CST  ---------------------  From: Lisa Mijares MA   To: [facility] #[id number]    Sent: 1/27/2021 2:19:42 PM CST  Subject: Medication Management     ** Submitted: **  Order:albuterol (Albuterol (Eqv-ProAir HFA) 90 mcg/inh inhalation aerosol)  2 puff(s)  Inhale  qid  Qty:  3 EA        Refills:  0          Substitutions Allowed     PRN  for wheezing      Route To [facility] #[id number]    Signed by Lisa Mijares MA  1/27/2021 8:18:00 PM [facility]    ** Submitted: **  Complete:albuterol (albuterol 90 mcg/inh inhalation aerosol)   Signed by Lisa Mijares MA  1/27/2021 8:19:00 PM [facility]    ** Not Approved:  **  albuterol (ALBUTEROL HFA INH (200 PUFFS)8.5GM)  INHALE 2 PUFFS BY MOUTH FOUR TIMES DAILY AS NEEDED FOR WHEEZING  Qty:  25.5 gm        Days Supply:  75        Refills:  0          Substitutions Allowed     Route To [facility] #[id number]   Note from Pharmacy:  **Patient requests 90 days supply**  Signed by Lisa Mijares MA            ------------------------------------------  From: [facility] #[id number]  To: Kaylan Neville MD  Sent: January 26, 2021 2:35:08 PM CST  Subject: Medication Management  Due: January 22, 2021 9:58:09 AM CST     ** On Hold Pending Signature **     Dispensed Drug: albuterol (Albuterol (Eqv-ProAir HFA) 90 mcg/inh inhalation aerosol), INHALE 2 PUFFS BY MOUTH FOUR TIMES DAILY AS NEEDED FOR WHEEZING  Quantity: 25.5 gm  Days Supply: 75  Refills: 0  Substitutions Allowed  Notes from Pharmacy: **Patient requests 90 days supply**  ------------------------------------------

## 2022-02-16 NOTE — TELEPHONE ENCOUNTER
---------------------  From: Kaylan Neville MD   To: TOMI Message Pool ([id number] - [place]);     Sent: 11/17/2020 7:26:53 PM CST  Subject: General Message     pls let pt know I have her records from Ms. Schumacher, waiting to gt the records from Dr. Smyth, also need copies of notes from her mental health provider that recommended her HRT,Dr. Smyth records came today 11/24/20. Will send to [place] to get scanned in. The mental Health Provider note is scanned under Sensitive Note from [facility]. This is the only note from this doctor and facility. thanks

## 2022-02-16 NOTE — TELEPHONE ENCOUNTER
---------------------  From: Kaylan Neville MD   To: N(i)Â² Message Pool ([id number] - [place]);     Sent: 12/22/2020 7:02:40 PM CST  Subject: RE: General Message     It was in my in box today.  pt should schedule a follow up.      ---------------------  From: Lola Zurita CMA ([employer] Message Pool ([id number]))   To: Kaylan Neville MD;     Sent: 12/18/2020 4:11:27 PM CST  Subject: FW: General Message     pt advised last Friday that we hadn't seen/rec'd anything per your message last wk   anything since last Friday?      ---------------------  From: Shantelle Dinh   To: N(i)Â² Message Pool ([id number]_[place]);     Sent: 12/18/2020 3:50:57 PM CST  Subject: General Message     Just received a message from patient wondering about us receiving an email from Barak/Fina. Patient wants a call back ASAP. thanksf/u scheduled for 12/29/20

## 2022-02-23 ENCOUNTER — AMBULATORY - RIVER FALLS (OUTPATIENT)
Dept: FAMILY MEDICINE | Facility: CLINIC | Age: 47
End: 2022-02-23
Payer: COMMERCIAL

## 2022-02-26 LAB
Lab: <2 NG/ML
NICOTINE: <2 NG/ML

## 2022-03-08 ENCOUNTER — OFFICE VISIT (OUTPATIENT)
Dept: FAMILY MEDICINE | Facility: CLINIC | Age: 47
End: 2022-03-08
Payer: COMMERCIAL

## 2022-03-08 VITALS
SYSTOLIC BLOOD PRESSURE: 108 MMHG | BODY MASS INDEX: 23.56 KG/M2 | HEART RATE: 74 BPM | DIASTOLIC BLOOD PRESSURE: 70 MMHG | WEIGHT: 150.4 LBS | OXYGEN SATURATION: 95 %

## 2022-03-08 DIAGNOSIS — Z72.0 TOBACCO ABUSE: ICD-10-CM

## 2022-03-08 DIAGNOSIS — Z78.9 MALE-TO-FEMALE TRANSGENDER PERSON: Primary | ICD-10-CM

## 2022-03-08 PROCEDURE — 99213 OFFICE O/P EST LOW 20 MIN: CPT | Performed by: FAMILY MEDICINE

## 2022-03-08 RX ORDER — ESTRADIOL 2 MG/1
8 TABLET ORAL DAILY
Qty: 120 TABLET | Refills: 0 | Status: SHIPPED | OUTPATIENT
Start: 2022-03-08 | End: 2022-07-28

## 2022-03-08 NOTE — PROGRESS NOTES
Assessment & Plan   Problem List Items Addressed This Visit     None      Visit Diagnoses     Male-to-female transgender person    -  Primary    Relevant Medications    estradiol (ESTRACE) 2 MG tablet    Tobacco abuse           we will cont the estradiol 8 mg po qday, pt has surgery coming up for TOP surgery and has date scheduled for bottom surgery    Encouraged continued tobacco cessation, she has managed to quit long enough to be a candidate for her top surgery but plans to resume after surgery             Tobacco Cessation:   reports that he has been smoking. He has been smoking about 0.50 packs per day. He has never used smokeless tobacco.          No follow-ups on file.    Kaylan Neville MD  [facility]    Verna Alvarez is a 46 year old who presents for the following health issues  accompanied by his partner.  Having top surgery tomorrow    Answers for HPI/ROS submitted by the patient on 3/4/2022  How many days per week do you miss taking your medication?: 0  What is the reason for your visit today?: Med check    History of Present Illness       Reason for visit:  Med check   He is taking medications regularly.       Here for med check.         Review of Systems   Neg except as per hpi      Objective    /70   Pulse 74   Wt 68.2 kg (150 lb 6.4 oz)   SpO2 95%   BMI 23.56 kg/m    Body mass index is 23.56 kg/m .  Physical Exam       Exam:  General: alert and oriented ×3 no acute distress.    HEENT: pupils are equal round and reactive to light extraocular motion is intact. Normocephalic and atraumatic.     Hearing is grossly normal and there is no otorrhea.     Chest: has bilateral rise with no increased work of breathing.    Cardiovascular: normal perfusion and brisk capillary refill.    Musculoskeletal: no gross focal abnormalities and normal gait.    Neuro: no gross focal abnormalities and memory seems intact.    Psychiatric: speech is clear and coherent and  fluent. Patient dressed appropriately for the weather. Mood is appropriate and affect is full.        Discussed with patient to return to clinic if symptoms worsen or do not improve

## 2022-03-09 ENCOUNTER — OFFICE VISIT (OUTPATIENT)
Dept: PLASTIC SURGERY | Facility: CLINIC | Age: 47
End: 2022-03-09
Payer: COMMERCIAL

## 2022-03-09 ENCOUNTER — LAB (OUTPATIENT)
Dept: LAB | Facility: CLINIC | Age: 47
End: 2022-03-09
Payer: COMMERCIAL

## 2022-03-09 VITALS
BODY MASS INDEX: 23.34 KG/M2 | WEIGHT: 149 LBS | OXYGEN SATURATION: 98 % | DIASTOLIC BLOOD PRESSURE: 75 MMHG | HEART RATE: 98 BPM | SYSTOLIC BLOOD PRESSURE: 124 MMHG

## 2022-03-09 DIAGNOSIS — Z87.891 PERSONAL HISTORY OF TOBACCO USE, PRESENTING HAZARDS TO HEALTH: ICD-10-CM

## 2022-03-09 DIAGNOSIS — F64.0 GENDER DYSPHORIA IN ADOLESCENT AND ADULT: Primary | ICD-10-CM

## 2022-03-09 DIAGNOSIS — F64.0 GENDER DYSPHORIA IN ADOLESCENT AND ADULT: ICD-10-CM

## 2022-03-09 PROCEDURE — 80323 ALKALOIDS NOS: CPT | Mod: 90 | Performed by: PATHOLOGY

## 2022-03-09 PROCEDURE — 99212 OFFICE O/P EST SF 10 MIN: CPT | Performed by: STUDENT IN AN ORGANIZED HEALTH CARE EDUCATION/TRAINING PROGRAM

## 2022-03-09 PROCEDURE — 99000 SPECIMEN HANDLING OFFICE-LAB: CPT | Performed by: PATHOLOGY

## 2022-03-09 ASSESSMENT — PAIN SCALES - GENERAL: PAINLEVEL: NO PAIN (0)

## 2022-03-09 NOTE — PROGRESS NOTES
PRS    Return for possible preoperative visit following 2 months of no smoking.  With the patient and her partner quit all tobacco products 8 weeks ago.  She stated that a urine cotinine screen was done, but this result was faxed over.  She is willing to repeat the test today.    Letter support for chest surgery has been uploaded and reviewed.     Mammogram reviewed and found to be negative.    If the repeat urine nicotine screen is negative, it is reasonable to proceed with scheduling of bilateral silicone gel augmentation mammaplasty.  Once negative test confirmed, we will initiate prior authorization request. Patient will need a preoperative evaluation with anesthesia.    Discussed at-length both patient goals as well as risks of the operation which include but are not limited to: infection, bleeding, pain, poor scarring, wound healing issues, implant malposition, implant rupture, animation deformity, double-bubble deformity, capsular contracture, DARIAN-ALCL, numbness, asymmetry and need for revisional surgery. Despite these risks, patient would consent to surgery.     Obtained [facility] photo consent and preoperative photography today    Of note, the room smells a bit like cigarette smoke. After the visit, the nurse informed me that the patient and her partner asked if they could take the urine specimen cup home with them. She indicated that they are around cigarette smoke all the time. She still agreed to take the urine screen.    A total of 10 minutes of direct face-to-face counseling, chart review and documentation was performed with the patient during this encounter    Osman Davenport MD, PhD

## 2022-03-09 NOTE — NURSING NOTE
Chief Complaint   Patient presents with     Surgical Followup     gender affirming breast aug -- 6 wk follow up no nicotine       Vitals:    03/09/22 1320   BP: 124/75   Pulse: 98   SpO2: 98%   Weight: 67.6 kg (149 lb)       Body mass index is 23.34 kg/m .          DUY ANTOINE EMT

## 2022-03-09 NOTE — LETTER
3/9/2022       RE: Madi Padilla  [address] [place] [address]     Dear Colleague,    Thank you for referring your patient, Madi Padilla, to the [facility] [place] [facility]. Please see a copy of my visit note below.    PRS  Return for possible preoperative visit following 2 months of no smoking.  With the patient and her partner quit all tobacco products 8 weeks ago.  She stated that a urine cotinine screen was done, but this result was faxed over.  She is willing to repeat the test today.    Letter support for chest surgery has been uploaded and reviewed.     Mammogram reviewed and found to be negative.    If the repeat urine nicotine screen is negative, it is reasonable to proceed with scheduling of bilateral silicone gel augmentation mammaplasty.  Once negative test confirmed, we will initiate prior authorization request. Patient will need a preoperative evaluation with anesthesia.    Discussed at-length both patient goals as well as risks of the operation which include but are not limited to: infection, bleeding, pain, poor scarring, wound healing issues, implant malposition, implant rupture, animation deformity, double-bubble deformity, capsular contracture, DARIAN-ALCL, numbness, asymmetry and need for revisional surgery. Despite these risks, patient would consent to surgery.     Obtained [facility] photo consent and preoperative photography today    Of note, the room smells a bit like cigarette smoke. After the visit, the nurse informed me that the patient and her partner asked if they could take the urine specimen cup home with them. She indicated that they are around cigarette smoke all the time. She still agreed to take the urine screen.    A total of 10 minutes of direct face-to-face counseling, chart review and documentation was performed with the patient during this encounter    Osman  MD Issac, PhD

## 2022-03-15 LAB
ANABASINE UR-MCNC: <5 NG/ML
COTININE UR-MCNC: 19 NG/ML
NICOTINE UR-MCNC: 17 NG/ML
TRANS-3-OH-COTININE UR-MCNC: 217 NG/ML

## 2022-03-27 ENCOUNTER — HEALTH MAINTENANCE LETTER (OUTPATIENT)
Age: 47
End: 2022-03-27

## 2022-03-31 ENCOUNTER — OFFICE VISIT (OUTPATIENT)
Dept: FAMILY MEDICINE | Facility: CLINIC | Age: 47
End: 2022-03-31
Payer: COMMERCIAL

## 2022-03-31 VITALS
SYSTOLIC BLOOD PRESSURE: 120 MMHG | OXYGEN SATURATION: 98 % | DIASTOLIC BLOOD PRESSURE: 64 MMHG | HEART RATE: 75 BPM | WEIGHT: 154 LBS | BODY MASS INDEX: 24.12 KG/M2

## 2022-03-31 DIAGNOSIS — F31.81 BIPOLAR 2 DISORDER (H): ICD-10-CM

## 2022-03-31 DIAGNOSIS — J45.30 MILD PERSISTENT ASTHMA WITHOUT COMPLICATION: ICD-10-CM

## 2022-03-31 DIAGNOSIS — Z72.0 TOBACCO ABUSE: ICD-10-CM

## 2022-03-31 DIAGNOSIS — Z78.9 MALE-TO-FEMALE TRANSGENDER PERSON: Primary | ICD-10-CM

## 2022-03-31 PROCEDURE — 96127 BRIEF EMOTIONAL/BEHAV ASSMT: CPT | Performed by: FAMILY MEDICINE

## 2022-03-31 PROCEDURE — 99215 OFFICE O/P EST HI 40 MIN: CPT | Performed by: FAMILY MEDICINE

## 2022-03-31 RX ORDER — QUETIAPINE FUMARATE 25 MG/1
25 TABLET, FILM COATED ORAL 2 TIMES DAILY
Qty: 180 TABLET | Refills: 1 | Status: SHIPPED | OUTPATIENT
Start: 2022-03-31 | End: 2022-10-31

## 2022-03-31 RX ORDER — QUETIAPINE FUMARATE 25 MG/1
25 TABLET, FILM COATED ORAL
COMMUNITY
Start: 2021-11-18 | End: 2022-03-31

## 2022-03-31 ASSESSMENT — PATIENT HEALTH QUESTIONNAIRE - PHQ9
10. IF YOU CHECKED OFF ANY PROBLEMS, HOW DIFFICULT HAVE THESE PROBLEMS MADE IT FOR YOU TO DO YOUR WORK, TAKE CARE OF THINGS AT HOME, OR GET ALONG WITH OTHER PEOPLE: SOMEWHAT DIFFICULT
SUM OF ALL RESPONSES TO PHQ QUESTIONS 1-9: 15
SUM OF ALL RESPONSES TO PHQ QUESTIONS 1-9: 15
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
## 2022-04-01 ASSESSMENT — PATIENT HEALTH QUESTIONNAIRE - PHQ9: SUM OF ALL RESPONSES TO PHQ QUESTIONS 1-9: 15

## 2022-04-13 ENCOUNTER — TELEPHONE (OUTPATIENT)
Dept: SURGERY | Facility: CLINIC | Age: 47
End: 2022-04-13
Payer: COMMERCIAL

## 2022-04-13 ENCOUNTER — DOCUMENTATION ONLY (OUTPATIENT)
Dept: SURGERY | Facility: CLINIC | Age: 47
End: 2022-04-13
Payer: COMMERCIAL

## 2022-04-13 ENCOUNTER — HOSPITAL ENCOUNTER (OUTPATIENT)
Facility: AMBULATORY SURGERY CENTER | Age: 47
End: 2022-04-13
Attending: STUDENT IN AN ORGANIZED HEALTH CARE EDUCATION/TRAINING PROGRAM
Payer: COMMERCIAL

## 2022-04-13 DIAGNOSIS — F64.0 GENDER DYSPHORIA IN ADOLESCENT AND ADULT: ICD-10-CM

## 2022-04-13 NOTE — PROGRESS NOTES
Sent MyChart to patient regarding scheduling surgery with Dr. Davenport, offered surgery date of 5/10.    Will watch for response and follow up.

## 2022-04-14 DIAGNOSIS — Z11.59 ENCOUNTER FOR SCREENING FOR OTHER VIRAL DISEASES: Primary | ICD-10-CM

## 2022-04-14 NOTE — TELEPHONE ENCOUNTER
"----- Message from  Roro sent at 2022  7:25 PM CDT -----  [facility] any patients not in [place] cannot do a virtual PAC visit unless they drive across the border (I was previously told this was not allowed but it might be allowed again). In person visits are 2-3 weeks out - but I got amira.    Kim has sent MULTIPLE messages despite Gray explaining the backlog.    After setting everything up, Kim had asked me: \"Can you please check on my partners appointment Amisha and let him know\"    I had asked if there was an EFRAIN and noted that I cannot check someone else's appointments unless they are also being scheduled for surgery.    I am realizing now - it seems like both are our patients? I looked at Sonya's chart and see that this patient has now sent two messages this evening asking for the same appointment as Kim on the  and when their surgery is.     I do not know how to proceed because I can't schedule them for the same appointments? PAC also has no in person openings on  and I overbooked Dr. Davenport for Kim. This is a jean area situation.  ___    Surgery is scheduled with Dr. Davenport on 5/10 at the [facility].    H&P to be completed by PAC per Dr. Davenport     :  PAC at 10 AM   Cotinine lab at 11:30 AM  Consult with Dr. Davenport at 1:15 PM - MAKE SURE THEY DID COTININE TEST    COVID-19 test:  at [facility]    Post-op:  at 1 PM     Confirmed with patient via [employer].    I will mail the packet once the patient confirms?    ----- Message -----  From: Gray Sorenson RN  Sent: 2/15/2022   9:03 AM CDT  To: Albert Pierre, EMT, Osman Davenport MD, #    I faxed the lab orders for both Kim Padilla and Sonya Rowley to [facility] this morning.   Gray  ----- Message -----  From: Gray Sorenson RN  Sent: 2022   4:40 PM CST  To: Albert Pierre, EMT, Osman Davenport MD, #    I have been " interacting with both of these patients on [employer]. I've explained many times that they shouldn't get the testing done until 2/23 as that would be 6 weeks from their quit date. If it's positive, they will have to start over. That's per Dr Davenport.     I will fax the orders tomorrow when I'm in the office.    Thank you!        ----- Message -----  From: Isis Villarreal  Sent: 2/14/2022   4:28 PM CST  To: Albert Pierre, EMT, Osman Davenport MD, #    Received call from Stacey Benitez at [facility]. It sounds like there are two patients that the clinic is waiting on for nicotine testinglab orders. [place] does not have access to electronic orders right now, so they have to be manually faxed to the clinic. Could clinic fax the orders to the [place] clinic at fax #[phone]? If there are any questions, you can call Stacey at [phone].     Sonya Rowley [phone]  Kim Padilla (attached)

## 2022-04-14 NOTE — TELEPHONE ENCOUNTER
RN Care Coordinator: Gray Sorenson; [phone] and/or Shama Weber; [phone]    Surgery is scheduled with Dr. Davenport on 5/10 at the [facility].  Scheduled per availability.    H&P to be completed by PAC 4/27    Consult on 4/27  Cotinine test on 4/27    COVID-19 test: 5/6 at [facility]    Post-op: 5/25 in person    Patient will receive surgery arrival and start time from PAC.    Patient will complete COVID-19 test that was scheduled by surgical coordinator 2-4 days prior to surgery.     Confirmed with patient via YOOSE.    The surgery packet will be sent via US mail and via YOOSE

## 2022-04-14 NOTE — TELEPHONE ENCOUNTER
FUTURE VISIT INFORMATION      SURGERY INFORMATION:    Date: 5/10/22    Location:  or    Surgeon:  Osman Davenport MD    Anesthesia Type:  general    Procedure: BILATERAL FEMINIZING BREAST AUGMENTATION WITH USE OF SILICONE SMOOTH ROUND GEL IMPLANTS    Consult: ov 3/9    RECORDS REQUESTED FROM:        Primary Care Provider: Kaylan Neville MD  - [facility]

## 2022-04-15 ENCOUNTER — VIRTUAL VISIT (OUTPATIENT)
Dept: FAMILY MEDICINE | Facility: CLINIC | Age: 47
End: 2022-04-15
Payer: COMMERCIAL

## 2022-04-15 ENCOUNTER — MYC MEDICAL ADVICE (OUTPATIENT)
Dept: ONCOLOGY | Facility: CLINIC | Age: 47
End: 2022-04-15

## 2022-04-15 DIAGNOSIS — F64.0 GENDER DYSPHORIA IN ADOLESCENT AND ADULT: Primary | ICD-10-CM

## 2022-04-15 PROCEDURE — 99212 OFFICE O/P EST SF 10 MIN: CPT | Mod: GT | Performed by: FAMILY MEDICINE

## 2022-04-15 NOTE — PROGRESS NOTES
Kim is a 46 year old who is being evaluated via a billable video visit.      How would you like to obtain your AVS? MyChart  If the video visit is dropped, the invitation should be resent by: Text to cell phone: [phone]  Will anyone else be joining your video visit?  Yes, their partner      Video Start Time: 4:47 PM    Assessment & Plan   Problem List Items Addressed This Visit        Behavioral    Gender dysphoria in adolescent and adult - Primary      She is having surgery later this month.  Would like her to follow-up with me in about 4 weeks to see how she is feeling.                 Return in about 4 weeks (around 5/13/2022) for using a video visit, Follow up, in person.    Kaylan Neville MD  [facility]    Verna Alvarez is a 46 year old who presents for the following health issues  accompanied by his partner.    HPI   Patient has gender dysphoria.  She will be undergoing top surgery later this month.  She is continuing to stay off of nicotine.  Her partner is joined us today for her appointment.  She is hoping to have bottom surgery done later this summer.        Review of Systems         Objective           Vitals:  No vitals were obtained today due to virtual visit.    Physical Exam   GENERAL: Healthy, alert and no distress  EYES: Eyes grossly normal to inspection.  No discharge or erythema, or obvious scleral/conjunctival abnormalities.  RESP: No audible wheeze, cough, or visible cyanosis.  No visible retractions or increased work of breathing.    SKIN: Visible skin clear. No significant rash, abnormal pigmentation or lesions.  NEURO: Cranial nerves grossly intact.  Mentation and speech appropriate for age.  PSYCH: Mentation appears normal, affect normal/bright, judgement and insight intact, normal speech and appearance well-groomed.                Video-Visit Details    Type of service:  Video Visit    Video End Time:1647 PM    Originating Location (pt. Location):  Home    Distant Location (provider location):  [facility]     Platform used for Video Visit: Yadiel

## 2022-04-27 ENCOUNTER — PATIENT OUTREACH (OUTPATIENT)
Dept: PLASTIC SURGERY | Facility: CLINIC | Age: 47
End: 2022-04-27
Payer: COMMERCIAL

## 2022-04-27 ENCOUNTER — PRE VISIT (OUTPATIENT)
Dept: SURGERY | Facility: CLINIC | Age: 47
End: 2022-04-27
Payer: COMMERCIAL

## 2022-04-27 NOTE — TELEPHONE ENCOUNTER
Spoke with pt, she cancelled her appointments for pre-op with Dr. Davenport and PAC for pre-op today. Called pt to verify, she states she wants surgery to be cancelled, will reach out to us in the future if interested in re-visiting the idea of surgery.     Pt wants to still see Dr Lucero for bottom surgery consult as scheduled.    Appointments cancelled, routing to surgery scheduler to remove the case.

## 2022-05-11 ENCOUNTER — DOCUMENTATION ONLY (OUTPATIENT)
Dept: LAB | Facility: CLINIC | Age: 47
End: 2022-05-11
Payer: COMMERCIAL

## 2022-05-11 DIAGNOSIS — F64.0 GENDER DYSPHORIA IN ADOLESCENT AND ADULT: Primary | ICD-10-CM

## 2022-05-12 ENCOUNTER — LAB (OUTPATIENT)
Dept: LAB | Facility: CLINIC | Age: 47
End: 2022-05-12
Payer: COMMERCIAL

## 2022-05-12 DIAGNOSIS — F64.0 GENDER DYSPHORIA IN ADOLESCENT AND ADULT: ICD-10-CM

## 2022-05-12 LAB
ALBUMIN SERPL-MCNC: 3.8 G/DL (ref 3.4–5)
ALP SERPL-CCNC: 55 U/L (ref 40–150)
ALT SERPL W P-5'-P-CCNC: 15 U/L (ref 0–70)
ANION GAP SERPL CALCULATED.3IONS-SCNC: 3 MMOL/L (ref 3–14)
AST SERPL W P-5'-P-CCNC: 21 U/L (ref 0–45)
BILIRUB SERPL-MCNC: 0.5 MG/DL (ref 0.2–1.3)
BUN SERPL-MCNC: 13 MG/DL (ref 7–30)
CALCIUM SERPL-MCNC: 9.2 MG/DL (ref 8.5–10.1)
CHLORIDE BLD-SCNC: 107 MMOL/L (ref 94–109)
CO2 SERPL-SCNC: 31 MMOL/L (ref 20–32)
CREAT SERPL-MCNC: 0.71 MG/DL (ref 0.52–1.25)
GFR SERPL CREATININE-BSD FRML MDRD: >90 ML/MIN/1.73M2
GLUCOSE BLD-MCNC: 111 MG/DL (ref 70–99)
HGB BLD-MCNC: 12.3 G/DL (ref 11.7–17.7)
POTASSIUM BLD-SCNC: 4.3 MMOL/L (ref 3.4–5.3)
PROT SERPL-MCNC: 7 G/DL (ref 6.8–8.8)
SODIUM SERPL-SCNC: 141 MMOL/L (ref 133–144)

## 2022-05-12 PROCEDURE — 36415 COLL VENOUS BLD VENIPUNCTURE: CPT

## 2022-05-12 PROCEDURE — 85018 HEMOGLOBIN: CPT | Mod: QW

## 2022-05-12 PROCEDURE — 84403 ASSAY OF TOTAL TESTOSTERONE: CPT | Performed by: FAMILY MEDICINE

## 2022-05-12 PROCEDURE — 80053 COMPREHEN METABOLIC PANEL: CPT

## 2022-05-12 PROCEDURE — 82670 ASSAY OF TOTAL ESTRADIOL: CPT | Performed by: FAMILY MEDICINE

## 2022-05-17 ENCOUNTER — OFFICE VISIT (OUTPATIENT)
Dept: FAMILY MEDICINE | Facility: CLINIC | Age: 47
End: 2022-05-17
Payer: COMMERCIAL

## 2022-05-17 VITALS
HEART RATE: 68 BPM | OXYGEN SATURATION: 98 % | WEIGHT: 154.5 LBS | DIASTOLIC BLOOD PRESSURE: 64 MMHG | SYSTOLIC BLOOD PRESSURE: 100 MMHG | BODY MASS INDEX: 24.2 KG/M2

## 2022-05-17 DIAGNOSIS — Z59.00 HOMELESS: ICD-10-CM

## 2022-05-17 DIAGNOSIS — F32.1 CURRENT MODERATE EPISODE OF MAJOR DEPRESSIVE DISORDER WITHOUT PRIOR EPISODE (H): ICD-10-CM

## 2022-05-17 DIAGNOSIS — Z72.0 TOBACCO ABUSE: ICD-10-CM

## 2022-05-17 DIAGNOSIS — F64.0 GENDER DYSPHORIA IN ADOLESCENT AND ADULT: Primary | ICD-10-CM

## 2022-05-17 DIAGNOSIS — R73.03 PREDIABETES: ICD-10-CM

## 2022-05-17 LAB — TESTOST SERPL-MCNC: 8 NG/DL (ref 8–950)

## 2022-05-17 PROCEDURE — 99214 OFFICE O/P EST MOD 30 MIN: CPT | Performed by: FAMILY MEDICINE

## 2022-05-17 RX ORDER — MOMETASONE FUROATE AND FORMOTEROL FUMARATE DIHYDRATE 100; 5 UG/1; UG/1
AEROSOL RESPIRATORY (INHALATION)
COMMUNITY
Start: 2021-09-07 | End: 2022-10-31

## 2022-05-17 RX ORDER — FLUTICASONE PROPIONATE 220 UG/1
AEROSOL, METERED RESPIRATORY (INHALATION)
COMMUNITY
Start: 2021-06-16 | End: 2022-10-31

## 2022-05-17 SDOH — ECONOMIC STABILITY - HOUSING INSECURITY: HOMELESSNESS UNSPECIFIED: Z59.00

## 2022-05-17 ASSESSMENT — PATIENT HEALTH QUESTIONNAIRE - PHQ9
10. IF YOU CHECKED OFF ANY PROBLEMS, HOW DIFFICULT HAVE THESE PROBLEMS MADE IT FOR YOU TO DO YOUR WORK, TAKE CARE OF THINGS AT HOME, OR GET ALONG WITH OTHER PEOPLE: SOMEWHAT DIFFICULT
SUM OF ALL RESPONSES TO PHQ QUESTIONS 1-9: 8
SUM OF ALL RESPONSES TO PHQ QUESTIONS 1-9: 8

## 2022-05-17 NOTE — PROGRESS NOTES
Chief Complaint   Patient presents with     RECHECK       Assessment & Plan   Problem List Items Addressed This Visit        Behavioral    Gender dysphoria in adolescent and adult - Primary      Other Visit Diagnoses     Tobacco abuse        Prediabetes        Homeless        Current moderate episode of major depressive disorder without prior episode (H)             Will await the results of the estradiol checked last week.  We will adjust estradiol prescription based on these results.  Patient is currently on 80 mg p.o. daily.    Prediabetes encouraged patient to practice therapeutic lifestyle changes including increased physical activity.  We can check this again in 6 months.    Major depression a lot of patient's symptoms are related to poor choices in the past that had resulted in incarceration that continue to affect her ability to find employment and housing.  Patient has good support with her partner and has establish care with a counselor in the community.  She will continue to work with her counselor.    History of tobacco abuse cessation encouraged                 No follow-ups on file.    Kaylan Neville MD  [facility]    Verna Alvarez is a 46 year old who presents for the following health issues     History of Present Illness       Mental Health Follow-up:                    Today's PHQ-9         PHQ-9 Total Score: 8  PHQ-9 Q9 Thoughts of better off dead/self-harm past 2 weeks :   (P) Not at all    How difficult have these problems made it for you to do your work, take care of things at home, or get along with other people: Somewhat difficult        Reason for visit:  Med check  Symptom onset:  Today  Symptoms include:  None  Symptom intensity:  Mild  Symptom progression:  Staying the same  Had these symptoms before:  No  What makes it worse:  No  What makes it better:  Nope    He eats 0-1 servings of fruits and vegetables daily.He consumes 0 sweetened beverage(s)  daily.He exercises with enough effort to increase his heart rate 9 or less minutes per day.  He exercises with enough effort to increase his heart rate 3 or less days per week.   He is taking medications regularly.       Patient tells me that her top surgery was canceled because the surgeon was going to be out of town however chart review shows that patient had requested that it was canceled.  We will have to ask patient if this was due to difficulty becoming a non-smoker prior to surgery.    Her partner is with her today.  They talk about how patient has court in [place] the end of August.  This will decide if patient is put on probation in either [place] or [place], if patient will become incarcerated again, or if patient will not be allowed to return to the [facility].    They are currently living with family members while awaiting to find out the results of the court appearance in August.    She had an appointment with urology to discuss bottom surgery.  Not sure how this is going to affect ability to make it to court or have follow-up after the surgery.  Pt is on 8 mg estradiol po qday, had level drawn last week, results pending,         Review of Systems         Objective    /64 (BP Location: Right arm, Patient Position: Sitting)   Pulse 68   Wt 70.1 kg (154 lb 8 oz)   SpO2 98%   BMI 24.20 kg/m    Body mass index is 24.2 kg/m .  Physical Exam

## 2022-05-18 ASSESSMENT — PATIENT HEALTH QUESTIONNAIRE - PHQ9: SUM OF ALL RESPONSES TO PHQ QUESTIONS 1-9: 8

## 2022-05-20 ENCOUNTER — DOCUMENTATION ONLY (OUTPATIENT)
Dept: LAB | Facility: CLINIC | Age: 47
End: 2022-05-20
Payer: COMMERCIAL

## 2022-05-20 DIAGNOSIS — Z78.9 TRANSGENDER: Primary | ICD-10-CM

## 2022-05-25 LAB — ESTRADIOL SERPL HS-MCNC: 2 PG/ML

## 2022-06-08 ENCOUNTER — LAB (OUTPATIENT)
Dept: LAB | Facility: CLINIC | Age: 47
End: 2022-06-08
Payer: COMMERCIAL

## 2022-06-08 DIAGNOSIS — Z78.9 TRANSGENDER: ICD-10-CM

## 2022-06-08 DIAGNOSIS — Z87.891 PERSONAL HISTORY OF TOBACCO USE, PRESENTING HAZARDS TO HEALTH: ICD-10-CM

## 2022-06-08 LAB — ESTRADIOL SERPL-MCNC: 112 PG/ML

## 2022-06-08 PROCEDURE — 80323 ALKALOIDS NOS: CPT | Mod: 90 | Performed by: FAMILY MEDICINE

## 2022-06-08 PROCEDURE — 82670 ASSAY OF TOTAL ESTRADIOL: CPT

## 2022-06-08 PROCEDURE — 36415 COLL VENOUS BLD VENIPUNCTURE: CPT

## 2022-06-08 PROCEDURE — 99000 SPECIMEN HANDLING OFFICE-LAB: CPT | Performed by: FAMILY MEDICINE

## 2022-06-10 LAB
ANABASINE UR-MCNC: <5 NG/ML
COTININE UR-MCNC: 81 NG/ML
NICOTINE UR-MCNC: <15 NG/ML
TRANS-3-OH-COTININE UR-MCNC: 1134 NG/ML

## 2022-06-14 ENCOUNTER — TELEPHONE (OUTPATIENT)
Dept: PLASTIC SURGERY | Facility: CLINIC | Age: 47
End: 2022-06-14

## 2022-06-14 NOTE — TELEPHONE ENCOUNTER
Writer attemped to call pt, unable to LVM. Nic is not able to do video visits during in person clinic.     Isis

## 2022-06-14 NOTE — TELEPHONE ENCOUNTER
[facility] Call Center    Phone Message    May a detailed message be left on voicemail: yes     Reason for Call: Other: Pt is requesting a call back please, they are wanting to know if todays visit can be done via phone or video instead of in clinic. Please advise. Thank you!      Action Taken: Message routed to:  [facility]    Travel Screening: Not Applicable

## 2022-06-29 ENCOUNTER — VIRTUAL VISIT (OUTPATIENT)
Dept: PLASTIC SURGERY | Facility: CLINIC | Age: 47
End: 2022-06-29
Payer: COMMERCIAL

## 2022-06-29 VITALS — BODY MASS INDEX: 23.49 KG/M2 | WEIGHT: 150 LBS

## 2022-06-29 DIAGNOSIS — F64.0 GENDER DYSPHORIA IN ADOLESCENT AND ADULT: Primary | ICD-10-CM

## 2022-06-29 PROCEDURE — 99212 OFFICE O/P EST SF 10 MIN: CPT | Mod: 95 | Performed by: STUDENT IN AN ORGANIZED HEALTH CARE EDUCATION/TRAINING PROGRAM

## 2022-06-29 ASSESSMENT — PAIN SCALES - GENERAL: PAINLEVEL: NO PAIN (0)

## 2022-06-29 NOTE — LETTER
Date:June 30, 2022      Provider requested that no letter be sent. Do not send.       [facility]

## 2022-06-29 NOTE — PROGRESS NOTES
Kim is a 46 year old who is being evaluated via a billable video visit.      How would you like to obtain your AVS? MyChart  If the video visit is dropped, the invitation should be resent by: Text to cell phone: [phone]  Will anyone else be joining your video visit? No        Video-Visit Details    Video Start Time: 1400    Type of service:  Video Visit    Video End Time: 1410    Originating Location (pt. Location): Home    Distant Location (provider location):  [facility] [place]     Platform used for Video Visit: KnowNow

## 2022-06-29 NOTE — NURSING NOTE
Chief Complaint   Patient presents with     Consult     F/up urine cotinine test       Vitals:    06/29/22 1402   Weight: 68 kg (150 lb)       Body mass index is 23.49 kg/m .          DUY ANTOINE EMT

## 2022-06-30 NOTE — PROGRESS NOTES
PRS    Telephone start: 1400  Telephone end: 1410  Kim and her significant other Sonya on the call    Discussed recent cancellations for positive nicotine test. Both Kim and Sonya still use nicotine and understands the need for cancelling their cases. Explained or reiterated that smoking or nicotine use may contribute to increased risk of infection or wound healing issues. Patient understands. Patient will contact us for next in-person visit once without all nicotine products for at least 6 weeks. At the next visit, we will send urine cotinine screen, to which patient is agreeable. All questions answered.     Osman Davenport MD, PhD

## 2022-08-09 ENCOUNTER — OFFICE VISIT (OUTPATIENT)
Dept: PLASTIC SURGERY | Facility: CLINIC | Age: 47
End: 2022-08-09
Payer: COMMERCIAL

## 2022-08-09 ENCOUNTER — LAB (OUTPATIENT)
Dept: LAB | Facility: CLINIC | Age: 47
End: 2022-08-09
Payer: COMMERCIAL

## 2022-08-09 VITALS
WEIGHT: 154 LBS | SYSTOLIC BLOOD PRESSURE: 121 MMHG | HEART RATE: 89 BPM | BODY MASS INDEX: 24.17 KG/M2 | HEIGHT: 67 IN | OXYGEN SATURATION: 97 % | DIASTOLIC BLOOD PRESSURE: 77 MMHG

## 2022-08-09 DIAGNOSIS — Z71.6 ENCOUNTER FOR SMOKING CESSATION COUNSELING: Primary | ICD-10-CM

## 2022-08-09 DIAGNOSIS — Z71.6 ENCOUNTER FOR SMOKING CESSATION COUNSELING: ICD-10-CM

## 2022-08-09 DIAGNOSIS — F64.0 GENDER DYSPHORIA IN ADOLESCENT AND ADULT: ICD-10-CM

## 2022-08-09 PROCEDURE — 99215 OFFICE O/P EST HI 40 MIN: CPT | Performed by: UROLOGY

## 2022-08-09 PROCEDURE — 80323 ALKALOIDS NOS: CPT | Mod: 90 | Performed by: PATHOLOGY

## 2022-08-09 PROCEDURE — 99000 SPECIMEN HANDLING OFFICE-LAB: CPT | Performed by: PATHOLOGY

## 2022-08-09 ASSESSMENT — PAIN SCALES - GENERAL: PAINLEVEL: NO PAIN (0)

## 2022-08-09 NOTE — PROGRESS NOTES
"RETURN NOTE    CC: gender dysphoria     45yo transgender female.  Patient has been living as a female for 5 years.  Preferred pronouns are: she/her  The patient has been on exogenous hormones since: 6-7 years.  In terms of an intimate relationship, the patient is in a relationship with a enmanueleBeverly who is present for this visit .  Has had previous orchiectomy   Her goals are vaginoplasty   She is trying to quit smoking     /77   Pulse 89   Ht 1.702 m (5' 7\")   Wt 69.9 kg (154 lb)   SpO2 97%   BMI 24.12 kg/m    Abdomen: not obese, soft, non-distended, non-tender. No organomegaly  : circumcised, testicles absent. Hirsute genitalia.  LE: no edema.                  Assessment and Plan:   46 year old transgender with gender dysphoria    The criteria for genital surgery are specific to the type of surgery being requested.  Criteria for bottom surgery:    1. Persistent, well documented gender dysphoria;  2. Capacity to make a fully informed decision and to consent for treatment;  3. Age of consent (>18 years old)  4. If significant medical or mental health concerns are present, they must be well controlled.  5. 12 continuous months of hormone therapy as appropriate to the patient s gender goals (unless  the patient has a medical contraindication or is otherwise unable or unwilling to take  Hormones).  6. Two letters of support    The aim of hormone therapy prior to gonadectomy is primarily to introduce a period of reversible  estrogen or testosterone suppression, before the patient undergoes irreversible surgical intervention.    She has a persistent, well documented gender dysphoria. She has capacity to make a fully informed decision and to consent for treatment. Her mental health issues are well controlled. She has been on continuous hormones for years. She has 2 letters of support.     The patient meets all of these criteria. We discussed that gender affirmation surgery should be considered " permanent. We discussed risks/complications of rectal injury, rectovaginal fistula, bleeding, fluid collection, infection, injury to surrounding structures, flap loss, sensory loss, wound dehiscence, vaginal prolapse, vaginal shrinkage/stenosis, need for lifelong dilation, urinary stream abnormalities, DVT/PE and need for revision surgery.     We discussed the option for minimal depth and full depth. She would like minimal-depth vaginoplasty     We also discussed the need to stop hormones sary-procedurally for 2 weeks before and after surgery.     We discussed that transgender vaginoplasty for this patient would include: penectomy, clitoroplasty, labiaplasty, urethral reconstruction, creation of a vagina, and scrotectomy.     PLAN:   Needs to have her letters updated  Needs nicotine test today   Once letters are in and cotinine test negative, will PA for minimal depth vaginoplasty    Physician Attestation   I, Golden Lucero MD, saw this patient and agree with the findings and plan of care as documented in the note.      Golden Lucero MD  Reconstructive Urology      40 minutes spent on the date of the encounter doing chart review, history and exam, documentation and further activities per the note

## 2022-08-09 NOTE — PROGRESS NOTES
[facility] Consult H&P    Name: Madi Padilla    MRN: [id number]   YOB: 1975                 Chief Complaint:   Gender Dysphoria          History of Present Illness:   Madi Padilla is a 46 year old transgender female seen in consultation for gender dysphoria    Patient has been living as a female for 5 years.  Preferred pronouns are: she/her  The patient has been on exogenous hormones since: 6-7 years.  In terms of an intimate relationship, the patient is in a relationship with a transfemale.  In terms of fertility, the patient: prior orchi    The patient has obtained two letters of support dated 2021    The patient has previously undergone orchiectomy many years ago. This was performed in [place].    Long-term surgical goals for the patient include: vaginoplasty    The patient is here today expressing interest in vaginoplasty.         Past Medical History:     Past Medical History:   Diagnosis Date     COPD without exacerbation (H)             Past Surgical History:     Past Surgical History:   Procedure Laterality Date     ORCHIECTOMY SCROTAL BILATERAL              Social History:     Social History     Tobacco Use     Smoking status: Former Smoker     Packs/day: 0.50     Quit date: 2022     Years since quittin.5     Smokeless tobacco: Never Used   Substance Use Topics     Alcohol use: Not on file            Family History:   No family history on file.         Allergies:     Allergies   Allergen Reactions     Penicillins Swelling            Medications:     Current Outpatient Medications   Medication Sig     albuterol (PROVENTIL) 2 MG tablet Take 2 mg by mouth 3 times daily     DULERA 100-5 MCG/ACT inhaler INHALE 2 PUFFS BY MOUTH TWICE DAILY     estradiol (ESTRACE) 2 MG tablet TAKE FOUR TABLETS BY MOUTH ONCE DAILY     FLOVENT  MCG/ACT inhaler INHALE 2 PUFFS BY MOUTH TWICE DAILY     QUEtiapine (SEROQUEL) 25 MG tablet Take 1 tablet (25 mg) by  "mouth 2 times daily     No current facility-administered medications for this visit.          Physical Exam:   /77   Pulse 89   Ht 1.702 m (5' 7\")   Wt 69.9 kg (154 lb)   SpO2 97%   BMI 24.12 kg/m    General: age-appropriate in NAD  HEENT: Head AT/NC, EOMI, CN Grossly intact  Resp: no respiratory distress, lung sounds clear.  CV: heart rate regular, S1, S2.  Lymph: No cervical, supraclavicular or axillary lymphadenopathy  Back: bony spine is non-tender, flanks are nontender  Abdomen: notobese, soft, non-distended, non-tender. No organomegaly  : circumcised, descended testicles. Hirsute genitalia.  LE: no edema.   Neuro: grossly intact  Motor: excellent strength throughout  Skin: clear of rashes or ecchymoses.        Labs:    All laboratory data reviewed with patient  Significant for none      Office Studies:    none      Imaging:    All imaging reviewed with patient.  Significant for none      Outside records:    I spent 10 minutes reviewing outside records.         Assessment and Plan:   46 year old transgender female with:  1. Gender Dysphoria    Patient desires vaginoplasty however is not sure if she would prefer a minimal depth or full depth. She has already had an orchiectomy.    She has a persistent, well documented gender dysphoria. She has capacity to make a fully informed decision and to consent for treatment. Her mental health issues are well controlled. She has been on continuous hormones for years. She two letters of support.     The patient meets all of these criteria. We discussed that gender affirmation surgery should be considered permanent. We discussed risks/complications of rectal injury, rectovaginal fistula, bleeding, fluid collection, infection, injury to surrounding structures, flap loss, sensory loss, wound dehiscence, vaginal prolapse, vaginal shrinkage/stenosis, need for lifelong dilation, urinary stream abnormalities, DVT/PE and need for revision surgery.     We also discussed " the need to stop hormones sary-procedurally for 2 weeks before and after surgery.     We discussed that transgender vaginoplasty for this patient would include: penectomy,  clitoroplasty, labiaplasty, urethral reconstruction, creation of a vagina, skin graft, colpopexy to suspend the vagina, and scrotectomy.     needs hair removal if wants to do full depth    needs prior auth    Is going to see plastics for breast reconstruction tomorrow. She would proceed with top surgery prior to bottom surgery.     Follow up in 6 months after top surgery to discuss again.  Leaning towards minimal depth after today's discussion. Should have top surgery first    Also seeing her partner, a transgender woman who also wants a minimal depth vaginoplasty.    Golden Lucero MD   Reconstructive Urology  [facility]      60 minutes spent on the date of the encounter doing chart review, history and exam, documentation and further activities per the note  {Provider  Link to [facility] Help Grid :[id number]}

## 2022-08-09 NOTE — LETTER
"8/9/2022       RE: Madi Padilla   [facility] [place] [address]     Dear Colleague,    Thank you for referring your patient, Madi Padilla, to the [facility]ERY CL[facility] [place] at [facility]. Please see a copy of my visit note below.    RETURN NOTE    CC: gender dysphoria     47yo transgender female.  Patient has been living as a female for 5 years.  Preferred pronouns are: she/her  The patient has been on exogenous hormones since: 6-7 years.  In terms of an intimate relationship, the patient is in a relationship with a Beverly camacho who is present for this visit .  Has had previous orchiectomy   Her goals are vaginoplasty   She is trying to quit smoking     /77   Pulse 89   Ht 1.702 m (5' 7\")   Wt 69.9 kg (154 lb)   SpO2 97%   BMI 24.12 kg/m    Abdomen: not obese, soft, non-distended, non-tender. No organomegaly  : circumcised, testicles absent. Hirsute genitalia.  LE: no edema.                  Assessment and Plan:   46 year old transgender with gender dysphoria    The criteria for genital surgery are specific to the type of surgery being requested.  Criteria for bottom surgery:    1. Persistent, well documented gender dysphoria;  2. Capacity to make a fully informed decision and to consent for treatment;  3. Age of consent (>18 years old)  4. If significant medical or mental health concerns are present, they must be well controlled.  5. 12 continuous months of hormone therapy as appropriate to the patient s gender goals (unless  the patient has a medical contraindication or is otherwise unable or unwilling to take  Hormones).  6. Two letters of support    The aim of hormone therapy prior to gonadectomy is primarily to introduce a period of reversible  estrogen or testosterone suppression, before the patient undergoes irreversible surgical intervention.    She has a persistent, well " documented gender dysphoria. She has capacity to make a fully informed decision and to consent for treatment. Her mental health issues are well controlled. She has been on continuous hormones for years. She has 2 letters of support.     The patient meets all of these criteria. We discussed that gender affirmation surgery should be considered permanent. We discussed risks/complications of rectal injury, rectovaginal fistula, bleeding, fluid collection, infection, injury to surrounding structures, flap loss, sensory loss, wound dehiscence, vaginal prolapse, vaginal shrinkage/stenosis, need for lifelong dilation, urinary stream abnormalities, DVT/PE and need for revision surgery.     We discussed the option for minimal depth and full depth. She would like minimal-depth vaginoplasty     We also discussed the need to stop hormones sary-procedurally for 2 weeks before and after surgery.     We discussed that transgender vaginoplasty for this patient would include: penectomy, clitoroplasty, labiaplasty, urethral reconstruction, creation of a vagina, and scrotectomy.     PLAN:   Needs to have her letters updated  Needs nicotine test today   Once letters are in and cotinine test negative, will PA for minimal depth vaginoplasty    Physician Attestation   I, Golden Lucero MD, saw this patient and agree with the findings and plan of care as documented in the note.      Golden Lucero MD  Reconstructive Urology      40 minutes spent on the date of the encounter doing chart review, history and exam, documentation and further activities per the note

## 2022-08-09 NOTE — NURSING NOTE
"Chief Complaint   Patient presents with     RECHECK     Return vaginoplasty       Vitals:    08/09/22 1441   BP: 121/77   Pulse: 89   SpO2: 97%   Weight: 69.9 kg (154 lb)   Height: 1.702 m (5' 7\")       Body mass index is 24.12 kg/m .          DUY ANTOINE EMT    "

## 2022-08-12 LAB
ANABASINE UR-MCNC: <5 NG/ML
COTININE UR-MCNC: <15 NG/ML
NICOTINE UR-MCNC: <15 NG/ML
TRANS-3-OH-COTININE UR-MCNC: <50 NG/ML

## 2022-09-25 ENCOUNTER — HEALTH MAINTENANCE LETTER (OUTPATIENT)
Age: 47
End: 2022-09-25

## 2022-10-10 ENCOUNTER — DOCUMENTATION ONLY (OUTPATIENT)
Dept: LAB | Facility: CLINIC | Age: 47
End: 2022-10-10

## 2022-10-17 ENCOUNTER — LAB (OUTPATIENT)
Dept: LAB | Facility: CLINIC | Age: 47
End: 2022-10-17
Payer: COMMERCIAL

## 2022-10-17 DIAGNOSIS — F64.0 TRANSSEXUALISM: ICD-10-CM

## 2022-10-17 LAB
ERYTHROCYTE [DISTWIDTH] IN BLOOD BY AUTOMATED COUNT: 13.3 % (ref 10–15)
ESTRADIOL SERPL-MCNC: 72 PG/ML
HCT VFR BLD AUTO: 40 % (ref 35–53)
HGB BLD-MCNC: 12.8 G/DL (ref 11.7–17.7)
MCH RBC QN AUTO: 31.1 PG (ref 26.5–33)
MCHC RBC AUTO-ENTMCNC: 32 G/DL (ref 31.5–36.5)
MCV RBC AUTO: 97 FL (ref 78–100)
PLATELET # BLD AUTO: 240 10E3/UL (ref 150–450)
RBC # BLD AUTO: 4.12 10E6/UL (ref 3.8–5.9)
WBC # BLD AUTO: 7.5 10E3/UL (ref 4–11)

## 2022-10-17 PROCEDURE — 85027 COMPLETE CBC AUTOMATED: CPT

## 2022-10-17 PROCEDURE — 82670 ASSAY OF TOTAL ESTRADIOL: CPT

## 2022-10-17 PROCEDURE — 36415 COLL VENOUS BLD VENIPUNCTURE: CPT

## 2022-10-21 NOTE — RESULT ENCOUNTER NOTE
Dear Kim,     Here are your recent results.       I was expecting to see you in this week so that we can go over your results.  You have been missing too many of our appointments.  If you cannot make it here in person then please get it converted to a video or telephone appointment.  We should have a follow-up appointment.  Were overdue.    Please let us know if you have any questions or concerns.    Regards,  Kaylan Neville MD

## 2022-10-31 ENCOUNTER — OFFICE VISIT (OUTPATIENT)
Dept: FAMILY MEDICINE | Facility: CLINIC | Age: 47
End: 2022-10-31
Payer: COMMERCIAL

## 2022-10-31 VITALS
SYSTOLIC BLOOD PRESSURE: 98 MMHG | DIASTOLIC BLOOD PRESSURE: 60 MMHG | RESPIRATION RATE: 16 BRPM | HEART RATE: 87 BPM | BODY MASS INDEX: 24.07 KG/M2 | WEIGHT: 153.7 LBS | OXYGEN SATURATION: 98 %

## 2022-10-31 DIAGNOSIS — Z71.6 TOBACCO ABUSE COUNSELING: ICD-10-CM

## 2022-10-31 DIAGNOSIS — J45.30 MILD PERSISTENT ASTHMA WITHOUT COMPLICATION: ICD-10-CM

## 2022-10-31 DIAGNOSIS — F31.81 BIPOLAR 2 DISORDER (H): ICD-10-CM

## 2022-10-31 DIAGNOSIS — Z72.0 TOBACCO ABUSE: ICD-10-CM

## 2022-10-31 DIAGNOSIS — F64.0 TRANSSEXUALISM: Primary | ICD-10-CM

## 2022-10-31 PROCEDURE — 99214 OFFICE O/P EST MOD 30 MIN: CPT | Performed by: FAMILY MEDICINE

## 2022-10-31 RX ORDER — TIOTROPIUM BROMIDE 18 UG/1
18 CAPSULE ORAL; RESPIRATORY (INHALATION) DAILY
Qty: 90 CAPSULE | Refills: 1 | Status: SHIPPED | OUTPATIENT
Start: 2022-10-31 | End: 2023-04-27

## 2022-10-31 RX ORDER — ESTRADIOL 2 MG/1
8 TABLET ORAL DAILY
Qty: 120 TABLET | Refills: 0 | Status: SHIPPED | OUTPATIENT
Start: 2022-10-31 | End: 2022-11-17

## 2022-10-31 RX ORDER — QUETIAPINE FUMARATE 25 MG/1
25 TABLET, FILM COATED ORAL 2 TIMES DAILY
Qty: 180 TABLET | Refills: 1 | Status: SHIPPED | OUTPATIENT
Start: 2022-10-31 | End: 2023-04-12

## 2022-10-31 RX ORDER — MOMETASONE FUROATE AND FORMOTEROL FUMARATE DIHYDRATE 100; 5 UG/1; UG/1
2 AEROSOL RESPIRATORY (INHALATION) 2 TIMES DAILY
Qty: 39 G | Refills: 3 | Status: SHIPPED | OUTPATIENT
Start: 2022-10-31 | End: 2023-02-27

## 2022-10-31 ASSESSMENT — PATIENT HEALTH QUESTIONNAIRE - PHQ9
10. IF YOU CHECKED OFF ANY PROBLEMS, HOW DIFFICULT HAVE THESE PROBLEMS MADE IT FOR YOU TO DO YOUR WORK, TAKE CARE OF THINGS AT HOME, OR GET ALONG WITH OTHER PEOPLE: NOT DIFFICULT AT ALL
SUM OF ALL RESPONSES TO PHQ QUESTIONS 1-9: 8
SUM OF ALL RESPONSES TO PHQ QUESTIONS 1-9: 8

## 2022-10-31 ASSESSMENT — ASTHMA QUESTIONNAIRES: ACT_TOTALSCORE: 13

## 2022-10-31 NOTE — PATIENT INSTRUCTIONS
[facility]  Dental clinic  [address] [place] [address]    [phone]

## 2022-10-31 NOTE — PROGRESS NOTES
Assessment & Plan   Problem List Items Addressed This Visit        Other    Gender dysphoria in adolescent and adult - Primary    Relevant Medications    estradiol (ESTRACE) 2 MG tablet    Other Relevant Orders    Estradiol   Other Visit Diagnoses     Mild persistent asthma without complication        Relevant Medications    DULERA 100-5 MCG/ACT inhaler    tiotropium (SPIRIVA) 18 MCG inhaled capsule    Spacer/Aero-Holding Chambers (SPACER/AERO-HOLD CHAMBER MASK) CONSUELO    Bipolar 2 disorder (H)        Relevant Medications    QUEtiapine (SEROQUEL) 25 MG tablet         Gender dysphoria patient has been out of their estradiol.  We will resume the estradiol and then get labs checked in about 2 weeks and follow-up after labs are drawn.    Asthma currently poorly controlled.  Patient has been out of their Dulera and they do not have a spacer for their albuterol.  We will resume the Dulera and also add some Spiriva.  We will see how patient is doing in 2 weeks.    Poor dentition referred patient to the [facility] in [place].  Patient would like to get all of her teeth removed so they can get dentures.                       Return in about 2 weeks (around 11/14/2022).    Kaylan Neville MD  [facility]    Verna Alvarez is a 47 year old, presenting for the following health issues:  Follow Up      History of Present Illness       Reason for visit:  Meds    She eats 0-1 servings of fruits and vegetables daily.She consumes 1 sweetened beverage(s) daily.She exercises with enough effort to increase her heart rate 10 to 19 minutes per day.  She exercises with enough effort to increase her heart rate 3 or less days per week.   She is taking medications regularly.    Today's PHQ-9         PHQ-9 Total Score: 8    PHQ-9 Q9 Thoughts of better off dead/self-harm past 2 weeks :   Not at all    How difficult have these problems made it for you to do your work, take care of things at home, or get  along with other people: Not difficult at all             Review of Systems         Objective    BP 98/60 (BP Location: Right arm, Patient Position: Sitting)   Pulse 87   Resp 16   Wt 69.7 kg (153 lb 11.2 oz)   SpO2 98%   BMI 24.07 kg/m    Body mass index is 24.07 kg/m .  Physical Exam

## 2022-11-07 DIAGNOSIS — Z87.891 PERSONAL HISTORY OF TOBACCO USE, PRESENTING HAZARDS TO HEALTH: Primary | ICD-10-CM

## 2022-11-14 ENCOUNTER — LAB (OUTPATIENT)
Dept: LAB | Facility: CLINIC | Age: 47
End: 2022-11-14
Payer: COMMERCIAL

## 2022-11-14 DIAGNOSIS — F64.0 TRANSSEXUALISM: ICD-10-CM

## 2022-11-14 LAB — ESTRADIOL SERPL-MCNC: 181 PG/ML

## 2022-11-14 PROCEDURE — 36415 COLL VENOUS BLD VENIPUNCTURE: CPT

## 2022-11-14 PROCEDURE — 82670 ASSAY OF TOTAL ESTRADIOL: CPT

## 2022-11-17 ENCOUNTER — OFFICE VISIT (OUTPATIENT)
Dept: FAMILY MEDICINE | Facility: CLINIC | Age: 47
End: 2022-11-17
Payer: COMMERCIAL

## 2022-11-17 VITALS
HEART RATE: 81 BPM | WEIGHT: 156.3 LBS | BODY MASS INDEX: 24.48 KG/M2 | DIASTOLIC BLOOD PRESSURE: 77 MMHG | SYSTOLIC BLOOD PRESSURE: 111 MMHG | OXYGEN SATURATION: 99 % | RESPIRATION RATE: 16 BRPM

## 2022-11-17 DIAGNOSIS — F64.0 TRANSSEXUALISM: ICD-10-CM

## 2022-11-17 DIAGNOSIS — Z78.9 MALE-TO-FEMALE TRANSGENDER PERSON: Primary | ICD-10-CM

## 2022-11-17 DIAGNOSIS — J42 CHRONIC BRONCHITIS, UNSPECIFIED CHRONIC BRONCHITIS TYPE (H): ICD-10-CM

## 2022-11-17 PROCEDURE — 99214 OFFICE O/P EST MOD 30 MIN: CPT | Performed by: FAMILY MEDICINE

## 2022-11-17 RX ORDER — ALBUTEROL SULFATE 90 UG/1
2 AEROSOL, METERED RESPIRATORY (INHALATION) EVERY 4 HOURS PRN
Qty: 36 G | Refills: 1 | Status: SHIPPED | OUTPATIENT
Start: 2022-11-17 | End: 2023-02-02

## 2022-11-17 RX ORDER — ESTRADIOL 2 MG/1
8 TABLET ORAL DAILY
Qty: 120 TABLET | Refills: 1 | Status: SHIPPED | OUTPATIENT
Start: 2022-11-17 | End: 2023-02-22

## 2022-11-17 RX ORDER — ESTRADIOL 2 MG/1
8 TABLET ORAL DAILY
Qty: 120 TABLET | Refills: 0 | Status: SHIPPED | OUTPATIENT
Start: 2022-11-17 | End: 2022-11-17

## 2022-11-17 NOTE — PROGRESS NOTES
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    Transgender estradiol 2 mg 1 p.o. daily at a therapeutic dose.  Encouraged tobacco cessation.  We will plan to repeat labs and follow-up in 3 months sooner if needed.                 Return in about 3 months (around 2/17/2023).    Kaylan Neville MD  [facility]    Verna Alvarez is a 47 year old accompanied by her partner, presenting for the following health issues:  RECHECK      History of Present Illness       Reason for visit:  Med check    She eats 0-1 servings of fruits and vegetables daily.She consumes 2 sweetened beverage(s) daily.She exercises with enough effort to increase her heart rate 9 or less minutes per day.  She exercises with enough effort to increase her heart rate 3 or less days per week. She is missing 1 dose(s) of medications per week.  She is not taking prescribed medications regularly due to remembering to take.             Review of Systems         Objective    /77 (BP Location: Right arm, Patient Position: Sitting)   Pulse 81   Resp 16   Wt 70.9 kg (156 lb 4.8 oz)   SpO2 99%   BMI 24.48 kg/m    Body mass index is 24.48 kg/m .  Physical Exam

## 2022-11-30 ENCOUNTER — PATIENT OUTREACH (OUTPATIENT)
Dept: PLASTIC SURGERY | Facility: CLINIC | Age: 47
End: 2022-11-30

## 2022-11-30 ENCOUNTER — DOCUMENTATION ONLY (OUTPATIENT)
Dept: PLASTIC SURGERY | Facility: CLINIC | Age: 47
End: 2022-11-30

## 2022-11-30 ENCOUNTER — LAB (OUTPATIENT)
Dept: LAB | Facility: CLINIC | Age: 47
End: 2022-11-30
Payer: COMMERCIAL

## 2022-11-30 DIAGNOSIS — Z87.891 PERSONAL HISTORY OF TOBACCO USE, PRESENTING HAZARDS TO HEALTH: ICD-10-CM

## 2022-11-30 PROCEDURE — 80323 ALKALOIDS NOS: CPT | Mod: 90

## 2022-11-30 PROCEDURE — 99000 SPECIMEN HANDLING OFFICE-LAB: CPT

## 2022-11-30 NOTE — PATIENT INSTRUCTIONS
Spoke to pt today regarding reschedule of appt today. Pt requests that lab order for urine cotinine testing be faxed to lab at [facility] at [place], [place]. Request completed and we will watch for results. Appt rescheduled to 12/14 with Dr Davenport. Gray DIAMOND RN

## 2022-11-30 NOTE — PROGRESS NOTES
[facility] :  Care Coordination Note     SITUATION   Kim Padilla is a 47 year old adult who is receiving support for:  Care Team  .    BACKGROUND     Reviewed LOS. They both meet criteria but are from 2021 so likely need to be updated to within a year of surgery.     ASSESSMENT     Surgery              CGC Assessment  Comprehensive Gender Care (CGC) Enrollment: Enrolled  Patient has a therapist: Yes  Name of therapist: stanley Watson  Letter of support #1: Received  Letter #1 Date: 08/03/21  Letter of support #2: Received  Letter #2 Date: 06/22/21  Surgery being considered: Yes  Augmentation: Yes  Vaginoplasty: Yes          PLAN          Nursing Interventions:       Follow-up plan:  Pt to upload updated LOS. She's working on quitting smoking and needs to get UA cleared before scheduling.        CRISTINO CRISTINA LPCC

## 2022-12-06 LAB
ANABASINE UR-MCNC: <5 NG/ML
COTININE UR-MCNC: <15 NG/ML
NICOTINE UR-MCNC: 16 NG/ML
TRANS-3-OH-COTININE UR-MCNC: <50 NG/ML

## 2022-12-14 ENCOUNTER — OFFICE VISIT (OUTPATIENT)
Dept: PLASTIC SURGERY | Facility: CLINIC | Age: 47
End: 2022-12-14
Payer: COMMERCIAL

## 2022-12-14 VITALS — SYSTOLIC BLOOD PRESSURE: 117 MMHG | DIASTOLIC BLOOD PRESSURE: 75 MMHG | HEART RATE: 89 BPM | OXYGEN SATURATION: 98 %

## 2022-12-14 DIAGNOSIS — F64.9 GENDER DYSPHORIA: Primary | ICD-10-CM

## 2022-12-14 PROCEDURE — 99214 OFFICE O/P EST MOD 30 MIN: CPT | Performed by: STUDENT IN AN ORGANIZED HEALTH CARE EDUCATION/TRAINING PROGRAM

## 2022-12-14 ASSESSMENT — PAIN SCALES - GENERAL: PAINLEVEL: MODERATE PAIN (5)

## 2022-12-14 NOTE — LETTER
12/14/2022       RE: Madi Padilla  [address]  [facility] [address]       Dear Colleague,    Thank you for referring your patient, Madi Padilla, to the [facility] [place] [facility]. Please see a copy of my visit note below.    PRS    Patient is quit smoking.  Negative urine cotinine screen.  Ready for surgery, except the letter of support needs to be reviewed and updated.     Discussed at-length both patient goals as well as risks of the operation which include but are not limited to: infection, bleeding, pain, poor scarring, wound healing issues, implant malposition, implant rupture, animation deformity, double-bubble deformity, capsular contracture, DARIAN-ALCL, numbness, asymmetry and need for revisional surgery. Despite these risks, patient consents to and would like to proceed with scheduling for BILATERAL feminizing breast augmentation with use of permanent silicone gel implants. Reviewed FDA implant safety checklist.      A total of 30 minutes was devoted to review of chart, direct face-to-face patient counseling and documentation during this encounter, exclusive of any procedure performed.           Again, thank you for allowing me to participate in the care of your patient.      Sincerely,    Osman Davenport MD

## 2022-12-14 NOTE — PROGRESS NOTES
PRS    Patient is quit smoking.  Negative urine cotinine screen.  Ready for surgery, except the letter of support needs to be reviewed and updated.     Discussed at-length both patient goals as well as risks of the operation which include but are not limited to: infection, bleeding, pain, poor scarring, wound healing issues, implant malposition, implant rupture, animation deformity, double-bubble deformity, capsular contracture, DARIAN-ALCL, numbness, asymmetry and need for revisional surgery. Despite these risks, patient consents to and would like to proceed with scheduling for BILATERAL feminizing breast augmentation with use of permanent silicone gel implants. Reviewed FDA implant safety checklist.      A total of 30 minutes was devoted to review of chart, direct face-to-face patient counseling and documentation during this encounter, exclusive of any procedure performed.     Osman Davenport MD, PhD

## 2022-12-14 NOTE — NURSING NOTE
Chief Complaint   Patient presents with     Consult     Breast augmentation        Vitals:    12/14/22 1325   BP: 117/75   Pulse: 89   SpO2: 98%       There is no height or weight on file to calculate BMI.          DUY ANTOINE EMT

## 2022-12-20 ENCOUNTER — MEDICAL CORRESPONDENCE (OUTPATIENT)
Dept: HEALTH INFORMATION MANAGEMENT | Facility: CLINIC | Age: 47
End: 2022-12-20

## 2023-01-05 ENCOUNTER — TELEPHONE (OUTPATIENT)
Dept: FAMILY MEDICINE | Facility: CLINIC | Age: 48
End: 2023-01-05

## 2023-01-05 ENCOUNTER — TRANSFERRED RECORDS (OUTPATIENT)
Dept: HEALTH INFORMATION MANAGEMENT | Facility: CLINIC | Age: 48
End: 2023-01-05
Payer: COMMERCIAL

## 2023-01-05 ENCOUNTER — TELEPHONE (OUTPATIENT)
Dept: PLASTIC SURGERY | Facility: CLINIC | Age: 48
End: 2023-01-05

## 2023-01-05 DIAGNOSIS — Z12.31 ENCOUNTER FOR SCREENING MAMMOGRAM FOR BREAST CANCER: Primary | ICD-10-CM

## 2023-01-05 DIAGNOSIS — F64.0 TRANSSEXUALISM: ICD-10-CM

## 2023-01-05 DIAGNOSIS — Z79.899 CURRENT USE OF ESTROGEN THERAPY: ICD-10-CM

## 2023-01-05 PROBLEM — Z79.818 CURRENT USE OF ESTROGEN THERAPY: Status: ACTIVE | Noted: 2023-01-05

## 2023-01-05 NOTE — TELEPHONE ENCOUNTER
"  General Call    Contacts       Type Contact Phone/Fax    01/05/2023 10:10 AM CST Phone (Incoming) Saba @ [facility] (Other) [phone]     fax [phone]        Reason for Call:  Referral from 01/12/2022 is showing \"pending review\".     What are your questions or concerns: Saba @ [facility] is requesting the referral from 01/12/2022 for a breast mammogram.  The Referral states \"pending review\". Is there anyway we can get this referral \"approved\" or a new referral put in? Pt is transgender and takes hormone replacements and that is there reason for the mammogram. Please fax the referral to [phone] as soon as possible.      Date of last appointment with provider: 11/17/2022    Please call Saba at [facility] with any questions/concerns.     Margret Mariano    "

## 2023-01-06 NOTE — TELEPHONE ENCOUNTER
Attempted to call patient on 1/5 regarding scheduling surgery with Dr. Davenport. Writer is also coordinating surgery for their partner, and left a detailed voicemail on their line.     __    Kayla Read, Senior Perioperative Coordinator, on 1/5/2023 at 6:07 PM  P: [phone]

## 2023-01-09 PROBLEM — F64.9 GENDER DYSPHORIA: Status: ACTIVE | Noted: 2023-01-09

## 2023-01-09 NOTE — TELEPHONE ENCOUNTER
Received voicemail from patient on 1/6 and 1/9 regarding scheduling surgery with Dr. Davenport.    RN Care Coordinator: Gray Sorenson; [phone]    Surgery is scheduled with Dr. Davenport   Date: 2/27   [facility]  Scheduled per: next available date    H&P to be completed by Primary Care team; patient to schedule    COVID test: Patient to take an at-home COVID-19 test 1-2 days before their surgery date. Patient to take a picture of the result and either upload this to [email or web address], or show the surgeon the picture during pre-op.     Surgical consult: Declined - patient stated that Dr. Davenport noted all pre-surgical appointments can be done in [place].    Post-op visit(s): 3/15    Patient will receive a phone call from pre-admission nurses 1-2 days prior to surgery with arrival and start time. Planning to arrive at 5:45 AM for surgery at 7:15 AM.      Spoke with the patient and was able to confirm all scheduled information.       Patient questions/concerns: Kim and Sonya (partner) are both having surgery with Dr. Davenport on 2/27. Kim is aware that after surgery, since going first, will have to wait in the waiting room until Sonya is ready to leave. Kim does have questions about the surgical bra and if this is provided. Will have RNCC reach out. Writer confirmed that they need someone to drive them and stay for 24 hours after surgery.     is oN (last name unknown per Kim)  No works at [employer] in [place]  No's number is [phone]      Surgery packet to be sent via US mail and via [email or web address]    __    Kayla Read, Senior Perioperative Coordinator, on 1/9/2023 at 1:52 PM  P: [phone]

## 2023-01-19 ENCOUNTER — PATIENT OUTREACH (OUTPATIENT)
Dept: PLASTIC SURGERY | Facility: CLINIC | Age: 48
End: 2023-01-19
Payer: COMMERCIAL

## 2023-01-19 NOTE — PATIENT INSTRUCTIONS
"Pre Op Teaching Flowsheet                                        Pre and Post op Patient Education  Diagnosis: Gender Dysphoria  Teaching Pre and post op for Breast Augmentation  Person Involved in teaching: Patient      Motivation Level: High  Asks Questions: Yes  Eager to Learn:  Yes  Cooperative: Yes  Receptive (willing/able to accept information):  Yes    Patient demonstrates understanding of the following:  Date of surgery: 02/27/23  Location of surgery: [facility]  Pre operative History/Physical other testing prior to surgery: PCP on 02/01/23  No more than 30 days prior to surgery date.   Medications to take the day of surgery: PCP   Blood thinner medications discussed and when to stop (if applicable): PCP   Diabetes medication management (if applicable): PCP    Patient demonstrates understanding of the following:  Patient informed and verbalizes understanding of the need for a responsible adult  and someone to stay with them for the first 24 hours post-operatively: Yes,  will be \"No\". She will stay in waiting room with pt while her partner has surgery as well. No will also be staying with pt for 24 hours after surgery.   NPO per Anesthesia Guidelines : Reviewed  Pre-op showering/scrub information with Hibiclens Soap: Yes    Discussed   Pain Management after surgery: pain scale, pain medications techniques  Infection Prevention  Patient instructed on hand hygiene  Surgical procedure site care taught  Signs and symptoms of infection taught  Wound care will be taught at the time of discharge.    Pre op Covid testing on: At home per surgery scheduler instructions  Post-op follow-up: 03/15/23 with Dr Davenport  Discussed how to contact the hospital, nurse, and clinic scheduling staff if necessary.     Surgical instructions given to patient in via phone. Packet sent via [facility] and will be mailed as well.  Instructional materials: Before your surgery packet.    Total time with patient: 15 " lana DIAMOND RN BSN

## 2023-01-24 ENCOUNTER — DOCUMENTATION ONLY (OUTPATIENT)
Dept: PLASTIC SURGERY | Facility: CLINIC | Age: 48
End: 2023-01-24

## 2023-01-31 ENCOUNTER — OFFICE VISIT (OUTPATIENT)
Dept: FAMILY MEDICINE | Facility: CLINIC | Age: 48
End: 2023-01-31
Payer: COMMERCIAL

## 2023-01-31 VITALS
RESPIRATION RATE: 16 BRPM | BODY MASS INDEX: 24.1 KG/M2 | WEIGHT: 159 LBS | TEMPERATURE: 98.1 F | OXYGEN SATURATION: 97 % | SYSTOLIC BLOOD PRESSURE: 100 MMHG | HEIGHT: 68 IN | HEART RATE: 69 BPM | DIASTOLIC BLOOD PRESSURE: 64 MMHG

## 2023-01-31 DIAGNOSIS — Z11.4 SCREENING FOR HIV (HUMAN IMMUNODEFICIENCY VIRUS): ICD-10-CM

## 2023-01-31 DIAGNOSIS — Z12.11 SPECIAL SCREENING FOR MALIGNANT NEOPLASMS, COLON: ICD-10-CM

## 2023-01-31 DIAGNOSIS — Z71.89 ACP (ADVANCE CARE PLANNING): ICD-10-CM

## 2023-01-31 DIAGNOSIS — Z87.09 HISTORY OF RESPIRATORY FAILURE: ICD-10-CM

## 2023-01-31 DIAGNOSIS — Z23 NEED FOR VACCINATION: ICD-10-CM

## 2023-01-31 DIAGNOSIS — K08.9 POOR DENTITION: ICD-10-CM

## 2023-01-31 DIAGNOSIS — Z11.59 NEED FOR HEPATITIS C SCREENING TEST: ICD-10-CM

## 2023-01-31 DIAGNOSIS — Z13.220 LIPID SCREENING: ICD-10-CM

## 2023-01-31 DIAGNOSIS — Z12.11 SCREEN FOR COLON CANCER: ICD-10-CM

## 2023-01-31 DIAGNOSIS — Z78.9 MALE-TO-FEMALE TRANSGENDER PERSON: ICD-10-CM

## 2023-01-31 DIAGNOSIS — F64.0 TRANSSEXUALISM: ICD-10-CM

## 2023-01-31 DIAGNOSIS — Z01.818 PREOP GENERAL PHYSICAL EXAM: Primary | ICD-10-CM

## 2023-01-31 PROBLEM — Z90.79 HISTORY OF ORCHIECTOMY: Status: ACTIVE | Noted: 2023-01-31

## 2023-01-31 LAB
ANION GAP SERPL CALCULATED.3IONS-SCNC: 10 MMOL/L (ref 7–15)
BUN SERPL-MCNC: 8.4 MG/DL (ref 6–20)
CALCIUM SERPL-MCNC: 9.5 MG/DL (ref 8.6–10)
CHLORIDE SERPL-SCNC: 102 MMOL/L (ref 98–107)
CHOLEST SERPL-MCNC: 188 MG/DL
CREAT SERPL-MCNC: 0.83 MG/DL (ref 0.51–1.17)
DEPRECATED HCO3 PLAS-SCNC: 28 MMOL/L (ref 22–29)
ERYTHROCYTE [DISTWIDTH] IN BLOOD BY AUTOMATED COUNT: 13 % (ref 10–15)
ESTRADIOL SERPL-MCNC: 66 PG/ML
FEF 25/75: NORMAL
FEV-1: NORMAL
FEV1/FVC: NORMAL
FVC: NORMAL
GFR SERPL CREATININE-BSD FRML MDRD: >90 ML/MIN/1.73M2
GLUCOSE SERPL-MCNC: 102 MG/DL (ref 70–99)
HCT VFR BLD AUTO: 41.1 % (ref 35–53)
HDLC SERPL-MCNC: 53 MG/DL
HGB BLD-MCNC: 13.3 G/DL (ref 11.7–17.7)
LDLC SERPL CALC-MCNC: 118 MG/DL
MCH RBC QN AUTO: 30.7 PG (ref 26.5–33)
MCHC RBC AUTO-ENTMCNC: 32.4 G/DL (ref 31.5–36.5)
MCV RBC AUTO: 95 FL (ref 78–100)
NONHDLC SERPL-MCNC: 135 MG/DL
PLATELET # BLD AUTO: 216 10E3/UL (ref 150–450)
POTASSIUM SERPL-SCNC: 4.8 MMOL/L (ref 3.4–5.3)
RBC # BLD AUTO: 4.33 10E6/UL (ref 3.8–5.9)
SODIUM SERPL-SCNC: 140 MMOL/L (ref 136–145)
TRIGL SERPL-MCNC: 86 MG/DL
WBC # BLD AUTO: 12.2 10E3/UL (ref 4–11)

## 2023-01-31 PROCEDURE — 36415 COLL VENOUS BLD VENIPUNCTURE: CPT | Performed by: FAMILY MEDICINE

## 2023-01-31 PROCEDURE — 90471 IMMUNIZATION ADMIN: CPT | Mod: SL | Performed by: FAMILY MEDICINE

## 2023-01-31 PROCEDURE — 85027 COMPLETE CBC AUTOMATED: CPT | Performed by: FAMILY MEDICINE

## 2023-01-31 PROCEDURE — 80061 LIPID PANEL: CPT | Performed by: FAMILY MEDICINE

## 2023-01-31 PROCEDURE — 90715 TDAP VACCINE 7 YRS/> IM: CPT | Mod: SL | Performed by: FAMILY MEDICINE

## 2023-01-31 PROCEDURE — 90472 IMMUNIZATION ADMIN EACH ADD: CPT | Mod: SL | Performed by: FAMILY MEDICINE

## 2023-01-31 PROCEDURE — 87389 HIV-1 AG W/HIV-1&-2 AB AG IA: CPT | Performed by: FAMILY MEDICINE

## 2023-01-31 PROCEDURE — 80048 BASIC METABOLIC PNL TOTAL CA: CPT | Performed by: FAMILY MEDICINE

## 2023-01-31 PROCEDURE — 82670 ASSAY OF TOTAL ESTRADIOL: CPT | Performed by: FAMILY MEDICINE

## 2023-01-31 PROCEDURE — 94010 BREATHING CAPACITY TEST: CPT | Performed by: FAMILY MEDICINE

## 2023-01-31 PROCEDURE — 90677 PCV20 VACCINE IM: CPT | Mod: SL | Performed by: FAMILY MEDICINE

## 2023-01-31 PROCEDURE — 99215 OFFICE O/P EST HI 40 MIN: CPT | Mod: 25 | Performed by: FAMILY MEDICINE

## 2023-01-31 PROCEDURE — 86803 HEPATITIS C AB TEST: CPT | Performed by: FAMILY MEDICINE

## 2023-01-31 PROCEDURE — 93000 ELECTROCARDIOGRAM COMPLETE: CPT | Performed by: FAMILY MEDICINE

## 2023-01-31 ASSESSMENT — PATIENT HEALTH QUESTIONNAIRE - PHQ9
SUM OF ALL RESPONSES TO PHQ QUESTIONS 1-9: 5
SUM OF ALL RESPONSES TO PHQ QUESTIONS 1-9: 5
10. IF YOU CHECKED OFF ANY PROBLEMS, HOW DIFFICULT HAVE THESE PROBLEMS MADE IT FOR YOU TO DO YOUR WORK, TAKE CARE OF THINGS AT HOME, OR GET ALONG WITH OTHER PEOPLE: NOT DIFFICULT AT ALL
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
## 2023-01-31 NOTE — PATIENT INSTRUCTIONS
For informational purposes only. Not to replace the advice of your health care provider. Copyright   2003,  [employer] [facility]. All rights reserved. Clinically reviewed by Georgina Chisholm MD. [employer] [id number] - REV .  Preparing for Your Surgery  Getting started  A nurse will call you to review your health history and instructions. They will give you an arrival time based on your scheduled surgery time. Please be ready to share:    Your doctor's clinic name and phone number    Your medical, surgical, and anesthesia history    A list of allergies and sensitivities    A list of medicines, including herbal treatments and over-the-counter drugs    Whether the patient has a legal guardian (ask how to send us the papers in advance)  Please tell us if you're pregnant--or if there's any chance you might be pregnant. Some surgeries may injure a fetus (unborn baby), so they require a pregnancy test. Surgeries that are safe for a fetus don't always need a test, and you can choose whether to have one.   If you have a child who's having surgery, please ask for a copy of Preparing for Your Child's Surgery.    Preparing for surgery    Within 10 to 30 days of surgery: Have a pre-op exam (sometimes called an H&P, or History and Physical). This can be done at a clinic or pre-operative center.  ? If you're having a , you may not need this exam. Talk to your care team.    At your pre-op exam, talk to your care team about all medicines you take. If you need to stop any medicines before surgery, ask when to start taking them again.  ? We do this for your safety. Many medicines can make you bleed too much during surgery. Some change how well surgery (anesthesia) drugs work.    Call your insurance company to let them know you're having surgery. (If you don't have insurance, call [phone].)    Call your clinic if there's any change in your health. This includes signs of a cold or flu (sore throat, runny nose,  cough, rash, fever). It also includes a scrape or scratch near the surgery site.    If you have questions on the day of surgery, call your hospital or surgery center.  Eating and drinking guidelines  For your safety: Unless your surgeon tells you otherwise, follow the guidelines below.    Eat and drink as usual until 8 hours before you arrive for surgery. After that, no food or milk.    Drink clear liquids until 2 hours before you arrive. These are liquids you can see through, like water, Gatorade, and Propel Water. They also include plain black coffee and tea (no cream or milk), candy, and breath mints. You can spit out gum when you arrive.    If you drink alcohol: Stop drinking it the night before surgery.    If your care team tells you to take medicine on the morning of surgery, it's okay to take it with a sip of water.  Preventing infection    Shower or bathe the night before and morning of your surgery. Follow the instructions your clinic gave you. (If no instructions, use regular soap.)    Don't shave or clip hair near your surgery site. We'll remove the hair if needed.    Don't smoke or vape the morning of surgery. You may chew nicotine gum up to 2 hours before surgery. A nicotine patch is okay.  ? Note: Some surgeries require you to completely quit smoking and nicotine. Check with your surgeon.    Your care team will make every effort to keep you safe from infection. We will:  ? Clean our hands often with soap and water (or an alcohol-based hand rub).  ? Clean the skin at your surgery site with a special soap that kills germs.  ? Give you a special gown to keep you warm. (Cold raises the risk of infection.)  ? Wear special hair covers, masks, gowns and gloves during surgery.  ? Give antibiotic medicine, if prescribed. Not all surgeries need antibiotics.  What to bring on the day of surgery    Photo ID and insurance card    Copy of your health care directive, if you have one    Glasses and hearing aids (bring  cases)  ? You can't wear contacts during surgery    Inhaler and eye drops, if you use them (tell us about these when you arrive)    CPAP machine or breathing device, if you use them    A few personal items, if spending the night    If you have . . .  ? A pacemaker, ICD (cardiac defibrillator) or other implant: Bring the ID card.  ? An implanted stimulator: Bring the remote control.  ? A legal guardian: Bring a copy of the certified (court-stamped) guardianship papers.  Please remove any jewelry, including body piercings. Leave jewelry and other valuables at home.  If you're going home the day of surgery    You must have a responsible adult drive you home. They should stay with you overnight as well.    If you don't have someone to stay with you, and you aren't safe to go home alone, we may keep you overnight. Insurance often won't pay for this.  After surgery  If it's hard to control your pain or you need more pain medicine, please call your surgeon's office.  Questions?   If you have any questions for your care team, list them here: _________________________________________________________________________________________________________________________________________________________________________ ____________________________________ ____________________________________ ____________________________________

## 2023-02-01 ENCOUNTER — OFFICE VISIT (OUTPATIENT)
Dept: FAMILY MEDICINE | Facility: CLINIC | Age: 48
End: 2023-02-01

## 2023-02-01 ENCOUNTER — OFFICE VISIT (OUTPATIENT)
Dept: FAMILY MEDICINE | Facility: CLINIC | Age: 48
End: 2023-02-01
Payer: COMMERCIAL

## 2023-02-01 ENCOUNTER — ANCILLARY PROCEDURE (OUTPATIENT)
Dept: CARDIOLOGY | Facility: CLINIC | Age: 48
End: 2023-02-01
Attending: FAMILY MEDICINE
Payer: COMMERCIAL

## 2023-02-01 VITALS
SYSTOLIC BLOOD PRESSURE: 108 MMHG | DIASTOLIC BLOOD PRESSURE: 70 MMHG | TEMPERATURE: 98.3 F | RESPIRATION RATE: 16 BRPM | BODY MASS INDEX: 24.1 KG/M2 | HEIGHT: 68 IN | WEIGHT: 159 LBS | OXYGEN SATURATION: 97 %

## 2023-02-01 DIAGNOSIS — R55 SYNCOPE, UNSPECIFIED SYNCOPE TYPE: Primary | ICD-10-CM

## 2023-02-01 DIAGNOSIS — J42 CHRONIC BRONCHITIS, UNSPECIFIED CHRONIC BRONCHITIS TYPE (H): ICD-10-CM

## 2023-02-01 DIAGNOSIS — R00.1 BRADYCARDIA: ICD-10-CM

## 2023-02-01 DIAGNOSIS — R55 SYNCOPE, UNSPECIFIED SYNCOPE TYPE: ICD-10-CM

## 2023-02-01 LAB
HCV AB SERPL QL IA: NONREACTIVE
HIV 1+2 AB+HIV1 P24 AG SERPL QL IA: NONREACTIVE

## 2023-02-01 PROCEDURE — 99214 OFFICE O/P EST MOD 30 MIN: CPT | Performed by: FAMILY MEDICINE

## 2023-02-01 NOTE — PROGRESS NOTES
Assessment & Plan   Problem List Items Addressed This Visit    None  Visit Diagnoses     Syncope, unspecified syncope type    -  Primary    Chronic bronchitis, unspecified chronic bronchitis type (H)        Relevant Medications    albuterol (PROAIR HFA/PROVENTIL HFA/VENTOLIN HFA) 108 (90 Base) MCG/ACT inhaler      Patient presented to clinic today for with her girlfriend.  As we were reviewing patient's results from testing done yesterday patient shared that at least once a day she has a syncopal episode.  Her partner witnesses these episodes and notes the patient's eyes seem to roll to the back of the head and patient just collapsed.  Few minutes later they wake up and seemed to have no altered mental status.  Patient was added onto my schedule today for additional evaluation.  She has surgery planned for the end of the month.  We reviewed yesterday's labs.  Also please see additional encounter from today  Under which an EKG was ordered.      Patient CBC reveals an elevated white blood cell count.  Patient is not feeling ill at all.  No chest pain no cough aside from her baseline cough, no nausea or dysuria.    We also discussed patient spirometry which shows COPD with gold stage II.  Patient inquired about whether or Spence to start a new inhaler for them.  I reeducated her that I would like her to use her Dulera 2 puffs twice daily instead of just 1 puff once a day.    Elevated white blood cell count would like us to recheck that in a month.  No evidence of an acute process at this time suggesting illness.    Syncope EKG today.  We will get Holter study and echo and referred patient to cardiology.    Spoke with nursing staff today and they do have a Holter available to send patient home with today.                   No follow-ups on file.    Kaylan Neville MD  [employer]    Verna Alvarez is a 47 year old, presenting for the following health issues:  Syncope (Pt c/o  "fainting every morning x years. )      HPI           Review of Systems         Objective    /70 (BP Location: Right arm, Patient Position: Sitting)   Temp 98.3  F (36.8  C)   Resp 16   Ht 1.727 m (5' 8\")   Wt 72.1 kg (159 lb)   SpO2 97%   BMI 24.18 kg/m    Body mass index is 24.18 kg/m .  Physical Exam                       "

## 2023-02-02 RX ORDER — ALBUTEROL SULFATE 90 UG/1
2 AEROSOL, METERED RESPIRATORY (INHALATION) EVERY 4 HOURS PRN
Qty: 36 G | Refills: 1 | Status: SHIPPED | OUTPATIENT
Start: 2023-02-02 | End: 2023-05-03

## 2023-02-02 NOTE — PROGRESS NOTES
This is an additional encounter to the encounter from the office visit today please see note related to that encounter for additional information.

## 2023-02-02 NOTE — RESULT ENCOUNTER NOTE
Benoit Alvarez,    As we discussed in clinic today your CBC showed a slightly elevated white blood cell count.  I think we should check it again next month to see how it is trending.  Your basic metabolic panelIf I recall correctly you were not fasting when we had this done so this would be a normal blood sugar.  Your cholesterol is too high.  We can talk more about this the next time you come in.  HIV and Hepatitis C are negative.    TOMI

## 2023-02-04 ENCOUNTER — LAB (OUTPATIENT)
Dept: LAB | Facility: CLINIC | Age: 48
End: 2023-02-04
Payer: COMMERCIAL

## 2023-02-04 DIAGNOSIS — Z12.11 SCREEN FOR COLON CANCER: ICD-10-CM

## 2023-02-04 LAB — HEMOCCULT STL QL IA: NEGATIVE

## 2023-02-04 PROCEDURE — 82274 ASSAY TEST FOR BLOOD FECAL: CPT

## 2023-02-08 ENCOUNTER — OFFICE VISIT (OUTPATIENT)
Dept: FAMILY MEDICINE | Facility: CLINIC | Age: 48
End: 2023-02-08
Payer: COMMERCIAL

## 2023-02-08 VITALS
HEART RATE: 94 BPM | DIASTOLIC BLOOD PRESSURE: 64 MMHG | WEIGHT: 158 LBS | HEIGHT: 68 IN | RESPIRATION RATE: 16 BRPM | BODY MASS INDEX: 23.95 KG/M2 | OXYGEN SATURATION: 96 % | SYSTOLIC BLOOD PRESSURE: 110 MMHG

## 2023-02-08 DIAGNOSIS — J42 CHRONIC BRONCHITIS, UNSPECIFIED CHRONIC BRONCHITIS TYPE (H): ICD-10-CM

## 2023-02-08 DIAGNOSIS — R55 SYNCOPE, UNSPECIFIED SYNCOPE TYPE: Primary | ICD-10-CM

## 2023-02-08 PROCEDURE — 99214 OFFICE O/P EST MOD 30 MIN: CPT | Performed by: FAMILY MEDICINE

## 2023-02-08 NOTE — PROGRESS NOTES
"  Assessment & Plan   Problem List Items Addressed This Visit    None  Visit Diagnoses     Syncope, unspecified syncope type    -  Primary    Chronic bronchitis, unspecified chronic bronchitis type (H)             COPD patient is starting using the Dulera twice daily.  Reports that they have noticed some improvement in her shortness of breath.    Syncope patient has completed the Holter study however results are unavailable for us to review today.  She did have some syncope while on the Holter study.  She has an appointment next week for her echocardiogram and then an appointment with cardiology for review.  We will see what cardiology has to say about the results that they reviewed together and whether or not patient will be an appropriate surgical candidate for upcoming Surgery that is planned.               No follow-ups on file.    Kaylan Neville MD  [facility]    Verna Alvarez is a 47 year old, presenting for the following health issues:  RECHECK      History of Present Illness       Reason for visit:  Meds    She eats 0-1 servings of fruits and vegetables daily.She consumes 1 sweetened beverage(s) daily.She exercises with enough effort to increase her heart rate 9 or less minutes per day.  She exercises with enough effort to increase her heart rate 3 or less days per week.   She is taking medications regularly.             Review of Systems         Objective    /64 (BP Location: Right arm, Patient Position: Sitting)   Pulse 94   Resp 16   Ht 1.727 m (5' 8\")   Wt 71.7 kg (158 lb)   SpO2 96%   BMI 24.02 kg/m    Body mass index is 24.02 kg/m .  Physical Exam                       "

## 2023-02-13 PROCEDURE — 93224 XTRNL ECG REC UP TO 48 HRS: CPT | Performed by: INTERNAL MEDICINE

## 2023-02-13 NOTE — RESULT ENCOUNTER NOTE
Good news, your Holter study showed normal heart activity even during the episodes where you felt lightheaded or fainted.  Please make sure you keep the appointment with the cardiologist.

## 2023-02-14 ENCOUNTER — HOSPITAL ENCOUNTER (OUTPATIENT)
Dept: CARDIOLOGY | Facility: CLINIC | Age: 48
Discharge: HOME | End: 2023-02-14
Attending: FAMILY MEDICINE | Admitting: FAMILY MEDICINE
Payer: COMMERCIAL

## 2023-02-14 DIAGNOSIS — R00.1 BRADYCARDIA: ICD-10-CM

## 2023-02-14 DIAGNOSIS — R55 SYNCOPE, UNSPECIFIED SYNCOPE TYPE: ICD-10-CM

## 2023-02-14 LAB — LVEF ECHO: NORMAL

## 2023-02-14 PROCEDURE — 93306 TTE W/DOPPLER COMPLETE: CPT

## 2023-02-14 PROCEDURE — 93306 TTE W/DOPPLER COMPLETE: CPT | Mod: 26 | Performed by: INTERNAL MEDICINE

## 2023-02-21 DIAGNOSIS — J45.30 MILD PERSISTENT ASTHMA WITHOUT COMPLICATION: ICD-10-CM

## 2023-02-21 DIAGNOSIS — F64.0 TRANSSEXUALISM: ICD-10-CM

## 2023-02-22 RX ORDER — ESTRADIOL 2 MG/1
TABLET ORAL
Qty: 120 TABLET | Refills: 1 | Status: SHIPPED | OUTPATIENT
Start: 2023-02-22 | End: 2023-05-03

## 2023-02-22 NOTE — TELEPHONE ENCOUNTER
Routing to provider as refill request for review/approval because:  No recent ACT    LOV 2/8/23    Ella Luna RN  [facility]

## 2023-02-23 ENCOUNTER — OFFICE VISIT (OUTPATIENT)
Dept: CARDIOLOGY | Facility: CLINIC | Age: 48
End: 2023-02-23
Payer: COMMERCIAL

## 2023-02-23 VITALS
DIASTOLIC BLOOD PRESSURE: 60 MMHG | WEIGHT: 159.5 LBS | SYSTOLIC BLOOD PRESSURE: 108 MMHG | HEART RATE: 80 BPM | BODY MASS INDEX: 25.03 KG/M2 | RESPIRATION RATE: 16 BRPM | HEIGHT: 67 IN

## 2023-02-23 DIAGNOSIS — R55 SYNCOPE, UNSPECIFIED SYNCOPE TYPE: Primary | ICD-10-CM

## 2023-02-23 DIAGNOSIS — Z01.810 PRE-OPERATIVE CARDIOVASCULAR EXAMINATION: ICD-10-CM

## 2023-02-23 DIAGNOSIS — Z87.891 FORMER SMOKER: ICD-10-CM

## 2023-02-23 PROCEDURE — 99244 OFF/OP CNSLTJ NEW/EST MOD 40: CPT | Performed by: GENERAL ACUTE CARE HOSPITAL

## 2023-02-23 NOTE — LETTER
2/23/2023    Kaylan Neville MD  [address] [facility] [address]    RE: Madi Padilla       Dear Colleague,     I had the pleasure of seeing Madi Padilla in the [facility].  HEART CARE ENCOUNTER NOTE      Thank you, Dr. Neville, Kaylan MENJIVAR, for asking the [facility] team to see Ms. Madi Padilla to evaluate syncope.      Assessment/Recommendations   Assessment:    1. Dizziness and syncope. Unclear etiology but possibly related to anxiety. Normal resting electrocardiogram, ambulatory cardiac monitor, and transthoracic echocardiogram making a cardiac etiology unlikely.  2. Preoperative cardiovascular examination prior to breast surgery scheduled for 2/27/2022. Patient is at a low risk of perioperative major adverse cardiac events.  3. Former smoker.    Plan:  1. No additional cardiac testing or treatment is needed. Hydration could be helpful.  2. Okay to proceed with planned surgery from a cardiac standpoint.  3. Follow-up as needed.         History of Present Illness   Ms. Madi Padilla is a 47 year old adult with a significant past history of tobacco use presenting for evaluation of dizziness and syncope. They have had this issue their entire life, maybe happening more frequently now. It seems more likely to happen in the morning although it is unpredictable when it happens. Loss of consciousness is brief. No chest pain, shortness of breath, palpitations, lower extremity edema, orthopnea, or paroxysmal nocturnal dyspnea. Able to do a lot of walking and shoveling snow without any difficulty. Has upcoming breast surgery scheduled for 2/27/2022. They do endorse a lot of anxiety and drinking 1-2 pots of coffee per day.       Cardiac Problems and Cardiac Diagnostics     Most Recent Cardiac testing:  ECG dated 1/31/2023 (personaly reviewed and interpreted): sinus bradycardia HR 58 bpm, otherwise normal    ECHO 2/14/2023 (report reviewed):   Left  "ventricular size, wall motion and function are normal. The ejection fraction is 60-65%.  Normal right ventricle size and systolic function.  Both atria are normal in size.  No significant valve disease is identified.  There is no comparison study available.    Holter monitor 2/1/2023 (report reviewed):  STUDY INDICATION: Syncope, bradycardia   PREDOMINANT RHYTHM: Normal sinus rhythm, with normal heart rate response.   CARDIAC CONDUCTION: ND interval, QRS duration, QT interval all appeared to be normal. No significant bradycardia/pauses.   ATRIAL ARRHYTHMIA: Rare atrial ectopy. No nonsustained ectopic atrial tachycardia. No sustained ectopic atrial tachycardia, atrial fibrillation, or other SVT.   VENTRICULAR ARRHYTHMIA: Rare ventricular ectopy. No complex ventricular ectopy or sustained ventricular tachyarrhythmia.   SYMPTOMATIC RECORDINGS: Patient did report cardiac symptoms including \"fainting\", \"lost feeling\", and \"numbness\" on 2 occasions. Patient's rhythm demonstrated sinus tachycardia with rates ranging between 105 and 122 bpm. No ectopy or other rhythm disturbance was demonstrated. Essentially normal   IMPRESSION: Essentially normal 48-hour Holter monitor. The patient's symptomatic recordings did not correlate with any arrhythmia. The patient demonstrated no evidence of significant bradycardia or pauses.       Medications  Allergies   Current Outpatient Medications   Medication Sig Dispense Refill     albuterol (PROAIR HFA/PROVENTIL HFA/VENTOLIN HFA) 108 (90 Base) MCG/ACT inhaler Inhale 2 puffs into the lungs every 4 hours as needed for shortness of breath or wheezing Use with spacer 36 g 1     DULERA 100-5 MCG/ACT inhaler Inhale 2 puffs into the lungs 2 times daily 39 g 3     estradiol (ESTRACE) 2 MG tablet TAKE FOUR TABLETS (8 MG) BY MOUTH DAILY 120 tablet 1     nicotine (NICORETTE) 4 MG lozenge Place 1 lozenge (4 mg) inside cheek every hour as needed for smoking cessation Max 5 lozenge/6 hours and 20 " "lozenges/24 hours 600 lozenge 1     QUEtiapine (SEROQUEL) 25 MG tablet Take 1 tablet (25 mg) by mouth 2 times daily 180 tablet 1     Spacer/Aero-Holding Chambers (SPACER/AERO-HOLD CHAMBER MASK) CONSUELO 1 each every 4 hours as needed (use of albuterol) 1 each 0     tiotropium (SPIRIVA) 18 MCG inhaled capsule Inhale 1 capsule (18 mcg) into the lungs daily 90 capsule 1      Allergies   Allergen Reactions     Penicillins Swelling     Seafood Angioedema        Physical Examination Review of Systems   /60 (BP Location: Left arm, Patient Position: Sitting, Cuff Size: Adult Regular)   Pulse 80   Resp 16   Ht 1.702 m (5' 7\")   Wt 72.3 kg (159 lb 8 oz)   BMI 24.98 kg/m    Body mass index is 24.98 kg/m .  Wt Readings from Last 3 Encounters:   02/23/23 72.3 kg (159 lb 8 oz)   02/08/23 71.7 kg (158 lb)   02/01/23 72.1 kg (159 lb)       General Appearance:   Pleasant  adult, appears  stated age. no acute distress, normal body habitus   ENT/Mouth: Facemask.     EYES:  no scleral icterus, normal conjunctivae   Neck: no carotid bruits. No anterior cervical lymphadenopaty   Respiratory:   lungs are clear to auscultation, no rales or wheezing, equal chest wall expansion    Cardiovascular:   Regular rhythm, normal rate. Normal first and second heart sounds with no murmurs, rubs, or gallops; the carotid, radial and posterior tibial pulses are intact, Jugular venous pressure normal, no edema bilaterally    Abdomen/GI:  no organomegaly, masses, bruits, or tenderness; bowel sounds are present   Extremities: no cyanosis or clubbing   Skin: no xanthelasma, warm.    Heme/lymph/ Immunology No apparent bleeding noted.   Neurologic: Alert and oriented. normal gait, no tremors     Psychiatric: Pleasant, calm, appropriate affect.    A complete 10 system review of systems was performed and is negative except as mentioned in the HPI/subjective.         Past History   Past Medical History:   Past Medical History:   Diagnosis Date     COPD " without exacerbation (H)        Past Surgical History:   Past Surgical History:   Procedure Laterality Date     EYE MUSCLE SURGERY       ORCHIECTOMY SCROTAL BILATERAL       TOOTH EXTRACTION       VASECTOMY         Family History:   Family History   Problem Relation Age of Onset     No Known Problems Mother      No Known Problems Father      Cancer Sister         intestinal cancer     No Known Problems Brother      Coronary Artery Disease Maternal Grandmother      Cancer Maternal Aunt         throat        Social History:   Social History     Socioeconomic History     Marital status: Single     Spouse name: Not on file     Number of children: Not on file     Years of education: Not on file     Highest education level: Not on file   Occupational History     Not on file   Tobacco Use     Smoking status: Former     Packs/day: 0.50     Years: 10.00     Pack years: 5.00     Types: Cigarettes     Start date: 2/3/1980     Quit date: 2022     Years since quittin.0     Smokeless tobacco: Never   Substance and Sexual Activity     Alcohol use: Not Currently     Drug use: Never     Sexual activity: Yes     Partners: Male     Birth control/protection: None   Other Topics Concern     Parent/sibling w/ CABG, MI or angioplasty before 65F 55M? No   Social History Narrative     Not on file     Social Determinants of Health     Financial Resource Strain: Not on file   Food Insecurity: Not on file   Transportation Needs: Not on file   Physical Activity: Not on file   Stress: Not on file   Social Connections: Not on file   Intimate Partner Violence: Not on file   Housing Stability: Not on file              Lab Results    Chemistry/lipid CBC    Lab Results   Component Value Date    CHOL 188 2023    HDL 53 2023     (H) 2023    TRIG 86 2023    CR 0.83 2023    BUN 8.4 2023    POTASSIUM 4.8 2023     2023    CO2 28 2023      Lab Results   Component Value Date    WBC  12.2 (H) 01/31/2023    HGB 13.3 01/31/2023    HCT 41.1 01/31/2023    MCV 95 01/31/2023     01/31/2023             Wagner Call MD [facility]  Non-Invasive Cardiologist  [facility]  Pager [phone]        Thank you for allowing me to participate in the care of your patient.      Sincerely,     Wagner Call MD     [facility]  cc:   Kaylan Neville MD  [address] [place] [id number]

## 2023-02-23 NOTE — PROGRESS NOTES
HEART CARE ENCOUNTER NOTE      Thank you, Kaylan Colón, for asking the [facility] team to see Ms. Madi Padilla to evaluate syncope.      Assessment/Recommendations   Assessment:    Dizziness and syncope. Unclear etiology but possibly related to anxiety. Normal resting electrocardiogram, ambulatory cardiac monitor, and transthoracic echocardiogram making a cardiac etiology unlikely.  Preoperative cardiovascular examination prior to breast surgery scheduled for 2/27/2022. Patient is at a low risk of perioperative major adverse cardiac events.  Former smoker.    Plan:  No additional cardiac testing or treatment is needed. Hydration could be helpful.  Okay to proceed with planned surgery from a cardiac standpoint.  Follow-up as needed.         History of Present Illness   Ms. Madi Padilla is a 47 year old adult with a significant past history of tobacco use presenting for evaluation of dizziness and syncope. They have had this issue their entire life, maybe happening more frequently now. It seems more likely to happen in the morning although it is unpredictable when it happens. Loss of consciousness is brief. No chest pain, shortness of breath, palpitations, lower extremity edema, orthopnea, or paroxysmal nocturnal dyspnea. Able to do a lot of walking and shoveling snow without any difficulty. Has upcoming breast surgery scheduled for 2/27/2022. They do endorse a lot of anxiety and drinking 1-2 pots of coffee per day.       Cardiac Problems and Cardiac Diagnostics     Most Recent Cardiac testing:  ECG dated 1/31/2023 (personaly reviewed and interpreted): sinus bradycardia HR 58 bpm, otherwise normal    ECHO 2/14/2023 (report reviewed):   Left ventricular size, wall motion and function are normal. The ejection fraction is 60-65%.  Normal right ventricle size and systolic function.  Both atria are normal in size.  No significant valve disease is identified.  There is no comparison  "study available.    Holter monitor 2/1/2023 (report reviewed):  STUDY INDICATION: Syncope, bradycardia   PREDOMINANT RHYTHM: Normal sinus rhythm, with normal heart rate response.   CARDIAC CONDUCTION: [place] interval, QRS duration, QT interval all appeared to be normal. No significant bradycardia/pauses.   ATRIAL ARRHYTHMIA: Rare atrial ectopy. No nonsustained ectopic atrial tachycardia. No sustained ectopic atrial tachycardia, atrial fibrillation, or other SVT.   VENTRICULAR ARRHYTHMIA: Rare ventricular ectopy. No complex ventricular ectopy or sustained ventricular tachyarrhythmia.   SYMPTOMATIC RECORDINGS: Patient did report cardiac symptoms including \"fainting\", \"lost feeling\", and \"numbness\" on 2 occasions. Patient's rhythm demonstrated sinus tachycardia with rates ranging between 105 and 122 bpm. No ectopy or other rhythm disturbance was demonstrated. Essentially normal   IMPRESSION: Essentially normal 48-hour Holter monitor. The patient's symptomatic recordings did not correlate with any arrhythmia. The patient demonstrated no evidence of significant bradycardia or pauses.       Medications  Allergies   Current Outpatient Medications   Medication Sig Dispense Refill     albuterol (PROAIR HFA/PROVENTIL HFA/VENTOLIN HFA) 108 (90 Base) MCG/ACT inhaler Inhale 2 puffs into the lungs every 4 hours as needed for shortness of breath or wheezing Use with spacer 36 g 1     DULERA 100-5 MCG/ACT inhaler Inhale 2 puffs into the lungs 2 times daily 39 g 3     estradiol (ESTRACE) 2 MG tablet TAKE FOUR TABLETS (8 MG) BY MOUTH DAILY 120 tablet 1     nicotine (NICORETTE) 4 MG lozenge Place 1 lozenge (4 mg) inside cheek every hour as needed for smoking cessation Max 5 lozenge/6 hours and 20 lozenges/24 hours 600 lozenge 1     QUEtiapine (SEROQUEL) 25 MG tablet Take 1 tablet (25 mg) by mouth 2 times daily 180 tablet 1     Spacer/Aero-Holding Chambers (SPACER/AERO-HOLD CHAMBER MASK) CONSUELO 1 each every 4 hours as needed (use of " "albuterol) 1 each 0     tiotropium (SPIRIVA) 18 MCG inhaled capsule Inhale 1 capsule (18 mcg) into the lungs daily 90 capsule 1      Allergies   Allergen Reactions     Penicillins Swelling     Seafood Angioedema        Physical Examination Review of Systems   /60 (BP Location: Left arm, Patient Position: Sitting, Cuff Size: Adult Regular)   Pulse 80   Resp 16   Ht 1.702 m (5' 7\")   Wt 72.3 kg (159 lb 8 oz)   BMI 24.98 kg/m    Body mass index is 24.98 kg/m .  Wt Readings from Last 3 Encounters:   02/23/23 72.3 kg (159 lb 8 oz)   02/08/23 71.7 kg (158 lb)   02/01/23 72.1 kg (159 lb)       General Appearance:   Pleasant  adult, appears  stated age. no acute distress, normal body habitus   ENT/Mouth: Facemask.     EYES:  no scleral icterus, normal conjunctivae   Neck: no carotid bruits. No anterior cervical lymphadenopaty   Respiratory:   lungs are clear to auscultation, no rales or wheezing, equal chest wall expansion    Cardiovascular:   Regular rhythm, normal rate. Normal first and second heart sounds with no murmurs, rubs, or gallops; the carotid, radial and posterior tibial pulses are intact, Jugular venous pressure normal, no edema bilaterally    Abdomen/GI:  no organomegaly, masses, bruits, or tenderness; bowel sounds are present   Extremities: no cyanosis or clubbing   Skin: no xanthelasma, warm.    Heme/lymph/ Immunology No apparent bleeding noted.   Neurologic: Alert and oriented. normal gait, no tremors     Psychiatric: Pleasant, calm, appropriate affect.    A complete 10 system review of systems was performed and is negative except as mentioned in the HPI/subjective.         Past History   Past Medical History:   Past Medical History:   Diagnosis Date     COPD without exacerbation (H)        Past Surgical History:   Past Surgical History:   Procedure Laterality Date     EYE MUSCLE SURGERY       ORCHIECTOMY SCROTAL BILATERAL       TOOTH EXTRACTION       VASECTOMY         Family History:   Family " History   Problem Relation Age of Onset     No Known Problems Mother      No Known Problems Father      Cancer Sister         intestinal cancer     No Known Problems Brother      Coronary Artery Disease Maternal Grandmother      Cancer Maternal Aunt         throat        Social History:   Social History     Socioeconomic History     Marital status: Single     Spouse name: Not on file     Number of children: Not on file     Years of education: Not on file     Highest education level: Not on file   Occupational History     Not on file   Tobacco Use     Smoking status: Former     Packs/day: 0.50     Years: 10.00     Pack years: 5.00     Types: Cigarettes     Start date: 2/3/1980     Quit date: 2022     Years since quittin.0     Smokeless tobacco: Never   Substance and Sexual Activity     Alcohol use: Not Currently     Drug use: Never     Sexual activity: Yes     Partners: Male     Birth control/protection: None   Other Topics Concern     Parent/sibling w/ CABG, MI or angioplasty before 65F 55M? No   Social History Narrative     Not on file     Social Determinants of Health     Financial Resource Strain: Not on file   Food Insecurity: Not on file   Transportation Needs: Not on file   Physical Activity: Not on file   Stress: Not on file   Social Connections: Not on file   Intimate Partner Violence: Not on file   Housing Stability: Not on file              Lab Results    Chemistry/lipid CBC    Lab Results   Component Value Date    CHOL 188 2023    HDL 53 2023     (H) 2023    TRIG 86 2023    CR 0.83 2023    BUN 8.4 2023    POTASSIUM 4.8 2023     2023    CO2 28 2023      Lab Results   Component Value Date    WBC 12.2 (H) 2023    HGB 13.3 2023    HCT 41.1 2023    MCV 95 2023     2023             Wagner Call MD [facility]  Non-Invasive Cardiologist  [facility]  Pager [phone]

## 2023-02-24 RX ORDER — FENTANYL CITRATE 50 UG/ML
25 INJECTION, SOLUTION INTRAMUSCULAR; INTRAVENOUS EVERY 5 MIN PRN
Status: CANCELLED | OUTPATIENT
Start: 2023-02-24

## 2023-02-24 RX ORDER — HYDROMORPHONE HYDROCHLORIDE 1 MG/ML
0.2 INJECTION, SOLUTION INTRAMUSCULAR; INTRAVENOUS; SUBCUTANEOUS EVERY 5 MIN PRN
Status: CANCELLED | OUTPATIENT
Start: 2023-02-24

## 2023-02-24 RX ORDER — SODIUM CHLORIDE, SODIUM LACTATE, POTASSIUM CHLORIDE, CALCIUM CHLORIDE 600; 310; 30; 20 MG/100ML; MG/100ML; MG/100ML; MG/100ML
INJECTION, SOLUTION INTRAVENOUS CONTINUOUS
Status: CANCELLED | OUTPATIENT
Start: 2023-02-24

## 2023-02-24 RX ORDER — ONDANSETRON 4 MG/1
4 TABLET, ORALLY DISINTEGRATING ORAL EVERY 30 MIN PRN
Status: CANCELLED | OUTPATIENT
Start: 2023-02-24

## 2023-02-24 RX ORDER — FENTANYL CITRATE 50 UG/ML
50 INJECTION, SOLUTION INTRAMUSCULAR; INTRAVENOUS EVERY 5 MIN PRN
Status: CANCELLED | OUTPATIENT
Start: 2023-02-24

## 2023-02-24 RX ORDER — ONDANSETRON 2 MG/ML
4 INJECTION INTRAMUSCULAR; INTRAVENOUS EVERY 30 MIN PRN
Status: CANCELLED | OUTPATIENT
Start: 2023-02-24

## 2023-02-24 RX ORDER — LIDOCAINE 40 MG/G
CREAM TOPICAL
Status: CANCELLED | OUTPATIENT
Start: 2023-02-24

## 2023-02-24 RX ORDER — HYDROMORPHONE HYDROCHLORIDE 1 MG/ML
0.4 INJECTION, SOLUTION INTRAMUSCULAR; INTRAVENOUS; SUBCUTANEOUS EVERY 5 MIN PRN
Status: CANCELLED | OUTPATIENT
Start: 2023-02-24

## 2023-02-24 RX ORDER — ACETAMINOPHEN 325 MG/1
975 TABLET ORAL ONCE
Status: CANCELLED | OUTPATIENT
Start: 2023-02-24 | End: 2023-02-24

## 2023-02-27 ENCOUNTER — HOSPITAL ENCOUNTER (OUTPATIENT)
Facility: AMBULATORY SURGERY CENTER | Age: 48
Discharge: HOME | End: 2023-02-27
Attending: STUDENT IN AN ORGANIZED HEALTH CARE EDUCATION/TRAINING PROGRAM
Payer: COMMERCIAL

## 2023-02-27 DIAGNOSIS — F64.9 GENDER DYSPHORIA: ICD-10-CM

## 2023-02-27 RX ORDER — MOMETASONE FUROATE AND FORMOTEROL FUMARATE DIHYDRATE 100; 5 UG/1; UG/1
AEROSOL RESPIRATORY (INHALATION)
Qty: 39 G | Refills: 3 | Status: SHIPPED | OUTPATIENT
Start: 2023-02-27 | End: 2023-05-03

## 2023-03-24 DIAGNOSIS — Z87.891 PERSONAL HISTORY OF TOBACCO USE, PRESENTING HAZARDS TO HEALTH: Primary | ICD-10-CM

## 2023-03-24 NOTE — PROGRESS NOTES
Lab order for urine cotinine faxed to [facility] lab per pt request. Fax #[phone].  Gray DIAMOND RN

## 2023-03-28 ENCOUNTER — MYC REFILL (OUTPATIENT)
Dept: FAMILY MEDICINE | Facility: CLINIC | Age: 48
End: 2023-03-28
Payer: COMMERCIAL

## 2023-03-28 DIAGNOSIS — J45.30 MILD PERSISTENT ASTHMA WITHOUT COMPLICATION: ICD-10-CM

## 2023-03-29 ENCOUNTER — LAB (OUTPATIENT)
Dept: LAB | Facility: CLINIC | Age: 48
End: 2023-03-29
Payer: COMMERCIAL

## 2023-03-29 DIAGNOSIS — Z87.891 PERSONAL HISTORY OF TOBACCO USE, PRESENTING HAZARDS TO HEALTH: ICD-10-CM

## 2023-03-29 PROCEDURE — 99000 SPECIMEN HANDLING OFFICE-LAB: CPT

## 2023-03-29 PROCEDURE — 80323 ALKALOIDS NOS: CPT | Mod: 90

## 2023-03-29 NOTE — TELEPHONE ENCOUNTER
Routing refill request to provider for review/approval because:  LOV 2/08/2023     CT Rojas  [facility]

## 2023-04-01 LAB
ANABASINE UR-MCNC: <5 NG/ML
COTININE UR-MCNC: 1040 NG/ML
NICOTINE UR-MCNC: 1156 NG/ML
TRANS-3-OH-COTININE UR-MCNC: 1347 NG/ML

## 2023-04-12 DIAGNOSIS — F31.81 BIPOLAR 2 DISORDER (H): ICD-10-CM

## 2023-04-12 RX ORDER — QUETIAPINE FUMARATE 25 MG/1
TABLET, FILM COATED ORAL
Qty: 180 TABLET | Refills: 0 | Status: SHIPPED | OUTPATIENT
Start: 2023-04-12 | End: 2023-05-03

## 2023-04-12 NOTE — TELEPHONE ENCOUNTER
Last office visit: 2/8/2023     Future Appointments 4/12/2023 - 10/9/2023      Date Visit Type Length Department Provider     5/23/2023  1:40 PM RETURN BOTTOM 20 min [facility] Golden Lucero MD    Location Instructions:     Located in the Clinics and Surgery Center at [address] [place] [id number]. For parking options, enter the [facility] /arrival plaza from [facility] and attendants can assist you based on your needs.  parking is available for those with limited mobility M-F from 7 a.m. to 5 p.m. Due to short staffing, we are unable to offer  to all patients/visitors. Visit [email or web address] for more details.  Self-parking:&nbsp;  West Lot: Located across from the main entrance, this is a convenient option for patients. Enter on [facility]. Parking attendants available most hours to assist.&nbsp;     [facility] Ramp: Enter at the [facility] entrance (one block north of the [facility] main entrance). Do not enter the ramp from [facility] - this entrance is not staffed and is further from the [facility] main entrance.                   Requested Prescriptions   Pending Prescriptions Disp Refills     QUEtiapine (SEROQUEL) 25 MG tablet [Pharmacy Med Name: QUETIAPINE 25MG TABLETS] 180 tablet 1     Sig: TAKE 1 TABLET(25 MG) BY MOUTH TWICE DAILY       Antipsychotic Medications Passed - 4/12/2023  2:08 PM        Passed - Blood pressure under 140/90 in past 12 months     BP Readings from Last 3 Encounters:   02/23/23 108/60   02/08/23 110/64   02/01/23 108/70                 Passed - Lipid panel on file within the past 12 months     Recent Labs   Lab Test 01/31/23  1137 12/22/20  1445   CHOL 188 148   TRIG 86 103   HDL 53 43*   * 85   NHDL 135* 105   CHOLHDLRATIO  --  3.4               Passed - Heart Rate on file within past 12 months     Pulse Readings from Last 3 Encounters:   02/23/23 80   02/08/23 94   01/31/23 69               Passed - A1c or Glucose on file in past 12  "months     Recent Labs   Lab Test 01/31/23  1137   *       Please review patients last 3 weights. If a weight gain of >10 lbs exists, you may refill the prescription once after instructing the patient to schedule an appointment within the next 30 days.    Wt Readings from Last 3 Encounters:   02/23/23 72.3 kg (159 lb 8 oz)   02/08/23 71.7 kg (158 lb)   02/01/23 72.1 kg (159 lb)             Passed - Medication is active on med list        Passed - Patient is 18 years of age or older        Passed - Recent (6 mo) or future (30 days) visit within the authorizing provider's specialty     Patient had office visit in the last 6 months or has a visit in the next 30 days with authorizing provider or within the authorizing provider's specialty.  See \"Patient Info\" tab in inbasket, or \"Choose Columns\" in Meds & Orders section of the refill encounter.                       "

## 2023-04-18 DIAGNOSIS — Z87.891 PERSONAL HISTORY OF TOBACCO USE, PRESENTING HAZARDS TO HEALTH: Primary | ICD-10-CM

## 2023-04-19 ENCOUNTER — LAB (OUTPATIENT)
Dept: LAB | Facility: CLINIC | Age: 48
End: 2023-04-19
Payer: COMMERCIAL

## 2023-04-19 ENCOUNTER — TELEPHONE (OUTPATIENT)
Dept: FAMILY MEDICINE | Facility: CLINIC | Age: 48
End: 2023-04-19

## 2023-04-19 DIAGNOSIS — Z87.891 PERSONAL HISTORY OF TOBACCO USE, PRESENTING HAZARDS TO HEALTH: ICD-10-CM

## 2023-04-19 DIAGNOSIS — Z51.81 ENCOUNTER FOR THERAPEUTIC DRUG MONITORING: ICD-10-CM

## 2023-04-19 DIAGNOSIS — Z78.9 MALE-TO-FEMALE TRANSGENDER PERSON: Primary | ICD-10-CM

## 2023-04-19 PROCEDURE — 99000 SPECIMEN HANDLING OFFICE-LAB: CPT

## 2023-04-19 PROCEDURE — 80323 ALKALOIDS NOS: CPT | Mod: 90

## 2023-04-25 DIAGNOSIS — J45.30 MILD PERSISTENT ASTHMA WITHOUT COMPLICATION: ICD-10-CM

## 2023-04-26 NOTE — TELEPHONE ENCOUNTER
Routing refill request to provider for review/approval because:  LOV 2/08/2023     Asthma Maintenance Inhalers - Anticholinergics Failed      Asthma control assessment score within normal limits in last 6 months           CT Rojas  [facility]

## 2023-04-27 ENCOUNTER — LAB (OUTPATIENT)
Dept: LAB | Facility: CLINIC | Age: 48
End: 2023-04-27
Payer: COMMERCIAL

## 2023-04-27 ENCOUNTER — PATIENT OUTREACH (OUTPATIENT)
Dept: PLASTIC SURGERY | Facility: CLINIC | Age: 48
End: 2023-04-27

## 2023-04-27 DIAGNOSIS — Z78.9 MALE-TO-FEMALE TRANSGENDER PERSON: ICD-10-CM

## 2023-04-27 DIAGNOSIS — Z51.81 ENCOUNTER FOR THERAPEUTIC DRUG MONITORING: ICD-10-CM

## 2023-04-27 PROCEDURE — 36415 COLL VENOUS BLD VENIPUNCTURE: CPT

## 2023-04-27 PROCEDURE — 82670 ASSAY OF TOTAL ESTRADIOL: CPT

## 2023-04-27 RX ORDER — TIOTROPIUM BROMIDE 18 UG/1
CAPSULE ORAL; RESPIRATORY (INHALATION)
Qty: 90 CAPSULE | Refills: 1 | Status: SHIPPED | OUTPATIENT
Start: 2023-04-27 | End: 2023-05-03

## 2023-04-27 NOTE — PATIENT INSTRUCTIONS
Spoke with pt after discussing urine cotinine test result with Dr Davenport. I updated that pt's results are negative and that we could move forward with scheduling surgery. Pt requested that their partner also have surgery at the same time as it was scheduled previously. I updated that unfortunately, her partner's test results are not negative so this was not possible. Pt then put partner on the phone and I was able to provide update. Pt verbalized understanding but declines to be scheduled at this time due to desire to schedule at the same time as their partner. She will call when she is ready to schedule top surgery with Dr Davenport.  Gray DIAMOND RN

## 2023-04-28 LAB — ESTRADIOL SERPL-MCNC: 69 PG/ML

## 2023-05-03 ENCOUNTER — OFFICE VISIT (OUTPATIENT)
Dept: FAMILY MEDICINE | Facility: CLINIC | Age: 48
End: 2023-05-03
Payer: COMMERCIAL

## 2023-05-03 VITALS
HEART RATE: 66 BPM | BODY MASS INDEX: 24.2 KG/M2 | SYSTOLIC BLOOD PRESSURE: 90 MMHG | OXYGEN SATURATION: 97 % | DIASTOLIC BLOOD PRESSURE: 60 MMHG | RESPIRATION RATE: 16 BRPM | WEIGHT: 159.7 LBS | HEIGHT: 68 IN

## 2023-05-03 DIAGNOSIS — Z72.0 TOBACCO ABUSE: ICD-10-CM

## 2023-05-03 DIAGNOSIS — J42 CHRONIC BRONCHITIS, UNSPECIFIED CHRONIC BRONCHITIS TYPE (H): ICD-10-CM

## 2023-05-03 DIAGNOSIS — J45.30 MILD PERSISTENT ASTHMA WITHOUT COMPLICATION: ICD-10-CM

## 2023-05-03 DIAGNOSIS — F31.81 BIPOLAR 2 DISORDER (H): ICD-10-CM

## 2023-05-03 DIAGNOSIS — Z71.6 TOBACCO ABUSE COUNSELING: ICD-10-CM

## 2023-05-03 DIAGNOSIS — F64.0 TRANSSEXUALISM: ICD-10-CM

## 2023-05-03 PROCEDURE — 99214 OFFICE O/P EST MOD 30 MIN: CPT | Performed by: FAMILY MEDICINE

## 2023-05-03 RX ORDER — ALBUTEROL SULFATE 90 UG/1
2 AEROSOL, METERED RESPIRATORY (INHALATION) EVERY 4 HOURS PRN
Qty: 36 G | Refills: 1 | Status: SHIPPED | OUTPATIENT
Start: 2023-05-03 | End: 2023-09-12

## 2023-05-03 RX ORDER — QUETIAPINE FUMARATE 25 MG/1
25 TABLET, FILM COATED ORAL 2 TIMES DAILY
Qty: 180 TABLET | Refills: 1 | Status: SHIPPED | OUTPATIENT
Start: 2023-05-03 | End: 2023-09-12

## 2023-05-03 RX ORDER — MOMETASONE FUROATE AND FORMOTEROL FUMARATE DIHYDRATE 100; 5 UG/1; UG/1
2 AEROSOL RESPIRATORY (INHALATION) 2 TIMES DAILY
Qty: 39 G | Refills: 3 | Status: SHIPPED | OUTPATIENT
Start: 2023-05-03 | End: 2023-09-12

## 2023-05-03 RX ORDER — ESTRADIOL 2 MG/1
TABLET ORAL
Qty: 120 TABLET | Refills: 2 | Status: SHIPPED | OUTPATIENT
Start: 2023-05-03 | End: 2023-09-12

## 2023-05-03 RX ORDER — TIOTROPIUM BROMIDE 18 UG/1
CAPSULE ORAL; RESPIRATORY (INHALATION)
Qty: 90 CAPSULE | Refills: 3 | Status: SHIPPED | OUTPATIENT
Start: 2023-05-03 | End: 2023-09-12

## 2023-05-03 NOTE — PROGRESS NOTES
Assessment & Plan   Problem List Items Addressed This Visit        Other    Gender dysphoria in adolescent and adult    Relevant Medications    estradiol (ESTRACE) 2 MG tablet   Other Visit Diagnoses     Chronic bronchitis, unspecified chronic bronchitis type (H)        Relevant Medications    albuterol (PROAIR HFA/PROVENTIL HFA/VENTOLIN HFA) 108 (90 Base) MCG/ACT inhaler    Mild persistent asthma without complication        Relevant Medications    albuterol (PROAIR HFA/PROVENTIL HFA/VENTOLIN HFA) 108 (90 Base) MCG/ACT inhaler    mometasone-formoterol (DULERA) 100-5 MCG/ACT inhaler    tiotropium (SPIRIVA HANDIHALER) 18 MCG inhaled capsule    Tobacco abuse        Relevant Medications    nicotine (NICORETTE) 4 MG lozenge    Tobacco abuse counseling        Relevant Medications    nicotine (NICORETTE) 4 MG lozenge    Bipolar 2 disorder (H)        Relevant Medications    QUEtiapine (SEROQUEL) 25 MG tablet      COPD  patient tells me that she recently was at [facility] getting spirometry done for Social Security disability exam.  She has signed a release to try to get those records for me.      Gender dysphoria she recently had testing done for nicotine and passed the test.  However she is holding off for top surgery for now because her partner was unable to pass the test and they want to do surgery together.  Instead she is going to concentrate on getting bottom surgery done.  She was given a prescription for a 1 month supply of her estradiol with 1 refill back in February but tells me that she has still been taking her medicine.  Explained to her that this means that she has missed enough doses that she still has some medications.  Therefore I do not think we need to make any dosing changes but rather we need to work on her improving medication compliance.Pt provided with med dispenser for a weekly set up so she can better keep track of taking her daily meds    Bipolar disorder and anxiety patient reports  "that she feels like symptoms are better controlled with the quetiapine 25 mg immediate release twice daily and that she does not need to make any changes.  Refill provided.    would like patient to get labs repeated in 2 months and follow-up with me afterwards.                     Kaylan Neville MD  [facility]    Verna Alvarez is a 47 year old, presenting for the following health issues:  Recheck Medication         View : No data to display.              History of Present Illness     Asthma:  She presents for follow up of asthma.  She has no cough, some wheezing, and no shortness of breath. She is using a relief medication a few times a week. She does not miss any doses of her controller medication throughout the week.Patient is aware of the following triggers: cold air and humidity. The patient has not had a visit to the Emergency Room, Urgent Care or Hospital due to asthma since the last clinic visit.     COPD:  She presents for follow up of COPD.  Overall, COPD symptoms are stable since last visit. She has same as usual fatigue or shortness of breath with exertion and same as usual shortness of breath at rest.  She sometimes coughs and does not have change in sputum. No recent fever. She can walk the length of 3-5 rooms without stopping to rest. She can walk 1 flights of stairs without resting.The patient has had no ED, urgent care, or hospital admissions because of COPD since the last visit.     She eats 0-1 servings of fruits and vegetables daily.She consumes 0 sweetened beverage(s) daily.She exercises with enough effort to increase her heart rate 9 or less minutes per day.  She exercises with enough effort to increase her heart rate 3 or less days per week.   She is taking medications regularly.               Review of Systems         Objective    BP 90/60 (BP Location: Right arm, Patient Position: Sitting)   Pulse 66   Resp 16   Ht 1.715 m (5' 7.5\")   Wt 72.4 kg " (159 lb 11.2 oz)   SpO2 97%   BMI 24.64 kg/m    Body mass index is 24.64 kg/m .  Physical Exam                              Past Surgical History:   Procedure Laterality Date     EYE MUSCLE SURGERY       ORCHIECTOMY SCROTAL BILATERAL       TOOTH EXTRACTION       VASECTOMY         Patient Active Problem List   Diagnosis     Gender dysphoria in adolescent and adult     Current use of estrogen therapy     Gender dysphoria     History of incarceration     History of orchiectomy       Current Outpatient Medications   Medication Sig Dispense Refill     albuterol (PROAIR HFA/PROVENTIL HFA/VENTOLIN HFA) 108 (90 Base) MCG/ACT inhaler Inhale 2 puffs into the lungs every 4 hours as needed for shortness of breath or wheezing Use with spacer 36 g 1     estradiol (ESTRACE) 2 MG tablet TAKE FOUR TABLETS (8 MG) BY MOUTH DAILY 120 tablet 2     mometasone-formoterol (DULERA) 100-5 MCG/ACT inhaler Inhale 2 puffs into the lungs 2 times daily 39 g 3     nicotine (NICORETTE) 4 MG lozenge Place 1 lozenge (4 mg) inside cheek every hour as needed for smoking cessation Max 5 lozenge/6 hours and 20 lozenges/24 hours 600 lozenge 1     QUEtiapine (SEROQUEL) 25 MG tablet Take 1 tablet (25 mg) by mouth 2 times daily 180 tablet 1     Spacer/Aero-Holding Chambers (SPACER/AERO-HOLD CHAMBER MASK) CONSUELO 1 each every 4 hours as needed (use of albuterol) 1 each 0     tiotropium (SPIRIVA HANDIHALER) 18 MCG inhaled capsule INHALE THE CONTENTS OF 1 CAPSULE(18 MCG) INTO THE LUNGS DAILY 90 capsule 3       History   Smoking Status     Former     Packs/day: 0.50     Years: 10.00     Types: Cigarettes     Start date: 2/3/1980     Quit date: 2/23/2022   Smokeless Tobacco     Never          Allergies   Allergen Reactions     Penicillins Swelling     Seafood Angioedema       Kaylan Neville MD

## 2023-05-08 ENCOUNTER — HEALTH MAINTENANCE LETTER (OUTPATIENT)
Age: 48
End: 2023-05-08

## 2023-05-09 ENCOUNTER — OFFICE VISIT (OUTPATIENT)
Dept: PLASTIC SURGERY | Facility: CLINIC | Age: 48
End: 2023-05-09
Payer: COMMERCIAL

## 2023-05-09 VITALS
DIASTOLIC BLOOD PRESSURE: 80 MMHG | WEIGHT: 160 LBS | HEART RATE: 73 BPM | SYSTOLIC BLOOD PRESSURE: 120 MMHG | OXYGEN SATURATION: 98 % | BODY MASS INDEX: 24.69 KG/M2

## 2023-05-09 DIAGNOSIS — F64.9 GENDER DYSPHORIA: Primary | ICD-10-CM

## 2023-05-09 PROCEDURE — 99215 OFFICE O/P EST HI 40 MIN: CPT | Performed by: UROLOGY

## 2023-05-09 ASSESSMENT — PAIN SCALES - GENERAL: PAINLEVEL: NO PAIN (0)

## 2023-05-09 NOTE — PROGRESS NOTES
RETURN NOTE FOR [facility]    CC: gender dysphoria     47yo transgender female.  Patient has been living as a female for 5 years.  Preferred pronouns are: she/her  The patient has been on exogenous hormones since: 6-7 years.  In terms of an intimate relationship, the patient is in a relationship with a transfemaleBeverly who is present for this visit .  Has had previous orchiectomy   Her goals are vaginoplasty, minimal depth.   She has quit smoking, but is using a nicotine pill replacement to curb cravings. Passed nicotine test.     Previously missed top surgery due to a family emergency. Requests that her surgery be at the same day as her partner. They have a ride to and from the hospital, they would have a friend who is a home health nurse who can help at home. They would like to proceed with surgery.     States that letters have been updated.     Planning on moving back to [place], would like to try to get surgery done prior.     /80   Pulse 73   Wt 72.6 kg (160 lb)   SpO2 98%   BMI 24.69 kg/m    Abdomen: not obese, soft, non-distended, non-tender. No organomegaly  : deferred. Performed during last visit  LE: no edema.            Assessment and Plan:   46 year old transgender with gender dysphoria    The criteria for genital surgery are specific to the type of surgery being requested.  Criteria for bottom surgery:    1. Persistent, well documented gender dysphoria;  2. Capacity to make a fully informed decision and to consent for treatment;  3. Age of consent (>18 years old)  4. If significant medical or mental health concerns are present, they must be well controlled.  5. 12 continuous months of hormone therapy as appropriate to the patient s gender goals (unless  the patient has a medical contraindication or is otherwise unable or unwilling to take  Hormones).  6. Two letters of support    The aim of hormone therapy prior to gonadectomy is primarily to introduce a period of reversible  estrogen or  testosterone suppression, before the patient undergoes irreversible surgical intervention.    She has a persistent, well documented gender dysphoria. She has capacity to make a fully informed decision and to consent for treatment. Her mental health issues are well controlled. She has been on continuous hormones for years. She has 2 letters of support.     The patient meets all of these criteria. We discussed that gender affirmation surgery should be considered permanent. We discussed risks/complications of rectal injury, rectovaginal fistula, bleeding, fluid collection, infection, injury to surrounding structures, flap loss, sensory loss, wound dehiscence, vaginal prolapse, vaginal shrinkage/stenosis, need for lifelong dilation, urinary stream abnormalities, DVT/PE and need for revision surgery.     We discussed the option for minimal depth and full depth. She would like minimal-depth vaginoplasty     We also discussed the need to stop hormones sary-procedurally for 2 weeks before and after surgery.     We discussed that transgender vaginoplasty for this patient would include: penectomy, clitoroplasty, labiaplasty, urethral reconstruction, creation of a vagina, and scrotectomy.     PLAN:   -Will plan to proceed with minimal-depth vaginoplasty prior to top surgery   -We will need to ensure both letters meet criteria  -Will submit PA once letters in place.  -Then will work on dates/cotinine testing.    Kvng Stone  PGY-3  [phone]    Physician Attestation   I, Golden Lucero MD, saw this patient and agree with the findings and plan of care as documented in the note.      Golden Lucero MD  Reconstructive Urology    40 minutes spent by me on the date of the encounter doing chart review, history and exam, documentation and further activities per the note

## 2023-05-09 NOTE — NURSING NOTE
Chief Complaint   Patient presents with     RECHECK     Follow up consultation       Vitals:    05/09/23 1249   BP: 120/80   Pulse: 73   SpO2: 98%       There is no height or weight on file to calculate BMI.          DUY ANTOINE EMT

## 2023-05-09 NOTE — LETTER
5/9/2023       RE: Madi Padilla  [address]  [facility] [address]     Dear Colleague,    Thank you for referring your patient, Madi Padilla, to the [facility] [place] at [facility]. Please see a copy of my visit note below.    RETURN NOTE FOR [facility] [place]    CC: gender dysphoria     45yo transgender female.  Patient has been living as a female for 5 years.  Preferred pronouns are: she/her  The patient has been on exogenous hormones since: 6-7 years.  In terms of an intimate relationship, the patient is in a relationship with a zackemaleBeverly who is present for this visit .  Has had previous orchiectomy   Her goals are vaginoplasty, minimal depth.   She has quit smoking, but is using a nicotine pill replacement to curb cravings. Passed nicotine test.     Previously missed top surgery due to a family emergency. Requests that her surgery be at the same day as her partner. They have a ride to and from the hospital, they would have a friend who is a home health nurse who can help at home. They would like to proceed with surgery.     States that letters have been updated.     Planning on moving back to [place], would like to try to get surgery done prior.     /80   Pulse 73   Wt 72.6 kg (160 lb)   SpO2 98%   BMI 24.69 kg/m    Abdomen: not obese, soft, non-distended, non-tender. No organomegaly  : deferred. Performed during last visit  LE: no edema.            Assessment and Plan:   46 year old transgender with gender dysphoria    The criteria for genital surgery are specific to the type of surgery being requested.  Criteria for bottom surgery:    1. Persistent, well documented gender dysphoria;  2. Capacity to make a fully informed decision and to consent for treatment;  3. Age of consent (>18 years old)  4. If significant medical or mental health concerns are present, they must be well  controlled.  5. 12 continuous months of hormone therapy as appropriate to the patient s gender goals (unless  the patient has a medical contraindication or is otherwise unable or unwilling to take  Hormones).  6. Two letters of support    The aim of hormone therapy prior to gonadectomy is primarily to introduce a period of reversible  estrogen or testosterone suppression, before the patient undergoes irreversible surgical intervention.    She has a persistent, well documented gender dysphoria. She has capacity to make a fully informed decision and to consent for treatment. Her mental health issues are well controlled. She has been on continuous hormones for years. She has 2 letters of support.     The patient meets all of these criteria. We discussed that gender affirmation surgery should be considered permanent. We discussed risks/complications of rectal injury, rectovaginal fistula, bleeding, fluid collection, infection, injury to surrounding structures, flap loss, sensory loss, wound dehiscence, vaginal prolapse, vaginal shrinkage/stenosis, need for lifelong dilation, urinary stream abnormalities, DVT/PE and need for revision surgery.     We discussed the option for minimal depth and full depth. She would like minimal-depth vaginoplasty     We also discussed the need to stop hormones sary-procedurally for 2 weeks before and after surgery.     We discussed that transgender vaginoplasty for this patient would include: penectomy, clitoroplasty, labiaplasty, urethral reconstruction, creation of a vagina, and scrotectomy.     PLAN:   -Will plan to proceed with minimal-depth vaginoplasty prior to top surgery   -We will need to ensure both letters meet criteria  -Will submit PA once letters in place.  -Then will work on dates/cotinine testing.    Kvng Stone  PGY-3  [phone]    Physician Attestation   I, Golden Lucero MD, saw this patient and agree with the findings and plan of care as documented in the note.           40 minutes spent by me on the date of the encounter doing chart review, history and exam, documentation and further activities per the note                         Again, thank you for allowing me to participate in the care of your patient.      Sincerely,    Golden Lucero MD

## 2023-05-09 NOTE — TELEPHONE ENCOUNTER
FUTURE VISIT INFORMATION      FUTURE VISIT INFORMATION:    Date: 8/30/23    Time: 2:00pm    Location: [facility]  REFERRAL INFORMATION:    Reason for visit/diagnosis  Gender Care    RECORDS REQUESTED FROM:       No recs to collect

## 2023-06-05 ENCOUNTER — MYC MEDICAL ADVICE (OUTPATIENT)
Dept: PLASTIC SURGERY | Facility: CLINIC | Age: 48
End: 2023-06-05
Payer: COMMERCIAL

## 2023-06-06 NOTE — TELEPHONE ENCOUNTER
Called pt back to discuss questions about status of planning for bottom surgery. Pt was with her partner Myriam, who also seeks gender affirming bottom surgery with Dr Lucero. Explained that letters of support were received and will be reviewed within 3-4 weeks. If letters meet requirements, PA will be submitted. If an approval is received, surgery will be scheduled. Then, this RN will contact pt to discuss when nicotine testing should be completed (4 and 2 weeks before surgery). Pt understanding of this and thanked writer for calling.

## 2023-06-19 ENCOUNTER — DOCUMENTATION ONLY (OUTPATIENT)
Dept: PLASTIC SURGERY | Facility: CLINIC | Age: 48
End: 2023-06-19
Payer: COMMERCIAL

## 2023-06-19 NOTE — PROGRESS NOTES
[facility] :  Care Coordination Note     SITUATION   Kim Padilla is a 47 year old adult who is receiving support for:  Care Team  .    BACKGROUND     Reviewed LOS. They meet criteria. MA is from [place], so unlikely to need additional DA. Sent message to Dr. Lucero to request PA.     ASSESSMENT     Surgery              CGC Assessment  [facility] ([facility] Enrollment: Enrolled  Patient has a therapist: Yes  Name of therapist: stanley Watson  Letter of support #1: Received  Letter #1 Date: 05/16/23  Letter of support #2: Received  Letter #2 Date: 05/16/23  Surgery being considered: Yes  Augmentation: Yes  Vaginoplasty: Yes          PLAN          Nursing Interventions:       Follow-up plan:  Pa to be requested, surgery to be scheduled.       MILDRED NASH

## 2023-07-13 ENCOUNTER — TELEPHONE (OUTPATIENT)
Dept: OTOLARYNGOLOGY | Facility: CLINIC | Age: 48
End: 2023-07-13
Payer: COMMERCIAL

## 2023-07-13 NOTE — TELEPHONE ENCOUNTER
LVM that Deborah is no longer available on 8/30 and we need to reschedule. Can be added to waitlist. Gave call center number

## 2023-07-18 DIAGNOSIS — F64.9 GENDER DYSPHORIA: Primary | ICD-10-CM

## 2023-07-18 RX ORDER — HEPARIN SODIUM 5000 [USP'U]/.5ML
5000 INJECTION, SOLUTION INTRAVENOUS; SUBCUTANEOUS
Status: CANCELLED | OUTPATIENT
Start: 2023-07-18

## 2023-07-18 RX ORDER — CLINDAMYCIN PHOSPHATE 900 MG/50ML
900 INJECTION, SOLUTION INTRAVENOUS
Status: CANCELLED | OUTPATIENT
Start: 2023-07-18

## 2023-07-18 RX ORDER — CLINDAMYCIN PHOSPHATE 900 MG/50ML
900 INJECTION, SOLUTION INTRAVENOUS SEE ADMIN INSTRUCTIONS
Status: CANCELLED | OUTPATIENT
Start: 2023-07-18

## 2023-07-19 DIAGNOSIS — Z71.6 ENCOUNTER FOR SMOKING CESSATION COUNSELING: ICD-10-CM

## 2023-07-19 DIAGNOSIS — F64.9 GENDER DYSPHORIA: Primary | ICD-10-CM

## 2023-07-24 ENCOUNTER — TELEPHONE (OUTPATIENT)
Dept: UROLOGY | Facility: CLINIC | Age: 48
End: 2023-07-24
Payer: COMMERCIAL

## 2023-07-24 NOTE — TELEPHONE ENCOUNTER
Patient is scheduled for surgery with Dr. Lucero.     Spoke with: Patient    Date of Surgery: 10/09/23    Location: [facility]     Pre op with Provider: MARYJANE     H&P: Patient is going to give our office a call back to confirm she was able to get pre-op scheduled due to ride issues. If patient is unable to schedule, I will offer a virtual PAC appointment.     Additional imaging/appointments: Needs 2 week post-op with Dr. Lucero. Nothing available within schedule.     Surgery packet: Per patients request, will send surgery packet to address on file. Minimal depth and surgical soap sent.     Additional comments: MARYJANE.         Layla Conteh on 7/24/2023 at 2:50 PM

## 2023-08-04 ENCOUNTER — LAB (OUTPATIENT)
Dept: LAB | Facility: CLINIC | Age: 48
End: 2023-08-04
Payer: COMMERCIAL

## 2023-08-04 DIAGNOSIS — F64.9 GENDER DYSPHORIA: ICD-10-CM

## 2023-08-04 PROCEDURE — 99000 SPECIMEN HANDLING OFFICE-LAB: CPT

## 2023-08-04 PROCEDURE — G0480 DRUG TEST DEF 1-7 CLASSES: HCPCS | Mod: 90

## 2023-08-09 DIAGNOSIS — F64.9 GENDER DYSPHORIA: Primary | ICD-10-CM

## 2023-08-30 ENCOUNTER — PRE VISIT (OUTPATIENT)
Dept: OTOLARYNGOLOGY | Facility: CLINIC | Age: 48
End: 2023-08-30

## 2023-09-08 ENCOUNTER — MYC MEDICAL ADVICE (OUTPATIENT)
Dept: PLASTIC SURGERY | Facility: CLINIC | Age: 48
End: 2023-09-08
Payer: COMMERCIAL

## 2023-09-11 ASSESSMENT — PATIENT HEALTH QUESTIONNAIRE - PHQ9
SUM OF ALL RESPONSES TO PHQ QUESTIONS 1-9: 3
SUM OF ALL RESPONSES TO PHQ QUESTIONS 1-9: 3
10. IF YOU CHECKED OFF ANY PROBLEMS, HOW DIFFICULT HAVE THESE PROBLEMS MADE IT FOR YOU TO DO YOUR WORK, TAKE CARE OF THINGS AT HOME, OR GET ALONG WITH OTHER PEOPLE: NOT DIFFICULT AT ALL

## 2023-09-11 NOTE — TELEPHONE ENCOUNTER
Called pt to discuss planning for pre-op teaching. Pt prefers to do pre-op teaching with partner Sonya, who has the same surgery scheduled with Dr Lucero on the same day. Spoke with Sonya, who said this is what she prefers as well. Will call pt and partner on 9/13/23 between 9:30 and 10:00 am for pre-op teaching.

## 2023-09-12 ENCOUNTER — OFFICE VISIT (OUTPATIENT)
Dept: FAMILY MEDICINE | Facility: CLINIC | Age: 48
End: 2023-09-12
Payer: COMMERCIAL

## 2023-09-12 VITALS
RESPIRATION RATE: 16 BRPM | DIASTOLIC BLOOD PRESSURE: 68 MMHG | SYSTOLIC BLOOD PRESSURE: 110 MMHG | TEMPERATURE: 98 F | HEIGHT: 66 IN | WEIGHT: 160 LBS | BODY MASS INDEX: 25.71 KG/M2 | HEART RATE: 72 BPM | OXYGEN SATURATION: 97 %

## 2023-09-12 DIAGNOSIS — Z01.818 PREOP GENERAL PHYSICAL EXAM: ICD-10-CM

## 2023-09-12 DIAGNOSIS — Z51.81 ENCOUNTER FOR THERAPEUTIC DRUG MONITORING: ICD-10-CM

## 2023-09-12 DIAGNOSIS — R42 DIZZINESS: ICD-10-CM

## 2023-09-12 DIAGNOSIS — J45.30 MILD PERSISTENT ASTHMA WITHOUT COMPLICATION: ICD-10-CM

## 2023-09-12 DIAGNOSIS — Z78.9 MALE-TO-FEMALE TRANSGENDER PERSON: Primary | ICD-10-CM

## 2023-09-12 DIAGNOSIS — F31.81 BIPOLAR 2 DISORDER (H): ICD-10-CM

## 2023-09-12 DIAGNOSIS — Z71.6 TOBACCO ABUSE COUNSELING: ICD-10-CM

## 2023-09-12 DIAGNOSIS — Z71.89 ACP (ADVANCE CARE PLANNING): ICD-10-CM

## 2023-09-12 DIAGNOSIS — J42 CHRONIC BRONCHITIS, UNSPECIFIED CHRONIC BRONCHITIS TYPE (H): ICD-10-CM

## 2023-09-12 DIAGNOSIS — F64.0 TRANSSEXUALISM: ICD-10-CM

## 2023-09-12 LAB
BASOPHILS # BLD AUTO: 0 10E3/UL (ref 0–0.2)
BASOPHILS NFR BLD AUTO: 0 %
EOSINOPHIL # BLD AUTO: 0.1 10E3/UL (ref 0–0.7)
EOSINOPHIL NFR BLD AUTO: 2 %
ERYTHROCYTE [DISTWIDTH] IN BLOOD BY AUTOMATED COUNT: 13.1 % (ref 10–15)
HCT VFR BLD AUTO: 43.3 % (ref 35–53)
HGB BLD-MCNC: 14.3 G/DL (ref 11.7–17.7)
IMM GRANULOCYTES # BLD: 0 10E3/UL
IMM GRANULOCYTES NFR BLD: 0 %
LYMPHOCYTES # BLD AUTO: 2.1 10E3/UL (ref 0.8–5.3)
LYMPHOCYTES NFR BLD AUTO: 26 %
MCH RBC QN AUTO: 31.1 PG (ref 26.5–33)
MCHC RBC AUTO-ENTMCNC: 33 G/DL (ref 31.5–36.5)
MCV RBC AUTO: 94 FL (ref 78–100)
MONOCYTES # BLD AUTO: 0.6 10E3/UL (ref 0–1.3)
MONOCYTES NFR BLD AUTO: 8 %
NEUTROPHILS # BLD AUTO: 5.1 10E3/UL (ref 1.6–8.3)
NEUTROPHILS NFR BLD AUTO: 64 %
PLATELET # BLD AUTO: 258 10E3/UL (ref 150–450)
RBC # BLD AUTO: 4.6 10E6/UL (ref 3.8–5.9)
WBC # BLD AUTO: 8 10E3/UL (ref 4–11)

## 2023-09-12 PROCEDURE — 90746 HEPB VACCINE 3 DOSE ADULT IM: CPT | Performed by: FAMILY MEDICINE

## 2023-09-12 PROCEDURE — 82670 ASSAY OF TOTAL ESTRADIOL: CPT | Performed by: FAMILY MEDICINE

## 2023-09-12 PROCEDURE — 85025 COMPLETE CBC W/AUTO DIFF WBC: CPT | Mod: QW | Performed by: FAMILY MEDICINE

## 2023-09-12 PROCEDURE — 90750 HZV VACC RECOMBINANT IM: CPT | Performed by: FAMILY MEDICINE

## 2023-09-12 PROCEDURE — 90471 IMMUNIZATION ADMIN: CPT | Performed by: FAMILY MEDICINE

## 2023-09-12 PROCEDURE — 99214 OFFICE O/P EST MOD 30 MIN: CPT | Mod: 25 | Performed by: FAMILY MEDICINE

## 2023-09-12 PROCEDURE — 90632 HEPA VACCINE ADULT IM: CPT | Performed by: FAMILY MEDICINE

## 2023-09-12 PROCEDURE — 80048 BASIC METABOLIC PNL TOTAL CA: CPT | Performed by: FAMILY MEDICINE

## 2023-09-12 PROCEDURE — 90472 IMMUNIZATION ADMIN EACH ADD: CPT | Performed by: FAMILY MEDICINE

## 2023-09-12 PROCEDURE — 36415 COLL VENOUS BLD VENIPUNCTURE: CPT | Performed by: FAMILY MEDICINE

## 2023-09-12 RX ORDER — TIOTROPIUM BROMIDE 18 UG/1
CAPSULE ORAL; RESPIRATORY (INHALATION)
Qty: 90 CAPSULE | Refills: 3 | Status: SHIPPED | OUTPATIENT
Start: 2023-09-12 | End: 2024-02-27

## 2023-09-12 RX ORDER — ALBUTEROL SULFATE 90 UG/1
2 AEROSOL, METERED RESPIRATORY (INHALATION) EVERY 4 HOURS PRN
Qty: 36 G | Refills: 1 | Status: SHIPPED | OUTPATIENT
Start: 2023-09-12 | End: 2024-02-27

## 2023-09-12 RX ORDER — MOMETASONE FUROATE AND FORMOTEROL FUMARATE DIHYDRATE 100; 5 UG/1; UG/1
2 AEROSOL RESPIRATORY (INHALATION) 2 TIMES DAILY
Qty: 39 G | Refills: 3 | Status: SHIPPED | OUTPATIENT
Start: 2023-09-12 | End: 2024-05-15

## 2023-09-12 RX ORDER — ESTRADIOL 2 MG/1
TABLET ORAL
Qty: 120 TABLET | Refills: 2 | Status: SHIPPED | OUTPATIENT
Start: 2023-09-12 | End: 2024-01-09

## 2023-09-12 RX ORDER — QUETIAPINE FUMARATE 25 MG/1
25 TABLET, FILM COATED ORAL 2 TIMES DAILY
Qty: 180 TABLET | Refills: 1 | Status: SHIPPED | OUTPATIENT
Start: 2023-09-12 | End: 2024-02-27

## 2023-09-12 NOTE — PROGRESS NOTES
[address] [address]  Phone: [phone]  Fax: [phone]  Primary Provider: Rikki Hamm  Pre-op Performing Provider: RIKKI HAMM      PREOPERATIVE EVALUATION:  Today's date: 9/12/2023    Madi Padilla is a 47 year old adult who presents for a preoperative evaluation.      9/12/2023     1:45 PM   Additional Questions   Roomed by Lynne       Surgical Information:  Surgery/Procedure: vaginoplasty  Surgery Location: [facility]  Surgeon: Dr. Lucero  Surgery Date: 10/9/2023  Time of Surgery:   Where patient plans to recover: At home with family  Fax number for surgical facility: Note does not need to be faxed, will be available electronically in Epic.    Assessment & Plan     The proposed surgical procedure is considered INTERMEDIATE risk.    Problem List Items Addressed This Visit          Respiratory    Mild persistent asthma without complication    Relevant Medications    albuterol (PROAIR HFA/PROVENTIL HFA/VENTOLIN HFA) 108 (90 Base) MCG/ACT inhaler    mometasone-formoterol (DULERA) 100-5 MCG/ACT inhaler    tiotropium (SPIRIVA HANDIHALER) 18 MCG inhaled capsule       Behavioral    Bipolar 2 disorder (H)    Relevant Medications    QUEtiapine (SEROQUEL) 25 MG tablet       Other    Gender dysphoria in adolescent and adult    Relevant Medications    estradiol (ESTRACE) 2 MG tablet    Other Relevant Orders    Basic metabolic panel    CBC with Platelets & Differential (Completed)    Tobacco abuse counseling    Relevant Medications    nicotine (NICORETTE) 4 MG lozenge    Dizziness     Other Visit Diagnoses       Male-to-female transgender person    -  Primary    Preop general physical exam        Relevant Orders    Basic metabolic panel    CBC with Platelets & Differential (Completed)    Chronic bronchitis, unspecified chronic bronchitis type (H)        Relevant Medications    albuterol (PROAIR HFA/PROVENTIL  HFA/VENTOLIN HFA) 108 (90 Base) MCG/ACT inhaler    ACP (advance care planning)        Encounter for therapeutic drug monitoring                Mild persistent asthma versus COPD.  Patient should get an official spirometry testing done.  At this time we will continue the Dulera Spiriva and albuterol as needed.    Advance care planning discussed with patient today    Gender dysphoria.  Did review patient's estrogen today however would like her to hold her estrogen 1 week prior to surgery until after she is discharged on has returned to normal mobility.    History of tobacco abuse.  Patient would like to renew the nicotine lozenges.  She plans to stop this at least 3 days prior to surgery and not resume this until after she is discharged.  Did encourage her to remain nicotine free to improve healing and reduce risk of postop complications.    Dizziness with syncope patient has had referral to cardiology and they did not think this was related to a cardiac cause.  May be more likely to be related to bipolar disorder and anxiety.       Risks and Recommendations:  The patient has the following additional risks and recommendations for perioperative complications:  Pulmonary:    - Incentive spirometry post-op   - Consider Respiratory Therapy (Respiratory Care IP Consult) post-op    Antiplatelet or Anticoagulation Medication Instructions:   - Patient is on no antiplatelet or anticoagulation medications.    Additional Medication Instructions:  Hold estrogen 1 week prior to surgery    RECOMMENDATION:  APPROVAL GIVEN to proceed with proposed procedure, without further diagnostic evaluation.        {Provider  Link to MDM Help Grid    Subjective       HPI related to upcoming procedure: Patient is here today with her partner.  She is very excited about her bottom surgery scheduled within the next 30 days for her gender dysphoria.        9/5/2023     2:17 PM   Preop Questions   1. Have you ever had a heart attack or stroke? No    2. Have you ever had surgery on your heart or blood vessels, such as a stent placement, a coronary artery bypass, or surgery on an artery in your head, neck, heart, or legs? No   3. Do you have chest pain with activity? No   4. Do you have a history of  heart failure? No   5. Do you currently have a cold, bronchitis or symptoms of other infection? No   6. Do you have a cough, shortness of breath, or wheezing? No   7. Do you or anyone in your family have previous history of blood clots? No   8. Do you or does anyone in your family have a serious bleeding problem such as prolonged bleeding following surgeries or cuts? No   9. Have you ever had problems with anemia or been told to take iron pills? No   10. Have you had any abnormal blood loss such as black, tarry or bloody stools, or abnormal vaginal bleeding? No   10. Have you had any abnormal blood loss such as black, tarry or bloody stools? No   11. Have you ever had a blood transfusion? No   12. Are you willing to have a blood transfusion if it is medically needed before, during, or after your surgery? Yes   13. Have you or any of your relatives ever had problems with anesthesia? No   14. Do you have sleep apnea, excessive snoring or daytime drowsiness? No   15. Do you have any artifical heart valves or other implanted medical devices like a pacemaker, defibrillator, or continuous glucose monitor? No   16. Do you have artificial joints? No   17. Are you allergic to latex? No   18. Is there any chance that you may be pregnant? No       Health Care Directive:  Patient does not have a Health Care Directive or Living Will: Discussed advance care planning with patient; information given to patient to review.    Preoperative Review of :   reviewed - no record of controlled substances prescribed.      Review of Systems  Neg except as per HPI    Patient Active Problem List    Diagnosis Date Noted    Tobacco abuse counseling 09/12/2023     Priority: Medium     Dizziness 09/12/2023     Priority: Medium    Bipolar 2 disorder (H) 09/12/2023     Priority: Medium    Mild persistent asthma without complication 09/12/2023     Priority: Medium    History of incarceration 01/31/2023     Priority: Medium    History of orchiectomy 01/31/2023     Priority: Medium    Gender dysphoria 01/09/2023     Priority: Medium     Added automatically from request for surgery [id number]      Current use of estrogen therapy 01/05/2023     Priority: Medium    Gender dysphoria in adolescent and adult 04/13/2022     Priority: Medium     Added automatically from request for surgery [id number]        Past Medical History:   Diagnosis Date    COPD without exacerbation (H)     Mild persistent asthma without complication 9/12/2023     Past Surgical History:   Procedure Laterality Date    EYE MUSCLE SURGERY      ORCHIECTOMY SCROTAL BILATERAL      TOOTH EXTRACTION      VASECTOMY       Current Outpatient Medications   Medication Sig Dispense Refill    albuterol (PROAIR HFA/PROVENTIL HFA/VENTOLIN HFA) 108 (90 Base) MCG/ACT inhaler Inhale 2 puffs into the lungs every 4 hours as needed for shortness of breath or wheezing Use with spacer 36 g 1    estradiol (ESTRACE) 2 MG tablet TAKE FOUR TABLETS (8 MG) BY MOUTH DAILY 120 tablet 2    mometasone-formoterol (DULERA) 100-5 MCG/ACT inhaler Inhale 2 puffs into the lungs 2 times daily 39 g 3    nicotine (NICORETTE) 4 MG lozenge Place 1 lozenge (4 mg) inside cheek every hour as needed for smoking cessation Max 5 lozenge/6 hours and 20 lozenges/24 hours 600 lozenge 1    QUEtiapine (SEROQUEL) 25 MG tablet Take 1 tablet (25 mg) by mouth 2 times daily 180 tablet 1    Spacer/Aero-Holding Chambers (SPACER/AERO-HOLD CHAMBER MASK) CONSUELO 1 each every 4 hours as needed (use of albuterol) 1 each 0    tiotropium (SPIRIVA HANDIHALER) 18 MCG inhaled capsule INHALE THE CONTENTS OF 1 CAPSULE(18 MCG) INTO THE LUNGS DAILY 90 capsule 3       Allergies   Allergen Reactions    Penicillins Swelling  "   Seafood Angioedema        Social History     Tobacco Use    Smoking status: Former     Packs/day: 0.50     Years: 10.00     Pack years: 5.00     Types: Cigarettes     Start date: 2/3/1980     Quit date: 2022     Years since quittin.5    Smokeless tobacco: Never   Substance Use Topics    Alcohol use: Not Currently       History   Drug Use Unknown         Objective     /68 (BP Location: Right arm, Patient Position: Sitting)   Pulse 72   Temp 98  F (36.7  C) (Tympanic)   Resp 16   Ht 1.676 m (5' 6\")   Wt 72.6 kg (160 lb)   SpO2 97%   BMI 25.82 kg/m      Physical Exam    General: alert and oriented ×3 no acute distress.    HEENT: pupils are equal round and reactive to light extraocular motion is intact. Normocephalic and atraumatic.     Hearing is grossly normal and there is no otorrhea.     Chest: has bilateral rise with no increased work of breathing.  Ctab    Cardiovascular: normal perfusion and brisk capillary refill.s1s2    Musculoskeletal: no gross focal abnormalities and normal gait.    Neuro: no gross focal abnormalities and memory seems intact.    Psychiatric: speech is clear and coherent and fluent. Patient dressed appropriately for the weather. Mood is appropriate and affect is full.      Recent Labs   Lab Test 23  1137 10/17/22  1256 22  1511   HGB 13.3 12.8 12.3    240  --      --  141   POTASSIUM 4.8  --  4.3   CR 0.83  --  0.71        Diagnostics:  Labs pending at this time.  Results will be reviewed when available.   No EKG required, no history of coronary heart disease, significant arrhythmia, peripheral arterial disease or other structural heart disease.    Revised Cardiac Risk Index (RCRI):  The patient has the following serious cardiovascular risks for perioperative complications:   - No serious cardiac risks = 0 points     RCRI Interpretation: 0 points: Class I (very low risk - 0.4% complication rate)         Signed Electronically by: Kaylan HEBERT" MD Jamin  Copy of this evaluation report is provided to requesting physician.

## 2023-09-12 NOTE — PATIENT INSTRUCTIONS
For informational purposes only. Not to replace the advice of your health care provider. Copyright   2003,  [facility]. All rights reserved. Clinically reviewed by Georgina Chisholm MD. [facility] [id number] - REV .  Preparing for Your Surgery  Getting started  A nurse will call you to review your health history and instructions. They will give you an arrival time based on your scheduled surgery time. Please be ready to share:  Your doctor's clinic name and phone number  Your medical, surgical, and anesthesia history  A list of allergies and sensitivities  A list of medicines, including herbal treatments and over-the-counter drugs  Whether the patient has a legal guardian (ask how to send us the papers in advance)  Please tell us if you're pregnant--or if there's any chance you might be pregnant. Some surgeries may injure a fetus (unborn baby), so they require a pregnancy test. Surgeries that are safe for a fetus don't always need a test, and you can choose whether to have one.   If you have a child who's having surgery, please ask for a copy of Preparing for Your Child's Surgery.    Preparing for surgery  Within 10 to 30 days of surgery: Have a pre-op exam (sometimes called an H&P, or History and Physical). This can be done at a clinic or pre-operative center.  If you're having a , you may not need this exam. Talk to your care team.  At your pre-op exam, talk to your care team about all medicines you take. If you need to stop any medicines before surgery, ask when to start taking them again.  We do this for your safety. Many medicines can make you bleed too much during surgery. Some change how well surgery (anesthesia) drugs work.  Call your insurance company to let them know you're having surgery. (If you don't have insurance, call [phone].)  Call your clinic if there's any change in your health. This includes signs of a cold or flu (sore throat, runny nose, cough, rash, fever). It also  includes a scrape or scratch near the surgery site.  If you have questions on the day of surgery, call your hospital or surgery center.  Eating and drinking guidelines  For your safety: Unless your surgeon tells you otherwise, follow the guidelines below.  Eat and drink as usual until 8 hours before you arrive for surgery. After that, no food or milk.  Drink clear liquids until 2 hours before you arrive. These are liquids you can see through, like water, Gatorade, and Propel Water. They also include plain black coffee and tea (no cream or milk), candy, and breath mints. You can spit out gum when you arrive.  If you drink alcohol: Stop drinking it the night before surgery.  If your care team tells you to take medicine on the morning of surgery, it's okay to take it with a sip of water.  Preventing infection  Shower or bathe the night before and morning of your surgery. Follow the instructions your clinic gave you. (If no instructions, use regular soap.)  Don't shave or clip hair near your surgery site. We'll remove the hair if needed.  Don't smoke or vape the morning of surgery. You may chew nicotine gum up to 2 hours before surgery. A nicotine patch is okay.  Note: Some surgeries require you to completely quit smoking and nicotine. Check with your surgeon.  Your care team will make every effort to keep you safe from infection. We will:  Clean our hands often with soap and water (or an alcohol-based hand rub).  Clean the skin at your surgery site with a special soap that kills germs.  Give you a special gown to keep you warm. (Cold raises the risk of infection.)  Wear special hair covers, masks, gowns and gloves during surgery.  Give antibiotic medicine, if prescribed. Not all surgeries need antibiotics.  What to bring on the day of surgery  Photo ID and insurance card  Copy of your health care directive, if you have one  Glasses and hearing aids (bring cases)  You can't wear contacts during surgery  Inhaler and eye  drops, if you use them (tell us about these when you arrive)  CPAP machine or breathing device, if you use them  A few personal items, if spending the night  If you have . . .  A pacemaker, ICD (cardiac defibrillator) or other implant: Bring the ID card.  An implanted stimulator: Bring the remote control.  A legal guardian: Bring a copy of the certified (court-stamped) guardianship papers.  Please remove any jewelry, including body piercings. Leave jewelry and other valuables at home.  If you're going home the day of surgery  You must have a responsible adult drive you home. They should stay with you overnight as well.  If you don't have someone to stay with you, and you aren't safe to go home alone, we may keep you overnight. Insurance often won't pay for this.  After surgery  If it's hard to control your pain or you need more pain medicine, please call your surgeon's office.  Questions?   If you have any questions for your care team, list them here: _________________________________________________________________________________________________________________________________________________________________________ ____________________________________ ____________________________________ ____________________________________    How to Take Your Medication Before Surgery  - HOLD (do not take) estrogen for 1 week prior to surgery

## 2023-09-12 NOTE — H&P (VIEW-ONLY)
[address] [address]  Phone: [phone]  Fax: [phone]  Primary Provider: Rikki Hamm  Pre-op Performing Provider: RIKKI HAMM      PREOPERATIVE EVALUATION:  Today's date: 9/12/2023    Madi Padilla is a 47 year old adult who presents for a preoperative evaluation.      9/12/2023     1:45 PM   Additional Questions   Roomed by Lynne       Surgical Information:  Surgery/Procedure: vaginoplasty  Surgery Location: [facility]  Surgeon: Dr. Lucero  Surgery Date: 10/9/2023  Time of Surgery:   Where patient plans to recover: At home with family  Fax number for surgical facility: Note does not need to be faxed, will be available electronically in Epic.    Assessment & Plan     The proposed surgical procedure is considered INTERMEDIATE risk.    Problem List Items Addressed This Visit          Respiratory    Mild persistent asthma without complication    Relevant Medications    albuterol (PROAIR HFA/PROVENTIL HFA/VENTOLIN HFA) 108 (90 Base) MCG/ACT inhaler    mometasone-formoterol (DULERA) 100-5 MCG/ACT inhaler    tiotropium (SPIRIVA HANDIHALER) 18 MCG inhaled capsule       Behavioral    Bipolar 2 disorder (H)    Relevant Medications    QUEtiapine (SEROQUEL) 25 MG tablet       Other    Gender dysphoria in adolescent and adult    Relevant Medications    estradiol (ESTRACE) 2 MG tablet    Other Relevant Orders    Basic metabolic panel    CBC with Platelets & Differential (Completed)    Tobacco abuse counseling    Relevant Medications    nicotine (NICORETTE) 4 MG lozenge    Dizziness     Other Visit Diagnoses       Male-to-female transgender person    -  Primary    Preop general physical exam        Relevant Orders    Basic metabolic panel    CBC with Platelets & Differential (Completed)    Chronic bronchitis, unspecified chronic bronchitis type (H)        Relevant Medications    albuterol (PROAIR HFA/PROVENTIL  HFA/VENTOLIN HFA) 108 (90 Base) MCG/ACT inhaler    ACP (advance care planning)        Encounter for therapeutic drug monitoring                Mild persistent asthma versus COPD.  Patient should get an official spirometry testing done.  At this time we will continue the Dulera Spiriva and albuterol as needed.    Advance care planning discussed with patient today    Gender dysphoria.  Did review patient's estrogen today however would like her to hold her estrogen 1 week prior to surgery until after she is discharged on has returned to normal mobility.    History of tobacco abuse.  Patient would like to renew the nicotine lozenges.  She plans to stop this at least 3 days prior to surgery and not resume this until after she is discharged.  Did encourage her to remain nicotine free to improve healing and reduce risk of postop complications.    Dizziness with syncope patient has had referral to cardiology and they did not think this was related to a cardiac cause.  May be more likely to be related to bipolar disorder and anxiety.       Risks and Recommendations:  The patient has the following additional risks and recommendations for perioperative complications:  Pulmonary:    - Incentive spirometry post-op   - Consider Respiratory Therapy (Respiratory Care IP Consult) post-op    Antiplatelet or Anticoagulation Medication Instructions:   - Patient is on no antiplatelet or anticoagulation medications.    Additional Medication Instructions:  Hold estrogen 1 week prior to surgery    RECOMMENDATION:  APPROVAL GIVEN to proceed with proposed procedure, without further diagnostic evaluation.        {Provider  Link to MDM Help Grid    Subjective       HPI related to upcoming procedure: Patient is here today with her partner.  She is very excited about her bottom surgery scheduled within the next 30 days for her gender dysphoria.        9/5/2023     2:17 PM   Preop Questions   1. Have you ever had a heart attack or stroke? No    2. Have you ever had surgery on your heart or blood vessels, such as a stent placement, a coronary artery bypass, or surgery on an artery in your head, neck, heart, or legs? No   3. Do you have chest pain with activity? No   4. Do you have a history of  heart failure? No   5. Do you currently have a cold, bronchitis or symptoms of other infection? No   6. Do you have a cough, shortness of breath, or wheezing? No   7. Do you or anyone in your family have previous history of blood clots? No   8. Do you or does anyone in your family have a serious bleeding problem such as prolonged bleeding following surgeries or cuts? No   9. Have you ever had problems with anemia or been told to take iron pills? No   10. Have you had any abnormal blood loss such as black, tarry or bloody stools, or abnormal vaginal bleeding? No   10. Have you had any abnormal blood loss such as black, tarry or bloody stools? No   11. Have you ever had a blood transfusion? No   12. Are you willing to have a blood transfusion if it is medically needed before, during, or after your surgery? Yes   13. Have you or any of your relatives ever had problems with anesthesia? No   14. Do you have sleep apnea, excessive snoring or daytime drowsiness? No   15. Do you have any artifical heart valves or other implanted medical devices like a pacemaker, defibrillator, or continuous glucose monitor? No   16. Do you have artificial joints? No   17. Are you allergic to latex? No   18. Is there any chance that you may be pregnant? No       Health Care Directive:  Patient does not have a Health Care Directive or Living Will: Discussed advance care planning with patient; information given to patient to review.    Preoperative Review of :   reviewed - no record of controlled substances prescribed.      Review of Systems  Neg except as per HPI    Patient Active Problem List    Diagnosis Date Noted    Tobacco abuse counseling 09/12/2023     Priority: Medium     Dizziness 09/12/2023     Priority: Medium    Bipolar 2 disorder (H) 09/12/2023     Priority: Medium    Mild persistent asthma without complication 09/12/2023     Priority: Medium    History of incarceration 01/31/2023     Priority: Medium    History of orchiectomy 01/31/2023     Priority: Medium    Gender dysphoria 01/09/2023     Priority: Medium     Added automatically from request for surgery [id number]      Current use of estrogen therapy 01/05/2023     Priority: Medium    Gender dysphoria in adolescent and adult 04/13/2022     Priority: Medium     Added automatically from request for surgery [id number]        Past Medical History:   Diagnosis Date    COPD without exacerbation (H)     Mild persistent asthma without complication 9/12/2023     Past Surgical History:   Procedure Laterality Date    EYE MUSCLE SURGERY      ORCHIECTOMY SCROTAL BILATERAL      TOOTH EXTRACTION      VASECTOMY       Current Outpatient Medications   Medication Sig Dispense Refill    albuterol (PROAIR HFA/PROVENTIL HFA/VENTOLIN HFA) 108 (90 Base) MCG/ACT inhaler Inhale 2 puffs into the lungs every 4 hours as needed for shortness of breath or wheezing Use with spacer 36 g 1    estradiol (ESTRACE) 2 MG tablet TAKE FOUR TABLETS (8 MG) BY MOUTH DAILY 120 tablet 2    mometasone-formoterol (DULERA) 100-5 MCG/ACT inhaler Inhale 2 puffs into the lungs 2 times daily 39 g 3    nicotine (NICORETTE) 4 MG lozenge Place 1 lozenge (4 mg) inside cheek every hour as needed for smoking cessation Max 5 lozenge/6 hours and 20 lozenges/24 hours 600 lozenge 1    QUEtiapine (SEROQUEL) 25 MG tablet Take 1 tablet (25 mg) by mouth 2 times daily 180 tablet 1    Spacer/Aero-Holding Chambers (SPACER/AERO-HOLD CHAMBER MASK) CONSUELO 1 each every 4 hours as needed (use of albuterol) 1 each 0    tiotropium (SPIRIVA HANDIHALER) 18 MCG inhaled capsule INHALE THE CONTENTS OF 1 CAPSULE(18 MCG) INTO THE LUNGS DAILY 90 capsule 3       Allergies   Allergen Reactions    Penicillins Swelling  "   Seafood Angioedema        Social History     Tobacco Use    Smoking status: Former     Packs/day: 0.50     Years: 10.00     Pack years: 5.00     Types: Cigarettes     Start date: 2/3/1980     Quit date: 2022     Years since quittin.5    Smokeless tobacco: Never   Substance Use Topics    Alcohol use: Not Currently       History   Drug Use Unknown         Objective     /68 (BP Location: Right arm, Patient Position: Sitting)   Pulse 72   Temp 98  F (36.7  C) (Tympanic)   Resp 16   Ht 1.676 m (5' 6\")   Wt 72.6 kg (160 lb)   SpO2 97%   BMI 25.82 kg/m      Physical Exam    General: alert and oriented ×3 no acute distress.    HEENT: pupils are equal round and reactive to light extraocular motion is intact. Normocephalic and atraumatic.     Hearing is grossly normal and there is no otorrhea.     Chest: has bilateral rise with no increased work of breathing.  Ctab    Cardiovascular: normal perfusion and brisk capillary refill.s1s2    Musculoskeletal: no gross focal abnormalities and normal gait.    Neuro: no gross focal abnormalities and memory seems intact.    Psychiatric: speech is clear and coherent and fluent. Patient dressed appropriately for the weather. Mood is appropriate and affect is full.      Recent Labs   Lab Test 23  1137 10/17/22  1256 22  1511   HGB 13.3 12.8 12.3    240  --      --  141   POTASSIUM 4.8  --  4.3   CR 0.83  --  0.71        Diagnostics:  Labs pending at this time.  Results will be reviewed when available.   No EKG required, no history of coronary heart disease, significant arrhythmia, peripheral arterial disease or other structural heart disease.    Revised Cardiac Risk Index (RCRI):  The patient has the following serious cardiovascular risks for perioperative complications:   - No serious cardiac risks = 0 points     RCRI Interpretation: 0 points: Class I (very low risk - 0.4% complication rate)         Signed Electronically by: Kaylan HEBERT" MD Jamni  Copy of this evaluation report is provided to requesting physician.

## 2023-09-13 ENCOUNTER — TELEPHONE (OUTPATIENT)
Dept: PLASTIC SURGERY | Facility: CLINIC | Age: 48
End: 2023-09-13
Payer: COMMERCIAL

## 2023-09-13 DIAGNOSIS — F64.9 GENDER DYSPHORIA: Primary | ICD-10-CM

## 2023-09-13 LAB
ANION GAP SERPL CALCULATED.3IONS-SCNC: 12 MMOL/L (ref 7–15)
BUN SERPL-MCNC: 10 MG/DL (ref 6–20)
CALCIUM SERPL-MCNC: 9.9 MG/DL (ref 8.6–10)
CHLORIDE SERPL-SCNC: 100 MMOL/L (ref 98–107)
CREAT SERPL-MCNC: 0.75 MG/DL (ref 0.51–1.17)
DEPRECATED HCO3 PLAS-SCNC: 27 MMOL/L (ref 22–29)
EGFRCR SERPLBLD CKD-EPI 2021: >90 ML/MIN/1.73M2
ESTRADIOL SERPL-MCNC: 35 PG/ML
GLUCOSE SERPL-MCNC: 94 MG/DL (ref 70–99)
POTASSIUM SERPL-SCNC: 4.3 MMOL/L (ref 3.4–5.3)
SODIUM SERPL-SCNC: 139 MMOL/L (ref 136–145)

## 2023-09-13 NOTE — TELEPHONE ENCOUNTER
Pre and Post Op Patient Education                                       Diagnosis: gender dysphoria  Teaching pre and post op for minimal depth vaginoplasty  Person involved in teaching: patient + partner Sonya (who is also having minimal depth vaginoplasty with Dr Lucero on 10/9/23)      Motivation level: High  Asks questions: Yes  Eager to learn:  Yes  Cooperative: Yes  Receptive (willing/able to accept information): Yes    Patient demonstrates an understanding of the following:  - Date of surgery:  10/9/23 - Monday  - Location of surgery: [facility] - [address] [place] [id number]     - Pre operative History/Physical other testing prior to surgery: [facility] 9/12/23  No more than 30 days prior to surgery date.   Medications to take the day of surgery: Per PCP   Blood thinner medications discussed and when to stop (if applicable): Per PCP   Diabetes medication management (if applicable): Per PCP  - Urine anaylsis/urine culture to be collected on: To be collected on 9/18/23  Pre-op nicotine test: to be collected on 9/18/23    - Post-op follow-up:   10/24/23 at 1:00 pm with Dr Lucero  11/10/23 at 3:30 pm with Brtitani Jaeger NP    Patient verbalizes an understanding of the following:  - The need for a responsible adult  and someone to stay with them for the first 24 hours post-operatively: Pt to be in hospital x2 days after surgery, has ride arranged for pickup  - NPO per anesthesia guidelines: Yes  - Pre-op showering x2 with Hibiclens/chlorhexadine soap: Yes  - The need to stop/discontinue all hormones 1 weeks before surgery and may restart upon return home after surgery: Pt takes estadiol tablets, will stop after dose on 10/2/23.    Discussed   - Pain management after surgery  - Infection prevention and hand hygiene  - Surgical procedure site care taught  - Signs and symptoms of infection  - Wound care and will be taught at the time of discharge  - Reviewed  Vaginoplasty: Pre and Post Operative Instructions packet in full   - Information about how to contact the hospital, nurse, and clinic if needed    Postoperative Plans:  Pt has surgery scheduled on the same day as her partner, who is having the same surgery. She will be driven to surgery by friend Natalie, who will also pick her up and stay with her during recovery as long as is needed. Pt has other friends who will help with grocery shopping, driving to postop appointments, and whatever other needs that may arise. Pt has a therapist who she sees regularly and has appointments scheduled with during the postop period.      Surgical instructions given to patient via phone.    Total time with patient: 50 minutes    Avery Iverson RN

## 2023-09-18 ENCOUNTER — MYC MEDICAL ADVICE (OUTPATIENT)
Dept: PLASTIC SURGERY | Facility: CLINIC | Age: 48
End: 2023-09-18

## 2023-09-18 ENCOUNTER — LAB (OUTPATIENT)
Dept: LAB | Facility: CLINIC | Age: 48
End: 2023-09-18
Payer: MEDICAID

## 2023-09-18 DIAGNOSIS — F64.9 GENDER DYSPHORIA: ICD-10-CM

## 2023-09-18 PROCEDURE — 87086 URINE CULTURE/COLONY COUNT: CPT

## 2023-09-18 PROCEDURE — 99000 SPECIMEN HANDLING OFFICE-LAB: CPT

## 2023-09-18 PROCEDURE — G0480 DRUG TEST DEF 1-7 CLASSES: HCPCS | Mod: 90

## 2023-09-20 LAB — BACTERIA UR CULT: NO GROWTH

## 2023-10-08 ENCOUNTER — ANESTHESIA EVENT (OUTPATIENT)
Dept: SURGERY | Facility: CLINIC | Age: 48
End: 2023-10-08
Payer: COMMERCIAL

## 2023-10-08 ASSESSMENT — ENCOUNTER SYMPTOMS: SEIZURES: 0

## 2023-10-09 ENCOUNTER — HOSPITAL ENCOUNTER (INPATIENT)
Facility: CLINIC | Age: 48
LOS: 2 days | Discharge: HOME | End: 2023-10-11
Attending: UROLOGY | Admitting: UROLOGY
Payer: COMMERCIAL

## 2023-10-09 ENCOUNTER — ANESTHESIA (OUTPATIENT)
Dept: SURGERY | Facility: CLINIC | Age: 48
End: 2023-10-09
Payer: COMMERCIAL

## 2023-10-09 DIAGNOSIS — Z98.890 POST-OPERATIVE STATE: ICD-10-CM

## 2023-10-09 DIAGNOSIS — F64.0 TRANSSEXUALISM: Primary | ICD-10-CM

## 2023-10-09 LAB — GLUCOSE BLDC GLUCOMTR-MCNC: 114 MG/DL (ref 70–99)

## 2023-10-09 PROCEDURE — 53410 RECONSTRUCTION OF URETHRA: CPT | Mod: KX | Performed by: UROLOGY

## 2023-10-09 PROCEDURE — 999N000141 HC STATISTIC PRE-PROCEDURE NURSING ASSESSMENT: Performed by: UROLOGY

## 2023-10-09 PROCEDURE — 250N000011 HC RX IP 250 OP 636: Performed by: UROLOGY

## 2023-10-09 PROCEDURE — 0VT50ZZ RESECTION OF SCROTUM, OPEN APPROACH: ICD-10-PCS | Performed by: UROLOGY

## 2023-10-09 PROCEDURE — 710N000010 HC RECOVERY PHASE 1, LEVEL 2, PER MIN: Performed by: UROLOGY

## 2023-10-09 PROCEDURE — 88302 TISSUE EXAM BY PATHOLOGIST: CPT | Mod: 26 | Performed by: PATHOLOGY

## 2023-10-09 PROCEDURE — 250N000009 HC RX 250: Performed by: NURSE ANESTHETIST, CERTIFIED REGISTERED

## 2023-10-09 PROCEDURE — 56805 CLITOROPLASTY INTERSEX STATE: CPT | Mod: KX | Performed by: UROLOGY

## 2023-10-09 PROCEDURE — 54125 REMOVAL OF PENIS: CPT | Mod: KX | Performed by: UROLOGY

## 2023-10-09 PROCEDURE — 88302 TISSUE EXAM BY PATHOLOGIST: CPT | Mod: TC | Performed by: UROLOGY

## 2023-10-09 PROCEDURE — 250N000025 HC SEVOFLURANE, PER MIN: Performed by: UROLOGY

## 2023-10-09 PROCEDURE — 0W4M070 CREATION OF VAGINA IN MALE PERINEUM WITH AUTOLOGOUS TISSUE SUBSTITUTE, OPEN APPROACH: ICD-10-PCS | Performed by: UROLOGY

## 2023-10-09 PROCEDURE — 370N000017 HC ANESTHESIA TECHNICAL FEE, PER MIN: Performed by: UROLOGY

## 2023-10-09 PROCEDURE — 55150 REMOVAL OF SCROTUM: CPT | Mod: KX | Performed by: UROLOGY

## 2023-10-09 PROCEDURE — 57292 CONSTRUCT VAGINA WITH GRAFT: CPT | Mod: KX | Performed by: UROLOGY

## 2023-10-09 PROCEDURE — 250N000009 HC RX 250: Performed by: UROLOGY

## 2023-10-09 PROCEDURE — 360N000078 HC SURGERY LEVEL 5, PER MIN: Performed by: UROLOGY

## 2023-10-09 PROCEDURE — 120N000002 HC R&B MED SURG/OB UMMC

## 2023-10-09 PROCEDURE — 250N000011 HC RX IP 250 OP 636: Performed by: NURSE ANESTHETIST, CERTIFIED REGISTERED

## 2023-10-09 PROCEDURE — 258N000003 HC RX IP 258 OP 636: Performed by: NURSE ANESTHETIST, CERTIFIED REGISTERED

## 2023-10-09 PROCEDURE — 272N000001 HC OR GENERAL SUPPLY STERILE: Performed by: UROLOGY

## 2023-10-09 PROCEDURE — 14301 TIS TRNFR ANY 30.1-60 SQ CM: CPT | Mod: XS | Performed by: UROLOGY

## 2023-10-09 PROCEDURE — 0VTS0ZZ RESECTION OF PENIS, OPEN APPROACH: ICD-10-PCS | Performed by: UROLOGY

## 2023-10-09 PROCEDURE — 250N000013 HC RX MED GY IP 250 OP 250 PS 637

## 2023-10-09 PROCEDURE — 250N000013 HC RX MED GY IP 250 OP 250 PS 637: Performed by: STUDENT IN AN ORGANIZED HEALTH CARE EDUCATION/TRAINING PROGRAM

## 2023-10-09 PROCEDURE — 250N000011 HC RX IP 250 OP 636: Performed by: STUDENT IN AN ORGANIZED HEALTH CARE EDUCATION/TRAINING PROGRAM

## 2023-10-09 PROCEDURE — 258N000003 HC RX IP 258 OP 636: Performed by: UROLOGY

## 2023-10-09 RX ORDER — ONDANSETRON 2 MG/ML
4 INJECTION INTRAMUSCULAR; INTRAVENOUS EVERY 6 HOURS PRN
Status: DISCONTINUED | OUTPATIENT
Start: 2023-10-09 | End: 2023-10-11 | Stop reason: HOSPADM

## 2023-10-09 RX ORDER — ESTRADIOL 2 MG/1
2 TABLET ORAL DAILY
Status: DISCONTINUED | OUTPATIENT
Start: 2023-10-09 | End: 2023-10-09

## 2023-10-09 RX ORDER — ACETAMINOPHEN 325 MG/1
650 TABLET ORAL EVERY 4 HOURS PRN
Status: DISCONTINUED | OUTPATIENT
Start: 2023-10-12 | End: 2023-10-11 | Stop reason: HOSPADM

## 2023-10-09 RX ORDER — POLYETHYLENE GLYCOL 3350 17 G/17G
17 POWDER, FOR SOLUTION ORAL DAILY
Status: DISCONTINUED | OUTPATIENT
Start: 2023-10-10 | End: 2023-10-11 | Stop reason: HOSPADM

## 2023-10-09 RX ORDER — IBUPROFEN 200 MG
200 TABLET ORAL EVERY 4 HOURS PRN
COMMUNITY
End: 2023-10-21

## 2023-10-09 RX ORDER — ONDANSETRON 2 MG/ML
INJECTION INTRAMUSCULAR; INTRAVENOUS PRN
Status: DISCONTINUED | OUTPATIENT
Start: 2023-10-09 | End: 2023-10-09

## 2023-10-09 RX ORDER — ACETAMINOPHEN 325 MG/1
325-650 TABLET ORAL EVERY 6 HOURS PRN
COMMUNITY
End: 2023-10-21

## 2023-10-09 RX ORDER — FENTANYL CITRATE 50 UG/ML
INJECTION, SOLUTION INTRAMUSCULAR; INTRAVENOUS PRN
Status: DISCONTINUED | OUTPATIENT
Start: 2023-10-09 | End: 2023-10-09

## 2023-10-09 RX ORDER — SODIUM CHLORIDE, SODIUM LACTATE, POTASSIUM CHLORIDE, CALCIUM CHLORIDE 600; 310; 30; 20 MG/100ML; MG/100ML; MG/100ML; MG/100ML
INJECTION, SOLUTION INTRAVENOUS CONTINUOUS
Status: DISCONTINUED | OUTPATIENT
Start: 2023-10-09 | End: 2023-10-09 | Stop reason: HOSPADM

## 2023-10-09 RX ORDER — HEPARIN SODIUM 5000 [USP'U]/.5ML
5000 INJECTION, SOLUTION INTRAVENOUS; SUBCUTANEOUS
Status: COMPLETED | OUTPATIENT
Start: 2023-10-09 | End: 2023-10-09

## 2023-10-09 RX ORDER — ACETAMINOPHEN 325 MG/1
975 TABLET ORAL EVERY 8 HOURS
Status: DISCONTINUED | OUTPATIENT
Start: 2023-10-09 | End: 2023-10-11 | Stop reason: HOSPADM

## 2023-10-09 RX ORDER — OXYCODONE HYDROCHLORIDE 5 MG/1
10 TABLET ORAL EVERY 4 HOURS PRN
Status: DISCONTINUED | OUTPATIENT
Start: 2023-10-09 | End: 2023-10-11 | Stop reason: HOSPADM

## 2023-10-09 RX ORDER — FENTANYL CITRATE 50 UG/ML
50 INJECTION, SOLUTION INTRAMUSCULAR; INTRAVENOUS EVERY 5 MIN PRN
Status: DISCONTINUED | OUTPATIENT
Start: 2023-10-09 | End: 2023-10-09 | Stop reason: HOSPADM

## 2023-10-09 RX ORDER — FENTANYL CITRATE 50 UG/ML
25 INJECTION, SOLUTION INTRAMUSCULAR; INTRAVENOUS EVERY 5 MIN PRN
Status: DISCONTINUED | OUTPATIENT
Start: 2023-10-09 | End: 2023-10-09 | Stop reason: HOSPADM

## 2023-10-09 RX ORDER — ACETAMINOPHEN 325 MG/1
975 TABLET ORAL ONCE
Status: COMPLETED | OUTPATIENT
Start: 2023-10-09 | End: 2023-10-09

## 2023-10-09 RX ORDER — ACETAMINOPHEN 325 MG/1
325-650 TABLET ORAL EVERY 6 HOURS PRN
Status: DISCONTINUED | OUTPATIENT
Start: 2023-10-09 | End: 2023-10-09

## 2023-10-09 RX ORDER — HYDROMORPHONE HYDROCHLORIDE 1 MG/ML
0.2 INJECTION, SOLUTION INTRAMUSCULAR; INTRAVENOUS; SUBCUTANEOUS
Status: DISCONTINUED | OUTPATIENT
Start: 2023-10-09 | End: 2023-10-11 | Stop reason: HOSPADM

## 2023-10-09 RX ORDER — SODIUM CHLORIDE, SODIUM LACTATE, POTASSIUM CHLORIDE, CALCIUM CHLORIDE 600; 310; 30; 20 MG/100ML; MG/100ML; MG/100ML; MG/100ML
INJECTION, SOLUTION INTRAVENOUS CONTINUOUS PRN
Status: DISCONTINUED | OUTPATIENT
Start: 2023-10-09 | End: 2023-10-09

## 2023-10-09 RX ORDER — ALBUTEROL SULFATE 90 UG/1
2 AEROSOL, METERED RESPIRATORY (INHALATION) EVERY 4 HOURS PRN
Status: DISCONTINUED | OUTPATIENT
Start: 2023-10-09 | End: 2023-10-11 | Stop reason: HOSPADM

## 2023-10-09 RX ORDER — CLINDAMYCIN PHOSPHATE 900 MG/50ML
900 INJECTION, SOLUTION INTRAVENOUS
Status: COMPLETED | OUTPATIENT
Start: 2023-10-09 | End: 2023-10-09

## 2023-10-09 RX ORDER — HYDROMORPHONE HYDROCHLORIDE 1 MG/ML
0.4 INJECTION, SOLUTION INTRAMUSCULAR; INTRAVENOUS; SUBCUTANEOUS EVERY 5 MIN PRN
Status: DISCONTINUED | OUTPATIENT
Start: 2023-10-09 | End: 2023-10-09 | Stop reason: HOSPADM

## 2023-10-09 RX ORDER — CALCIUM CARBONATE 500 MG/1
500 TABLET, CHEWABLE ORAL 4 TIMES DAILY PRN
Status: DISCONTINUED | OUTPATIENT
Start: 2023-10-09 | End: 2023-10-09

## 2023-10-09 RX ORDER — NALOXONE HYDROCHLORIDE 0.4 MG/ML
0.4 INJECTION, SOLUTION INTRAMUSCULAR; INTRAVENOUS; SUBCUTANEOUS
Status: DISCONTINUED | OUTPATIENT
Start: 2023-10-09 | End: 2023-10-11 | Stop reason: HOSPADM

## 2023-10-09 RX ORDER — CALCIUM CARBONATE 500 MG/1
500 TABLET, CHEWABLE ORAL 4 TIMES DAILY PRN
Status: DISCONTINUED | OUTPATIENT
Start: 2023-10-09 | End: 2023-10-11 | Stop reason: HOSPADM

## 2023-10-09 RX ORDER — BISACODYL 10 MG
10 SUPPOSITORY, RECTAL RECTAL DAILY PRN
Status: DISCONTINUED | OUTPATIENT
Start: 2023-10-09 | End: 2023-10-11 | Stop reason: HOSPADM

## 2023-10-09 RX ORDER — HYDROMORPHONE HYDROCHLORIDE 1 MG/ML
0.2 INJECTION, SOLUTION INTRAMUSCULAR; INTRAVENOUS; SUBCUTANEOUS EVERY 5 MIN PRN
Status: DISCONTINUED | OUTPATIENT
Start: 2023-10-09 | End: 2023-10-09 | Stop reason: HOSPADM

## 2023-10-09 RX ORDER — DEXAMETHASONE SODIUM PHOSPHATE 4 MG/ML
INJECTION, SOLUTION INTRA-ARTICULAR; INTRALESIONAL; INTRAMUSCULAR; INTRAVENOUS; SOFT TISSUE PRN
Status: DISCONTINUED | OUTPATIENT
Start: 2023-10-09 | End: 2023-10-09

## 2023-10-09 RX ORDER — LIDOCAINE HYDROCHLORIDE 20 MG/ML
INJECTION, SOLUTION INFILTRATION; PERINEURAL PRN
Status: DISCONTINUED | OUTPATIENT
Start: 2023-10-09 | End: 2023-10-09

## 2023-10-09 RX ORDER — NALOXONE HYDROCHLORIDE 0.4 MG/ML
0.2 INJECTION, SOLUTION INTRAMUSCULAR; INTRAVENOUS; SUBCUTANEOUS
Status: DISCONTINUED | OUTPATIENT
Start: 2023-10-09 | End: 2023-10-11 | Stop reason: HOSPADM

## 2023-10-09 RX ORDER — FLUTICASONE FUROATE AND VILANTEROL 100; 25 UG/1; UG/1
1 POWDER RESPIRATORY (INHALATION) DAILY
Status: DISCONTINUED | OUTPATIENT
Start: 2023-10-09 | End: 2023-10-11 | Stop reason: HOSPADM

## 2023-10-09 RX ORDER — BUPIVACAINE HYDROCHLORIDE AND EPINEPHRINE 5; 5 MG/ML; UG/ML
INJECTION, SOLUTION EPIDURAL; INTRACAUDAL; PERINEURAL PRN
Status: DISCONTINUED | OUTPATIENT
Start: 2023-10-09 | End: 2023-10-09 | Stop reason: HOSPADM

## 2023-10-09 RX ORDER — HYDROXYZINE HYDROCHLORIDE 25 MG/1
25 TABLET, FILM COATED ORAL EVERY 6 HOURS PRN
Status: DISCONTINUED | OUTPATIENT
Start: 2023-10-09 | End: 2023-10-11 | Stop reason: HOSPADM

## 2023-10-09 RX ORDER — LIDOCAINE 40 MG/G
CREAM TOPICAL
Status: DISCONTINUED | OUTPATIENT
Start: 2023-10-09 | End: 2023-10-11 | Stop reason: HOSPADM

## 2023-10-09 RX ORDER — ONDANSETRON 4 MG/1
4 TABLET, ORALLY DISINTEGRATING ORAL EVERY 6 HOURS PRN
Status: DISCONTINUED | OUTPATIENT
Start: 2023-10-09 | End: 2023-10-11 | Stop reason: HOSPADM

## 2023-10-09 RX ORDER — HYDROMORPHONE HYDROCHLORIDE 1 MG/ML
0.4 INJECTION, SOLUTION INTRAMUSCULAR; INTRAVENOUS; SUBCUTANEOUS
Status: DISCONTINUED | OUTPATIENT
Start: 2023-10-09 | End: 2023-10-11 | Stop reason: HOSPADM

## 2023-10-09 RX ORDER — EPHEDRINE SULFATE 50 MG/ML
INJECTION, SOLUTION INTRAMUSCULAR; INTRAVENOUS; SUBCUTANEOUS PRN
Status: DISCONTINUED | OUTPATIENT
Start: 2023-10-09 | End: 2023-10-09

## 2023-10-09 RX ORDER — PROCHLORPERAZINE MALEATE 10 MG
10 TABLET ORAL EVERY 6 HOURS PRN
Status: DISCONTINUED | OUTPATIENT
Start: 2023-10-09 | End: 2023-10-11 | Stop reason: HOSPADM

## 2023-10-09 RX ORDER — PROPOFOL 10 MG/ML
INJECTION, EMULSION INTRAVENOUS PRN
Status: DISCONTINUED | OUTPATIENT
Start: 2023-10-09 | End: 2023-10-09

## 2023-10-09 RX ORDER — CLINDAMYCIN PHOSPHATE 900 MG/50ML
900 INJECTION, SOLUTION INTRAVENOUS SEE ADMIN INSTRUCTIONS
Status: DISCONTINUED | OUTPATIENT
Start: 2023-10-09 | End: 2023-10-09 | Stop reason: HOSPADM

## 2023-10-09 RX ORDER — TOLTERODINE 4 MG/1
4 CAPSULE, EXTENDED RELEASE ORAL DAILY
Status: DISCONTINUED | OUTPATIENT
Start: 2023-10-09 | End: 2023-10-10

## 2023-10-09 RX ORDER — OXYCODONE HYDROCHLORIDE 5 MG/1
5 TABLET ORAL EVERY 4 HOURS PRN
Status: DISCONTINUED | OUTPATIENT
Start: 2023-10-09 | End: 2023-10-11 | Stop reason: HOSPADM

## 2023-10-09 RX ORDER — QUETIAPINE FUMARATE 25 MG/1
25 TABLET, FILM COATED ORAL 2 TIMES DAILY
Status: DISCONTINUED | OUTPATIENT
Start: 2023-10-09 | End: 2023-10-11 | Stop reason: HOSPADM

## 2023-10-09 RX ORDER — AMOXICILLIN 250 MG
1 CAPSULE ORAL 2 TIMES DAILY
Status: DISCONTINUED | OUTPATIENT
Start: 2023-10-09 | End: 2023-10-11 | Stop reason: HOSPADM

## 2023-10-09 RX ADMIN — FENTANYL CITRATE 100 MCG: 50 INJECTION INTRAMUSCULAR; INTRAVENOUS at 07:44

## 2023-10-09 RX ADMIN — SENNOSIDES AND DOCUSATE SODIUM 1 TABLET: 50; 8.6 TABLET ORAL at 19:50

## 2023-10-09 RX ADMIN — FENTANYL CITRATE 25 MCG: 50 INJECTION INTRAMUSCULAR; INTRAVENOUS at 11:39

## 2023-10-09 RX ADMIN — TOLTERODINE 4 MG: 4 CAPSULE, EXTENDED RELEASE ORAL at 14:39

## 2023-10-09 RX ADMIN — OXYCODONE HYDROCHLORIDE 5 MG: 5 TABLET ORAL at 11:58

## 2023-10-09 RX ADMIN — GENTAMICIN SULFATE 240 MG: 40 INJECTION, SOLUTION INTRAMUSCULAR; INTRAVENOUS at 06:39

## 2023-10-09 RX ADMIN — ACETAMINOPHEN 975 MG: 325 TABLET, FILM COATED ORAL at 06:08

## 2023-10-09 RX ADMIN — HYDROMORPHONE HYDROCHLORIDE 0.4 MG: 1 INJECTION, SOLUTION INTRAMUSCULAR; INTRAVENOUS; SUBCUTANEOUS at 12:50

## 2023-10-09 RX ADMIN — DEXAMETHASONE SODIUM PHOSPHATE 4 MG: 4 INJECTION, SOLUTION INTRA-ARTICULAR; INTRALESIONAL; INTRAMUSCULAR; INTRAVENOUS; SOFT TISSUE at 08:10

## 2023-10-09 RX ADMIN — ONDANSETRON 4 MG: 2 INJECTION INTRAMUSCULAR; INTRAVENOUS at 10:51

## 2023-10-09 RX ADMIN — LIDOCAINE HYDROCHLORIDE 80 MG: 20 INJECTION, SOLUTION INFILTRATION; PERINEURAL at 07:44

## 2023-10-09 RX ADMIN — ESTRADIOL 2 MG: 2 TABLET ORAL at 17:01

## 2023-10-09 RX ADMIN — PHENYLEPHRINE HYDROCHLORIDE 100 MCG: 10 INJECTION INTRAVENOUS at 09:26

## 2023-10-09 RX ADMIN — CLINDAMYCIN PHOSPHATE 900 MG: 900 INJECTION, SOLUTION INTRAVENOUS at 07:55

## 2023-10-09 RX ADMIN — ACETAMINOPHEN 975 MG: 325 TABLET, FILM COATED ORAL at 22:36

## 2023-10-09 RX ADMIN — SODIUM CHLORIDE, POTASSIUM CHLORIDE, SODIUM LACTATE AND CALCIUM CHLORIDE: 600; 310; 30; 20 INJECTION, SOLUTION INTRAVENOUS at 09:20

## 2023-10-09 RX ADMIN — PHENYLEPHRINE HYDROCHLORIDE 100 MCG: 10 INJECTION INTRAVENOUS at 07:57

## 2023-10-09 RX ADMIN — MIDAZOLAM 2 MG: 1 INJECTION INTRAMUSCULAR; INTRAVENOUS at 07:27

## 2023-10-09 RX ADMIN — HEPARIN SODIUM 5000 UNITS: 5000 INJECTION, SOLUTION INTRAVENOUS; SUBCUTANEOUS at 06:09

## 2023-10-09 RX ADMIN — ACETAMINOPHEN 975 MG: 325 TABLET, FILM COATED ORAL at 14:39

## 2023-10-09 RX ADMIN — PHENYLEPHRINE HYDROCHLORIDE 100 MCG: 10 INJECTION INTRAVENOUS at 09:22

## 2023-10-09 RX ADMIN — PHENYLEPHRINE HYDROCHLORIDE 0.2 MCG/KG/MIN: 10 INJECTION INTRAVENOUS at 09:46

## 2023-10-09 RX ADMIN — Medication 50 MG: at 07:48

## 2023-10-09 RX ADMIN — Medication 5 MG: at 09:51

## 2023-10-09 RX ADMIN — SUGAMMADEX 155 MG: 100 INJECTION, SOLUTION INTRAVENOUS at 10:55

## 2023-10-09 RX ADMIN — PHENYLEPHRINE HYDROCHLORIDE 100 MCG: 10 INJECTION INTRAVENOUS at 09:03

## 2023-10-09 RX ADMIN — PHENYLEPHRINE HYDROCHLORIDE 100 MCG: 10 INJECTION INTRAVENOUS at 08:12

## 2023-10-09 RX ADMIN — FLUTICASONE FUROATE AND VILANTEROL TRIFENATATE 1 PUFF: 100; 25 POWDER RESPIRATORY (INHALATION) at 19:53

## 2023-10-09 RX ADMIN — FENTANYL CITRATE 25 MCG: 50 INJECTION INTRAMUSCULAR; INTRAVENOUS at 10:04

## 2023-10-09 RX ADMIN — SODIUM CHLORIDE, POTASSIUM CHLORIDE, SODIUM LACTATE AND CALCIUM CHLORIDE: 600; 310; 30; 20 INJECTION, SOLUTION INTRAVENOUS at 07:27

## 2023-10-09 RX ADMIN — OXYCODONE HYDROCHLORIDE 5 MG: 5 TABLET ORAL at 16:56

## 2023-10-09 RX ADMIN — QUETIAPINE FUMARATE 25 MG: 25 TABLET ORAL at 19:50

## 2023-10-09 RX ADMIN — PROPOFOL 150 MG: 10 INJECTION, EMULSION INTRAVENOUS at 07:47

## 2023-10-09 RX ADMIN — PHENYLEPHRINE HYDROCHLORIDE 100 MCG: 10 INJECTION INTRAVENOUS at 08:02

## 2023-10-09 ASSESSMENT — ACTIVITIES OF DAILY LIVING (ADL)
ADLS_ACUITY_SCORE: 22
ADLS_ACUITY_SCORE: 22
ADLS_ACUITY_SCORE: 20
ADLS_ACUITY_SCORE: 22
ADLS_ACUITY_SCORE: 20
ADLS_ACUITY_SCORE: 20
ADLS_ACUITY_SCORE: 22

## 2023-10-09 ASSESSMENT — ENCOUNTER SYMPTOMS: ORTHOPNEA: 0

## 2023-10-09 ASSESSMENT — COPD QUESTIONNAIRES
CAT_SEVERITY: MILD
COPD: 1

## 2023-10-09 ASSESSMENT — LIFESTYLE VARIABLES: TOBACCO_USE: 1

## 2023-10-09 NOTE — ANESTHESIA POSTPROCEDURE EVALUATION
Patient: Madi Padilla    Procedure: Procedure(s):  Minimal Depth Vaginoplasty       Anesthesia Type:  General    Note:  Disposition: Inpatient   Postop Pain Control: Uneventful            Sign Out: Well controlled pain   PONV: No   Neuro/Psych: Uneventful            Sign Out: Acceptable/Baseline neuro status   Airway/Respiratory: Uneventful            Sign Out: Acceptable/Baseline resp. status   CV/Hemodynamics: Uneventful            Sign Out: Acceptable CV status; No obvious hypovolemia; No obvious fluid overload   Other NRE: NONE   DID A NON-ROUTINE EVENT OCCUR? No           Last vitals:  Vitals Value Taken Time   BP 99/55 10/09/23 1230   Temp 36.6  C (97.9  F) 10/09/23 1215   Pulse 83 10/09/23 1237   Resp 10 10/09/23 1237   SpO2 96 % 10/09/23 1237   Vitals shown include unvalidated device data.    Electronically Signed By: Corinna Giles MD  October 9, 2023  12:38 PM

## 2023-10-09 NOTE — OR NURSING
PACU to Inpatient Nursing Handoff    Patient Madi Padilla is a 48 year old adult who speaks English.   Procedure Procedure(s):  Minimal Depth Vaginoplasty   Surgeon(s) Primary: Golden Lucero MD  Assisting: Luca Herron MD; Jean Haney MD     Allergies   Allergen Reactions    Eggshell Membrane (Chicken) [Egg Shells]     Penicillins Swelling    Seafood Angioedema       Isolation  [unfilled]     Past Medical History   has a past medical history of COPD without exacerbation (H) and Mild persistent asthma without complication (9/12/2023).    Anesthesia General   Dermatome Level     Preop Meds acetaminophen (Tylenol) - time given: Tylenol 975 mg 0825  Heparin - time given: 0609   Nerve block Not applicable   Intraop Meds dexamethasone (Decadron)  dexmedetomidine (Precedex): 12 mcg total  fentanyl (Sublimaze): 125 mcg total  ondansetron (Zofran): last given at 4  Phenylephrine  Versed 2 mg   Local Meds Yes   Antibiotics clindamycin (Cleocin) - last given at 0900  gentamicin (Garamycin) - last given at 0639     Pain Patient Currently in Pain: denies   PACU meds  fentanyl (Sublimaze): 25 mcg (total dose) last given at 1139   oxycodone (Roxicodone): 5 mg (total dose) last given at 1158    PCA / epidural No   Capnography     Telemetry ECG Rhythm: Normal sinus rhythm   Inpatient Telemetry Monitor Ordered? No        Labs Glucose Lab Results   Component Value Date     10/09/2023     05/12/2022       Hgb Lab Results   Component Value Date    HGB 14.3 09/12/2023       INR No results found for: INR   PACU Imaging Not applicable     Wound/Incision Incision/Surgical Site 10/09/23 Perineum (Active)   Incision Assessment UTV 10/09/23 1106   Emeli-Incision Assessment UTV 10/09/23 1106   Incision Drainage Amount None 10/09/23 1106   Number of days: 0       Incision/Surgical Site 10/09/23 Bilateral Perineum (Active)   Incision Assessment UTV 10/09/23 1106   Emeli-Incision Assessment UT 10/09/23 1106   Closure  Sutures 10/09/23 1046   Incision Drainage Amount None 10/09/23 1106   Dressing Intervention Clean, dry, intact;New dressing applied 10/09/23 1046   Number of days: 0      CMS        Equipment Not applicable   Other LDA       IV Access Peripheral IV 10/09/23 Left Wrist (Active)   Number of days: 0      Blood Products Not applicable  mL   Intake/Output Date 10/09/23 0700 - 10/10/23 0659   Shift 7789-6646 1918-6252 4777-5368 24 Hour Total   INTAKE   I.V. 1450   1450   Shift Total(mL/kg) 1450(20.48)   1450(20.48)   OUTPUT   Blood 250   250   Shift Total(mL/kg) 250(3.53)   250(3.53)   Weight (kg) 70.8 70.8 70.8 70.8      Drains / Horton Urethral Catheter 10/09/23 Latex 16 fr (Active)   Tube Description UTV 10/09/23 1106   Catheter Care Done 10/09/23 1106   Collection Container Patent 10/09/23 1106   Securement Method Securing device (Describe) 10/09/23 1106   Rationale for Continued Use Wound Healing;/GI/GYN Pelvic Procedure 10/09/23 1106   Number of days: 0      Time of void PreOp Time of Void Prior to Procedure: 0530 (10/09/23 0605)    PostOp      Diapered? No   Bladder Scan     PO    tolerating sips     Vitals    B/P: 104/81  T: 98.1  F (36.7  C)    Temp src: Axillary  P:  Pulse: 98 (10/09/23 1115)          R: (!) 9  O2:  SpO2: 100 %    O2 Device: None (Room air) (10/09/23 1125)    Oxygen Delivery: 5 LPM (10/09/23 1106)         Family/support present significant other and Zadie    Patient belongings     Patient transported on cart   DC meds/scripts (obs/outpt) Not applicable   Inpatient Pain Meds Released? Yes       Special needs/considerations None   Tasks needing completion None       Alyssa Smith, RN  [facility] [id number]

## 2023-10-09 NOTE — ANESTHESIA PREPROCEDURE EVALUATION
Anesthesia Pre-Procedure Evaluation    Patient: Madi Padilla   MRN: [id number] : 1975        Procedure : Procedure(s):  Minimal Depth Vaginoplasty          Past Medical History:   Diagnosis Date    COPD without exacerbation (H)     Mild persistent asthma without complication 2023      Past Surgical History:   Procedure Laterality Date    EYE MUSCLE SURGERY      ORCHIECTOMY SCROTAL BILATERAL      TOOTH EXTRACTION      VASECTOMY        Allergies   Allergen Reactions    Penicillins Swelling    Seafood Angioedema      Social History     Tobacco Use    Smoking status: Former     Packs/day: 0.50     Years: 10.00     Pack years: 5.00     Types: Cigarettes     Start date: 2/3/1980     Quit date: 2022     Years since quittin.6    Smokeless tobacco: Never   Substance Use Topics    Alcohol use: Not Currently      Wt Readings from Last 1 Encounters:   23 72.6 kg (160 lb)        Anesthesia Evaluation   Pt has had prior anesthetic. Type: General.    No history of anesthetic complications       ROS/MED HX  ENT/Pulmonary: Comment: Chronic bronchitis    (+)                tobacco use (quit 3 months ago), Past use,   Mild Persistent, asthma  Treatment: Inhaled steroids and Inhaler daily,  mild,  COPD,              Neurologic:  - neg neurologic ROS  (-) no seizures and no CVA   Cardiovascular:  - neg cardiovascular ROS  (-) hypertension, [place], orthopnea/PND and irregular heartbeat/palpitations   METS/Exercise Tolerance: >4 METS    Hematologic:  - neg hematologic  ROS     Musculoskeletal:  - neg musculoskeletal ROS     GI/Hepatic:  - neg GI/hepatic ROS  (-) GERD   Renal/Genitourinary:  - neg Renal ROS     Endo:  - neg endo ROS  (-) Type II DM   Psychiatric/Substance Use:     (+) psychiatric history bipolar       Infectious Disease:  - neg infectious disease ROS     Malignancy:  - neg malignancy ROS     Other:            Physical Exam    Airway        Mallampati: II   TM distance: > 3 FB   Neck ROM: full    Mouth opening: > 3 cm    Respiratory Devices and Support         Dental       (+) Multiple visibly decayed, broken teeth    B=Bridge, C=Chipped, L=Loose, M=Missing    Cardiovascular   cardiovascular exam normal       Rhythm and rate: regular and normal     Pulmonary   pulmonary exam normal        breath sounds clear to auscultation           OUTSIDE LABS:  CBC:   Lab Results   Component Value Date    WBC 8.0 09/12/2023    WBC 12.2 (H) 01/31/2023    HGB 14.3 09/12/2023    HGB 13.3 01/31/2023    HCT 43.3 09/12/2023    HCT 41.1 01/31/2023     09/12/2023     01/31/2023     BMP:   Lab Results   Component Value Date     09/12/2023     01/31/2023    POTASSIUM 4.3 09/12/2023    POTASSIUM 4.8 01/31/2023    CHLORIDE 100 09/12/2023    CHLORIDE 102 01/31/2023    CO2 27 09/12/2023    CO2 28 01/31/2023    BUN 10.0 09/12/2023    BUN 8.4 01/31/2023    CR 0.75 09/12/2023    CR 0.83 01/31/2023    GLC 94 09/12/2023     (H) 01/31/2023     COAGS: No results found for: PTT, INR, FIBR  POC: No results found for: BGM, HCG, HCGS  HEPATIC:   Lab Results   Component Value Date    ALBUMIN 3.8 05/12/2022    PROTTOTAL 7.0 05/12/2022    ALT 15 05/12/2022    AST 21 05/12/2022    ALKPHOS 55 05/12/2022    BILITOTAL 0.5 05/12/2022     OTHER:   Lab Results   Component Value Date    NADIRA 9.9 09/12/2023       Anesthesia Plan    ASA Status:  2    NPO Status:  NPO Appropriate    Anesthesia Type: General.     - Airway: ETT   Induction: Intravenous, Propofol.   Maintenance: Balanced.   Techniques and Equipment:     - Lines/Monitors: BIS     Consents    Anesthesia Plan(s) and associated risks, benefits, and realistic alternatives discussed. Questions answered and patient/representative(s) expressed understanding.     - Discussed: Risks, Benefits and Alternatives for BOTH SEDATION and the PROCEDURE were discussed     - Discussed with:  Patient       - Patient is DNR/DNI Status: No     Use of blood products discussed: Yes.      - Discussed with: Patient.     - Consented: consented to blood products            Reason for refusal: other.     Postoperative Care    Pain management: IV analgesics, Oral pain medications, Multi-modal analgesia.   PONV prophylaxis: Ondansetron (or other 5HT-3), Dexamethasone or Solumedrol     Comments:    Other Comments: 47 yo female PMHx mild persistent asthma, COPD, previous tobacco use, chronic bronchitis and bipolar 2 disorder presenting for minimal depth vaginoplasty. NPO appropriate, plan GETA + standard ASA monitors and BIS. Risks and benefits of anesthesia/procedure explained including but not limited to allergic reaction, need for invasive airway, somnolence, delirium, vocal cord/dental trauma, nausea/vomiting, arrhythmia, stroke, bleeding, need for blood transfusion, myocardial infarction, and death.               Corinna Giles MD

## 2023-10-09 NOTE — INTERVAL H&P NOTE
"I have reviewed the surgical (or preoperative) H&P that is linked to this encounter, and examined the patient. There are no significant changes    Clinical Conditions Present on Arrival:  Clinically Significant Risk Factors Present on Admission                  # Overweight: Estimated body mass index is 25.21 kg/m  as calculated from the following:    Height as of this encounter: 1.676 m (5' 5.98\").    Weight as of this encounter: 70.8 kg (156 lb 1.4 oz).       "

## 2023-10-09 NOTE — OP NOTE
OPERATIVE REPORT     PREOPERATIVE DIAGNOSIS: Gender Dysphoria  POSTOPERATIVE DIAGNOSIS: Gender Dysphoria  DATE OF SURGERY: Oct 9, 2023     PROCEDURE:   Penile Inversion Minimal Depth Vaginoplasty which includes:  1. Clitoroplasty   2. Total Penectomy  3. Urethroplasty   4. Construction of Minimal Depth Artificial Vagina   5. Scrotectomy  6. Bilateral labiaplasty via adjacent tissue transfer of scrotal and mons skin flaps, size 12 x 5 cm on left and 12 x 5 cm on right.       SURGEON: Golden Lucero MD  FELLOW: Jean Haney MD  RESIDENT: Saad Herron MD  ANESTHESIA: General  EBL: 250cc  DRAINS: 16 Fr Horton, Small Packing in Vagina       INDICATIONS:   Kim Tejada is a 48 year old year old transgender female with gender dysphoria. She meets WPATH guidelines for gender affirming surgery. She elects for minimal depth vaginoplasty. She did have prior orchiectomy. The risks, benefits and alternatives of the multiple treatment options were discussed with her and she elected to proceed. Specifically, this included, but was not limited to: wound infection, anesthesia risks, blood clots, urethral stricture, inadequate vaginal depth, and rectal injury.      DESCRIPTION OF PROCEDURE: After informed consent was obtained and preoperative antibiotics were given, the patient was taken to the operating room, placed supine on the operating table. SCDs and preoperative subcutaneous heparin were utilized for VTE prophylaxis. General anesthesia was induced and she was intubated. The patient was placed in lithotomy. The genitalia and lower abdomen were prepped and draped in a sterile fashion. A timeout was performed.     We then made a circumcising skin incision with care to leave a mucosal collar beneath the glans. This was dissected down to Hernandez's fascia. The penis was degloved, which left a circumferential penile skin tube.     We made a midline scrotal incision, which was taken down to the level of the urethra and the penis was  brought through this.     We then turned our attention to the glans in order to perform a clitoroplasty. A W-shaped incision was marked along the glans to create the clitoris. Bovie cautery was used to delineate the lateral borders of the neurovascular bundle along the penile shaft. A combination of blunt and sharp dissection with tenotomy scissors was used to dissect the penile glans and its neurovascular bundle. Dissection was performed along the under surface of the tunica albuginea - freeing the corporal spongy tissue. This ensured that the neurovascular bundle was left unharmed. The neoclitoris was then shaped and sutured in a narrow tubular stricture and the mucosal collar was shaped to form a clitoral green.      After clitoroplasty, the anterior urethra was freed from the corporal bodies. Circumferential mobilization was performed to the distal bulbar urethra using sharp dissection.       A 16 Fr urethral Horton catheter was placed. We then dissected the cavernosal bodies to the level of the pubic symphysis and then amputated the corporal bodies bilaterally at this level. The specimen was then passed off to pathology. The stumps were closed with running PDS suture.      Midline dissection was used to identify the corpus spongiosum and the bulbospongiosus muscle.       Dissection was continued around the bulbospongiosus muscle to identify the bulbospongiosus muscles. The bulbospongiosus muscle was sharply taken off the bulbar urethra and laterally, and left intact posteriorly. At this point, sharp dissection was continued along the base of the bulbar urethra. The central tendon was released, which allowed for urethral mobility.       Using sharp dissection a space was developed between the urethra and prostate anteriorly and the rectum posteriorly. We stopped after division of the central tendon and a vaginal canal without significant depth.       The neurovascular bundle was then folded such that the  neoclitoris was positioned on the pubis. The clitoris was tacked in position using absorbable suture.      We then turned our attention to completing the urethroplasty. The urethra was transected at the level of the distal bulbar urethra. The urethra was spatulated for about 4cm ventrally, allowing the dorsal aspect to be sewn to the neoclitoral and clitoral green skin dorsally using 3-0 Vicryl interrupted suture - thus creating a mucosal, lubricated inner vulvar vestibule.  The bulbar urethra was debulked and oversewn. The urethral spatulation ended at the distal bulbar urethra. The edges of the urethrotomy was oversewn to decrease bleeding and thompson the mucosa.       We then turned our attention to creation of a midline opening superior to the penile skin tube. This midline opening would later become the opening for the clitoris and urethra. Based on this location, I marked out the areas of scrotum that would need to be resected. I then performed excision of the excess scrotal tissue. These were two separate segments.      A small portion of the vaginal tube was excised to create an appropriate sized penile skin flap for the small, minimal depth canal. The tip of the vaginal tube was closed using absorbable suture and sutured into place at the depth of our vaginal dissection with multiple absorbable interrupted sutures. We placed PDS sutures to tack the skin tube to its appropriate location     I designed bilateral labia majora using anteriorly perfused scrotal and mons skin. These labial flaps were 12x5cm per side. These were fashioned to create a feminine appearing labia. They were advanced posteriorly and sutured into place using 3-0 Vicryl and Monocryl sutures in two layers.     The ventral urethra was then sutured to the posterior aspect of the superiorly placed opening to complete the urethroplasty using Vicryl suture. There was excellent urethral mucosal eversion with pink mucosa evident.    We performed  clitoral hooding and labia minora creation with 3-0 PDS in a running horizontal manner to create folds of skin medial to the labia majora bilaterally.      Xeroform packing was placed in the vagina. All counts were correct at the end of the case.  The clitoris was pink and viable at the end of the case.  The Horton was intact and draining clear urine.  A pressure dressing was applied and sutured in place using Prolene sutures.  The patient was then extubated, awakened, and transferred to the postop area in stable condition.      Plan:  -admit to urology  -reveal POD2  -ancef in house  -daily labs     Saad Herron MD  PGY2  Urology        As attending surgeon, I, Golden Lucero MD, was scrubbed and present for the entire procedure.

## 2023-10-09 NOTE — PLAN OF CARE
Goal Outcome Evaluation:       Pt doing well, Oxy. Controlling pain.  Tolerating reg diet, dowling patent, vaginal dressing C/D/I.  Will cont to monitor post op status and medicate prn.

## 2023-10-09 NOTE — ANESTHESIA PROCEDURE NOTES
Airway       Patient location during procedure: OR       Procedure Start/Stop Times: 10/9/2023 7:49 AM  Staff -        CRNA: Rafal Howe APRN CRNA       Performed By: CRNAIndications and Patient Condition       Indications for airway management: sary-procedural       Induction type:intravenous       Mask difficulty assessment: 1 - vent by mask    Final Airway Details       Final airway type: endotracheal airway       Successful airway: ETT - single  Endotracheal Airway Details        ETT size (mm): 7.5       Cuffed: yes       Inital cuff pressure (cm H2O): 20       Successful intubation technique: direct laryngoscopy       DL Blade Type: MAC 3       Grade View of Cords: 1       Adjucts: stylet       Position: Right       Measured from: lips       Secured at (cm): 23       Bite block used: None    Post intubation assessment        Placement verified by: capnometry, equal breath sounds and chest rise        Number of attempts at approach: 1       Number of other approaches attempted: 0       Secured with: silk tape       Ease of procedure: easy       Dentition: Intact and Unchanged    Medication(s) Administered   Medication Administration Time: 10/9/2023 7:49 AM

## 2023-10-09 NOTE — ANESTHESIA CARE TRANSFER NOTE
Patient: Madi Padilla    Procedure: Procedure(s):  Minimal Depth Vaginoplasty       Diagnosis: Gender dysphoria [F64.9]  Diagnosis Additional Information: No value filed.    Anesthesia Type:   General     Note:    Oropharynx: oropharynx clear of all foreign objects  Level of Consciousness: drowsy  Oxygen Supplementation: face mask  Level of Supplemental Oxygen (L/min / FiO2): 6  Independent Airway: airway patency satisfactory and stable  Dentition: dentition unchanged  Vital Signs Stable: post-procedure vital signs reviewed and stable  Report to RN Given: handoff report given  Patient transferred to: PACU    Handoff Report: Identifed the Patient, Identified the Reponsible Provider, Reviewed the pertinent medical history, Discussed the surgical course, Reviewed Intra-OP anesthesia mangement and issues during anesthesia, Set expectations for post-procedure period and Allowed opportunity for questions and acknowledgement of understanding      Vitals:  Vitals Value Taken Time   /80    Temp 98.1    Pulse 88    Resp 26 10/09/23 1106   SpO2 100 % 10/09/23 1106   Vitals shown include unvalidated device data.    Electronically Signed By: NIKKI Fried CRNA  October 9, 2023  11:07 AM

## 2023-10-09 NOTE — PLAN OF CARE
Goal Outcome Evaluation: Initiated       Plan of Care Reviewed With: patient    Overall Patient Progress: improvingOverall Patient Progress: improving    Focus: Post op admit  D: Alert and orientated times four. Afebrile, vss, on room air and sating well. Horton patent. I: Orientated to room and call light system. P: Plan of care initiated.

## 2023-10-10 ENCOUNTER — APPOINTMENT (OUTPATIENT)
Dept: PHYSICAL THERAPY | Facility: CLINIC | Age: 48
End: 2023-10-10
Attending: UROLOGY
Payer: COMMERCIAL

## 2023-10-10 LAB
ANION GAP SERPL CALCULATED.3IONS-SCNC: 8 MMOL/L (ref 7–15)
BASO+EOS+MONOS # BLD AUTO: ABNORMAL 10*3/UL
BASO+EOS+MONOS NFR BLD AUTO: ABNORMAL %
BASOPHILS # BLD AUTO: 0 10E3/UL (ref 0–0.2)
BASOPHILS NFR BLD AUTO: 0 %
BUN SERPL-MCNC: 13.2 MG/DL (ref 6–20)
CALCIUM SERPL-MCNC: 8.8 MG/DL (ref 8.6–10)
CHLORIDE SERPL-SCNC: 102 MMOL/L (ref 98–107)
CREAT SERPL-MCNC: 0.73 MG/DL (ref 0.51–1.17)
DEPRECATED HCO3 PLAS-SCNC: 28 MMOL/L (ref 22–29)
EGFRCR SERPLBLD CKD-EPI 2021: >90 ML/MIN/1.73M2
EOSINOPHIL # BLD AUTO: 0.1 10E3/UL (ref 0–0.7)
EOSINOPHIL NFR BLD AUTO: 1 %
ERYTHROCYTE [DISTWIDTH] IN BLOOD BY AUTOMATED COUNT: 13 % (ref 10–15)
GLUCOSE SERPL-MCNC: 103 MG/DL (ref 70–99)
HCT VFR BLD AUTO: 33 % (ref 35–53)
HGB BLD-MCNC: 10.8 G/DL (ref 11.7–17.7)
IMM GRANULOCYTES # BLD: 0 10E3/UL
IMM GRANULOCYTES NFR BLD: 0 %
LYMPHOCYTES # BLD AUTO: 2.4 10E3/UL (ref 0.8–5.3)
LYMPHOCYTES NFR BLD AUTO: 28 %
MCH RBC QN AUTO: 31.8 PG (ref 26.5–33)
MCHC RBC AUTO-ENTMCNC: 32.7 G/DL (ref 31.5–36.5)
MCV RBC AUTO: 97 FL (ref 78–100)
MONOCYTES # BLD AUTO: 0.9 10E3/UL (ref 0–1.3)
MONOCYTES NFR BLD AUTO: 10 %
NEUTROPHILS # BLD AUTO: 5.2 10E3/UL (ref 1.6–8.3)
NEUTROPHILS NFR BLD AUTO: 61 %
NRBC # BLD AUTO: 0 10E3/UL
NRBC BLD AUTO-RTO: 0 /100
PLATELET # BLD AUTO: 179 10E3/UL (ref 150–450)
POTASSIUM SERPL-SCNC: 3.8 MMOL/L (ref 3.4–5.3)
RBC # BLD AUTO: 3.4 10E6/UL (ref 3.8–5.9)
SODIUM SERPL-SCNC: 138 MMOL/L (ref 135–145)
WBC # BLD AUTO: 8.7 10E3/UL (ref 4–11)

## 2023-10-10 PROCEDURE — 250N000013 HC RX MED GY IP 250 OP 250 PS 637

## 2023-10-10 PROCEDURE — 97116 GAIT TRAINING THERAPY: CPT | Mod: GP

## 2023-10-10 PROCEDURE — 97530 THERAPEUTIC ACTIVITIES: CPT | Mod: GP

## 2023-10-10 PROCEDURE — 120N000002 HC R&B MED SURG/OB UMMC

## 2023-10-10 PROCEDURE — 85025 COMPLETE CBC W/AUTO DIFF WBC: CPT

## 2023-10-10 PROCEDURE — 36415 COLL VENOUS BLD VENIPUNCTURE: CPT

## 2023-10-10 PROCEDURE — 80048 BASIC METABOLIC PNL TOTAL CA: CPT

## 2023-10-10 PROCEDURE — 97161 PT EVAL LOW COMPLEX 20 MIN: CPT | Mod: GP

## 2023-10-10 RX ADMIN — POLYETHYLENE GLYCOL 3350 17 G: 17 POWDER, FOR SOLUTION ORAL at 08:24

## 2023-10-10 RX ADMIN — SENNOSIDES AND DOCUSATE SODIUM 1 TABLET: 50; 8.6 TABLET ORAL at 08:24

## 2023-10-10 RX ADMIN — UMECLIDINIUM 1 PUFF: 62.5 AEROSOL, POWDER ORAL at 08:24

## 2023-10-10 RX ADMIN — TOLTERODINE 4 MG: 4 CAPSULE, EXTENDED RELEASE ORAL at 08:25

## 2023-10-10 RX ADMIN — QUETIAPINE FUMARATE 25 MG: 25 TABLET ORAL at 08:25

## 2023-10-10 RX ADMIN — FLUTICASONE FUROATE AND VILANTEROL TRIFENATATE 1 PUFF: 100; 25 POWDER RESPIRATORY (INHALATION) at 08:24

## 2023-10-10 RX ADMIN — ACETAMINOPHEN 975 MG: 325 TABLET, FILM COATED ORAL at 14:57

## 2023-10-10 RX ADMIN — ACETAMINOPHEN 975 MG: 325 TABLET, FILM COATED ORAL at 22:42

## 2023-10-10 RX ADMIN — QUETIAPINE FUMARATE 25 MG: 25 TABLET ORAL at 20:19

## 2023-10-10 RX ADMIN — ACETAMINOPHEN 975 MG: 325 TABLET, FILM COATED ORAL at 06:12

## 2023-10-10 RX ADMIN — OXYCODONE HYDROCHLORIDE 10 MG: 5 TABLET ORAL at 20:19

## 2023-10-10 RX ADMIN — SENNOSIDES AND DOCUSATE SODIUM 1 TABLET: 50; 8.6 TABLET ORAL at 20:19

## 2023-10-10 ASSESSMENT — ACTIVITIES OF DAILY LIVING (ADL)
ADLS_ACUITY_SCORE: 22

## 2023-10-10 NOTE — PROGRESS NOTES
Patient is alert and oriented x 4, pain managed with PRN Oxycodone and scheduled Tylenol. Pt will call appropriately. Patient had soft BP at the end of shift, all other vitals are stable. Patent denies chest pain,SOB, Nausea and vomiting. Horton cath patent and draining well. L PIV saline lock. Will continue per POC.

## 2023-10-10 NOTE — PROGRESS NOTES
"   10/10/23 1400   Appointment Info   Signing Clinician's Name / Credentials (PT) Brittaney Jean DPMARILYN   Living Environment   People in Home significant other   Current Living Arrangements mobile home   Home Accessibility no concerns   Self-Care   Usual Activity Tolerance good   Current Activity Tolerance moderate   Regular Exercise No   Equipment Currently Used at Home cane, straight   Fall history within last six months no   Activity/Exercise/Self-Care Comment IND at baseline, occasionally uses SEC. Will be discharging home with significant other who is also having vaginoplasty this admission.   General Information   Onset of Illness/Injury or Date of Surgery 10/09/23   Referring Physician 48 year old y/o adult POD#1 s/p minimal depth vaginoplasty for gender dysphoria.   Pertinent History of Current Problem (include personal factors and/or comorbidities that impact the POC) per chart review, \"48 year old y/o adult POD#1 s/p minimal depth vaginoplasty for gender dysphoria\"   Existing Precautions/Restrictions fall   Cognition   Affect/Mental Status (Cognition) WNL   Orientation Status (Cognition) oriented x 4   Follows Commands (Cognition) WNL   Pain Assessment   Patient Currently in Pain Yes, see Vital Sign flowsheet   Integumentary/Edema   Integumentary/Edema Comments no edema in B LE   Posture    Posture Not impaired   Range of Motion (ROM)   ROM Comment WNL   Strength (Manual Muscle Testing)   Strength Comments B LE strong 5/5   Bed Mobility   Comment, (Bed Mobility) needs vc for log roll, but up SBA with rail use and HOB elevated   Transfers   Comment, (Transfers) [place] STS to FWW   Gait/Stairs (Locomotion)   Comment, (Gait/Stairs) pt ambulates with SBA in room, hunched posture, wide MARTHA   Balance   Balance Comments steady on feet, able to stand and perform dynamic tasks without UE support   Sensory Examination   Sensory Perception patient reports no sensory changes   Clinical Impression   Criteria for Skilled " Therapeutic Intervention Yes, treatment indicated   PT Diagnosis (PT) impaired functional mobility   Influenced by the following impairments pain, weakness, post op restrictions   Functional limitations due to impairments bed mobility, gait, transfers   Clinical Presentation (PT Evaluation Complexity) Stable/Uncomplicated   Clinical Presentation Rationale PMH, clinician impression   Clinical Decision Making (Complexity) low complexity   Planned Therapy Interventions (PT) bed mobility training;gait training;home program guidelines;transfer training   Anticipated Equipment Needs at Discharge (PT) walker, rolling   Risk & Benefits of therapy have been explained evaluation/treatment results reviewed;care plan/treatment goals reviewed;risks/benefits reviewed;current/potential barriers reviewed;participants voiced agreement with care plan;participants included;patient   Clinical Impression Comments somewhat impulsive   PT Total Evaluation Time   PT Eval, Low Complexity Minutes (27728) 8   Plan of Care Review   Plan of Care Reviewed With patient   Physical Therapy Goals   PT Frequency 4x/week   PT Predicted Duration/Target Date for Goal Attainment 10/13/23   PT Goals Gait;Bed Mobility;Transfers   PT: Bed Mobility Independent;Supine to/from sit;Within precautions   PT: Transfers Modified independent;Sit to/from stand;Bed to/from chair;Assistive device   PT: Gait Modified independent;Rolling walker;Greater than 200 feet   Interventions   Interventions Quick Adds Therapeutic Activity;Gait Training   Therapeutic Activity   Therapeutic Activities: dynamic activities to improve functional performance Minutes (34277) 8   Treatment Detail/Skilled Intervention PT: pt supine in bed, agreeable to therapy. Educated on post op precautions iwth log roll and penguine walk. Motivated to get up and walk down to SO room. Pt requires vc for long roll, does not follow. After returned to room from obtaining walker, pt with legs spread open  inspecting her dressing. Educated part of her restrictions are to avoid to ABD. Repeated cues for log  roll, attempted to lowe HOB, but pt impulsivey rises without, increased ABD, does not follow. Impulsive to rise before alker present but was steady on feet. After gait training, returned to room and instructed through log roll to return supine, but pt again impulsvely throws herself back onto bed. Firm education on risks of trowing stitch and not adhering to log roll with bed mobility. Pt states she always does this at baseline too. Will continue to address this, but unlikely pt to change.   Gait Training   Gait Training Minutes (87439) 9   Treatment Detail/Skilled Intervention PT: pt ambualtes x350' with FWW SBA > supervision. Needs initialy vc for penguine walk and reducing UE reliance, but as pain improves with amb smoother gait pattern notedd. Educated pt on importance of regular hallway ambulation, but still wanting Ax1 for safety at this time   PT Discharge Planning   PT Plan bed mobility, gait   PT Discharge Recommendation (DC Rec) home with assist   PT Rationale for DC Rec pt overall mobilizing well, up SBA/supervision, anticipate good ability to return home   PT Brief overview of current status SBA with FWW   Total Session Time   Timed Code Treatment Minutes 17   Total Session Time (sum of timed and untimed services) 25

## 2023-10-10 NOTE — PLAN OF CARE
Aox4. CMS intact. Pt endorsed pain at incision site at 2/10, managed with scheduled tylenol. At 0800 this AM, writer saw pt standing at edge of doorway holding all the monitors and the dowling. Pt had not called any staff and was waiting at the door. Pt was very concerned about SO and wanted to know when would be a good time to walk down the webster and visit said SO. RN educated pt that it is appropriate to call staff in order to be disconnected from monitors and that monitors should stay on the wall/fixtures. Pt stated 'Ok.'    VSS. On RA. Dowling draining well, UTV incision, drsg CDI. L PIV SL. Takes pills whole.   Pt denied n/v, ordered food independently and was eating well this shift.   Care plan updated.

## 2023-10-10 NOTE — PROGRESS NOTES
"Urology  Progress Note    ANA WILKINSONS, on 2LPM O2  Pain controlled  Tolerating regular diet, no n/v  Not ambulated yet    Exam  BP 98/66   Pulse 67   Temp 98.1  F (36.7  C) (Oral)   Resp 12   Ht 1.676 m (5' 5.98\")   Wt 70.8 kg (156 lb 1.4 oz)   SpO2 98%   BMI 25.21 kg/m    No acute distress  Unlabored breathing  Abdomen soft, nontender, nondistended. Incisions c/d/I, bolster in place, mildly saturated.   Horton with clear yellow urine in tubing    UOP 1150/800    Labs  Recent Labs   Lab Test 09/12/23  1500 01/31/23  1137 10/17/22  1256 05/12/22  1511   WBC 8.0 12.2* 7.5  --    HGB 14.3 13.3 12.8 12.3   CR 0.75 0.83  --  0.71      AM labs pending    Assessment/Plan  48 year old y/o adult POD#1 s/p minimal depth vaginoplasty for gender dysphoria.     Neuro: Sched tylenol, oxy/Dilaudid for pain control  CV: HDS  Pulm: incentive spirometry while awake  FEN/GI: regular diet, bowel regimen   Endo: ADELE  : Bolster and Horton catheter in place, sched Detrol  Heme/ID: periop abx - will discuss with staff. AM labs pending  Activity: up ad bhargavi, PT consulted   PPx: SCDs  Dispo: reveal on POD3    Seen and examined. Will discuss with Dr. Lucero.    Jane Booker MD, PGY-3  Urology Resident     Contacting the Urology Team     Please use the following job codes to reach the Urology Team. Note that you must use an in house phone and that job codes cannot receive text pages.   On weekdays, dial [phone] (or star-star-star [phone] on the new [employer] telephones) then [phone] to reach the Adult Urology resident or PA on call  On weekdays, dial [phone] (or star-star-star [phone] on the new [employer] telephones) then [phone] to reach the Pediatric Urology resident  On weeknights and weekends, dial [phone] (or star-star-star [phone] on the new [employer] telephones) then [phone] to reach the Urology resident on call (for both Adult and Pediatrics)        "

## 2023-10-10 NOTE — PHARMACY-ADMISSION MEDICATION HISTORY
Pharmacist Admission Medication History    Admission medication history is complete. The information provided in this note is only as accurate as the sources available at the time of the update.    Information Source(s): Patient and CareEverywhere/SureScripts via phone    Pertinent Information: None    Changes made to PTA medication list:  Added: None  Deleted: None  Changed: None    Medication Affordability:  Not including over the counter (OTC) medications, was there a time in the past 3 months when you did not take your medications as prescribed because of cost?: No       Allergies reviewed with patient and updates made in EHR: yes    Medication History Completed By: Gay Velez [facility] 10/10/2023 2:31 PM    PTA Med List   Medication Sig Note Last Dose    acetaminophen (TYLENOL) 325 MG tablet Take 325-650 mg by mouth every 6 hours as needed for mild pain  Past Month    albuterol (PROAIR HFA/PROVENTIL HFA/VENTOLIN HFA) 108 (90 Base) MCG/ACT inhaler Inhale 2 puffs into the lungs every 4 hours as needed for shortness of breath or wheezing Use with spacer 10/4/2023: Bring DOS 10/9/2023 at 0400    estradiol (ESTRACE) 2 MG tablet TAKE FOUR TABLETS (8 MG) BY MOUTH DAILY 10/4/2023: Holding for surgery Past Week    ibuprofen (ADVIL/MOTRIN) 200 MG tablet Take 200 mg by mouth every 4 hours as needed for pain  10/8/2023 at 2030    mometasone-formoterol (DULERA) 100-5 MCG/ACT inhaler Inhale 2 puffs into the lungs 2 times daily  10/9/2023 at 0400    nicotine (NICORETTE) 4 MG lozenge Place 1 lozenge (4 mg) inside cheek every hour as needed for smoking cessation Max 5 lozenge/6 hours and 20 lozenges/24 hours 10/4/2023: Hold AM DOS Past Week    QUEtiapine (SEROQUEL) 25 MG tablet Take 1 tablet (25 mg) by mouth 2 times daily  Past Month    tiotropium (SPIRIVA HANDIHALER) 18 MCG inhaled capsule INHALE THE CONTENTS OF 1 CAPSULE(18 MCG) INTO THE LUNGS DAILY  10/9/2023 at 0400

## 2023-10-11 ENCOUNTER — APPOINTMENT (OUTPATIENT)
Dept: PHYSICAL THERAPY | Facility: CLINIC | Age: 48
End: 2023-10-11
Attending: UROLOGY
Payer: COMMERCIAL

## 2023-10-11 VITALS
HEIGHT: 66 IN | WEIGHT: 156.09 LBS | DIASTOLIC BLOOD PRESSURE: 68 MMHG | TEMPERATURE: 98.7 F | RESPIRATION RATE: 15 BRPM | SYSTOLIC BLOOD PRESSURE: 96 MMHG | BODY MASS INDEX: 25.09 KG/M2 | OXYGEN SATURATION: 97 % | HEART RATE: 64 BPM

## 2023-10-11 LAB
ANION GAP SERPL CALCULATED.3IONS-SCNC: 8 MMOL/L (ref 7–15)
BASO+EOS+MONOS # BLD AUTO: ABNORMAL 10*3/UL
BASO+EOS+MONOS NFR BLD AUTO: ABNORMAL %
BASOPHILS # BLD AUTO: 0.1 10E3/UL (ref 0–0.2)
BASOPHILS NFR BLD AUTO: 1 %
BUN SERPL-MCNC: 14.4 MG/DL (ref 6–20)
CALCIUM SERPL-MCNC: 8.6 MG/DL (ref 8.6–10)
CHLORIDE SERPL-SCNC: 103 MMOL/L (ref 98–107)
CREAT SERPL-MCNC: 0.73 MG/DL (ref 0.51–1.17)
DEPRECATED HCO3 PLAS-SCNC: 26 MMOL/L (ref 22–29)
EGFRCR SERPLBLD CKD-EPI 2021: >90 ML/MIN/1.73M2
EOSINOPHIL # BLD AUTO: 0.2 10E3/UL (ref 0–0.7)
EOSINOPHIL NFR BLD AUTO: 2 %
ERYTHROCYTE [DISTWIDTH] IN BLOOD BY AUTOMATED COUNT: 13.2 % (ref 10–15)
GLUCOSE BLDC GLUCOMTR-MCNC: 105 MG/DL (ref 70–99)
GLUCOSE SERPL-MCNC: 111 MG/DL (ref 70–99)
HCT VFR BLD AUTO: 32 % (ref 35–53)
HGB BLD-MCNC: 10.4 G/DL (ref 11.7–17.7)
IMM GRANULOCYTES # BLD: 0 10E3/UL
IMM GRANULOCYTES NFR BLD: 0 %
LYMPHOCYTES # BLD AUTO: 1.9 10E3/UL (ref 0.8–5.3)
LYMPHOCYTES NFR BLD AUTO: 18 %
MCH RBC QN AUTO: 31.9 PG (ref 26.5–33)
MCHC RBC AUTO-ENTMCNC: 32.5 G/DL (ref 31.5–36.5)
MCV RBC AUTO: 98 FL (ref 78–100)
MONOCYTES # BLD AUTO: 0.9 10E3/UL (ref 0–1.3)
MONOCYTES NFR BLD AUTO: 9 %
NEUTROPHILS # BLD AUTO: 7.3 10E3/UL (ref 1.6–8.3)
NEUTROPHILS NFR BLD AUTO: 70 %
NRBC # BLD AUTO: 0 10E3/UL
NRBC BLD AUTO-RTO: 0 /100
PATH REPORT.COMMENTS IMP SPEC: NORMAL
PATH REPORT.COMMENTS IMP SPEC: NORMAL
PATH REPORT.FINAL DX SPEC: NORMAL
PATH REPORT.GROSS SPEC: NORMAL
PATH REPORT.MICROSCOPIC SPEC OTHER STN: NORMAL
PATH REPORT.MICROSCOPIC SPEC OTHER STN: NORMAL
PATH REPORT.RELEVANT HX SPEC: NORMAL
PHOTO IMAGE: NORMAL
PLATELET # BLD AUTO: 168 10E3/UL (ref 150–450)
POTASSIUM SERPL-SCNC: 4.1 MMOL/L (ref 3.4–5.3)
RBC # BLD AUTO: 3.26 10E6/UL (ref 3.8–5.9)
SODIUM SERPL-SCNC: 137 MMOL/L (ref 135–145)
WBC # BLD AUTO: 10.3 10E3/UL (ref 4–11)

## 2023-10-11 PROCEDURE — 97116 GAIT TRAINING THERAPY: CPT | Mod: GP

## 2023-10-11 PROCEDURE — 84132 ASSAY OF SERUM POTASSIUM: CPT

## 2023-10-11 PROCEDURE — 250N000013 HC RX MED GY IP 250 OP 250 PS 637

## 2023-10-11 PROCEDURE — 82374 ASSAY BLOOD CARBON DIOXIDE: CPT

## 2023-10-11 PROCEDURE — 36415 COLL VENOUS BLD VENIPUNCTURE: CPT

## 2023-10-11 PROCEDURE — 85025 COMPLETE CBC W/AUTO DIFF WBC: CPT

## 2023-10-11 RX ORDER — OXYCODONE HYDROCHLORIDE 5 MG/1
5 TABLET ORAL EVERY 6 HOURS PRN
Qty: 15 TABLET | Refills: 0 | Status: SHIPPED | OUTPATIENT
Start: 2023-10-11 | End: 2024-02-27

## 2023-10-11 RX ORDER — POLYETHYLENE GLYCOL 3350 17 G/17G
17 POWDER, FOR SOLUTION ORAL DAILY
Qty: 7 PACKET | Refills: 0 | Status: SHIPPED | OUTPATIENT
Start: 2023-10-11 | End: 2023-10-18

## 2023-10-11 RX ADMIN — ACETAMINOPHEN 975 MG: 325 TABLET, FILM COATED ORAL at 17:06

## 2023-10-11 RX ADMIN — QUETIAPINE FUMARATE 25 MG: 25 TABLET ORAL at 07:53

## 2023-10-11 RX ADMIN — OXYCODONE HYDROCHLORIDE 10 MG: 5 TABLET ORAL at 10:46

## 2023-10-11 RX ADMIN — POLYETHYLENE GLYCOL 3350 17 G: 17 POWDER, FOR SOLUTION ORAL at 07:53

## 2023-10-11 RX ADMIN — HYDROXYZINE HYDROCHLORIDE 25 MG: 25 TABLET, FILM COATED ORAL at 10:46

## 2023-10-11 RX ADMIN — ACETAMINOPHEN 975 MG: 325 TABLET, FILM COATED ORAL at 07:53

## 2023-10-11 RX ADMIN — SENNOSIDES AND DOCUSATE SODIUM 1 TABLET: 50; 8.6 TABLET ORAL at 07:53

## 2023-10-11 RX ADMIN — FLUTICASONE FUROATE AND VILANTEROL TRIFENATATE 1 PUFF: 100; 25 POWDER RESPIRATORY (INHALATION) at 07:55

## 2023-10-11 RX ADMIN — UMECLIDINIUM 1 PUFF: 62.5 AEROSOL, POWDER ORAL at 07:56

## 2023-10-11 ASSESSMENT — ACTIVITIES OF DAILY LIVING (ADL)
ADLS_ACUITY_SCORE: 24
ADLS_ACUITY_SCORE: 22

## 2023-10-11 NOTE — DISCHARGE SUMMARY
"[facility]   Urology Discharge Summary    Date of Admission: 10/9/2023  Date of Discharge: 10/11/2023    Admission Diagnosis:  Gender dysphoria [F64.9]  Post-operative state [Z98.890]    Discharge Diagnosis:  1. Same as above    Consultations:  PHYSICAL THERAPY ADULT IP CONSULT     Procedures:  Procedure(s):  Minimal Depth Vaginoplasty    Brief HPI:  Madi Padilla is a 48 year old year old adult with gender dysphoria. After discussion of the risks, benefits, and alternatives, they underwent the aforementioned procedure.     Hospital Course:  The patient tolerated the procedure well without complications and was transferred to the floor post-operatively.The patient's diet was advanced as appropriate bowel function returned. She had her big reveal on POD2 and tolerated well. Pain was controlled with oral pain medication and the patient was able to ambulate and  void without difficulty. The patient received appropriate education post operatively. On 10/11/2023 the patient was discharged to home.     Discharge Physical Exam:  BP 96/68 (BP Location: Left arm)   Pulse 64   Temp 98.7  F (37.1  C) (Oral)   Resp 15   Ht 1.676 m (5' 5.98\")   Wt 70.8 kg (156 lb 1.4 oz)   SpO2 97%   BMI 25.21 kg/m      See progress note from same day for DC exam    Meds:       Review of your medicines        START taking        Dose / Directions   polyethylene glycol 17 g packet  Commonly known as: MIRALAX  Used for: Gender dysphoria in adolescent and adult      Dose: 17 g  Take 17 g by mouth daily for 7 days  Quantity: 7 packet  Refills: 0            CONTINUE these medicines which have NOT CHANGED        Dose / Directions   acetaminophen 325 MG tablet  Commonly known as: TYLENOL      Dose: 325-650 mg  Take 325-650 mg by mouth every 6 hours as needed for mild pain  Refills: 0     albuterol 108 (90 Base) MCG/ACT inhaler  Commonly known as: PROAIR HFA/PROVENTIL HFA/VENTOLIN HFA  Used for: Chronic " bronchitis, unspecified chronic bronchitis type (H)      Dose: 2 puff  Inhale 2 puffs into the lungs every 4 hours as needed for shortness of breath or wheezing Use with spacer  Quantity: 36 g  Refills: 1     Dulera 100-5 MCG/ACT inhaler  Used for: Mild persistent asthma without complication  Generic drug: mometasone-formoterol      Dose: 2 puff  Inhale 2 puffs into the lungs 2 times daily  Quantity: 39 g  Refills: 3     estradiol 2 MG tablet  Commonly known as: ESTRACE  Used for: Gender dysphoria in adolescent and adult      TAKE FOUR TABLETS (8 MG) BY MOUTH DAILY  Quantity: 120 tablet  Refills: 2     ibuprofen 200 MG tablet  Commonly known as: ADVIL/MOTRIN      Dose: 200 mg  Take 200 mg by mouth every 4 hours as needed for pain  Refills: 0     nicotine 4 MG lozenge  Commonly known as: NICORETTE  Used for: Tobacco abuse counseling      Dose: 4 mg  Place 1 lozenge (4 mg) inside cheek every hour as needed for smoking cessation Max 5 lozenge/6 hours and 20 lozenges/24 hours  Quantity: 600 lozenge  Refills: 1     QUEtiapine 25 MG tablet  Commonly known as: SEROquel  Used for: Bipolar 2 disorder (H)      Dose: 25 mg  Take 1 tablet (25 mg) by mouth 2 times daily  Quantity: 180 tablet  Refills: 1     spacer/aero-hold chamber mask Jess  Used for: Mild persistent asthma without complication      Dose: 1 each  1 each every 4 hours as needed (use of albuterol)  Quantity: 1 each  Refills: 0     tiotropium 18 MCG inhaled capsule  Commonly known as: Spiriva HandiHaler  Used for: Mild persistent asthma without complication      INHALE THE CONTENTS OF 1 CAPSULE(18 MCG) INTO THE LUNGS DAILY  Quantity: 90 capsule  Refills: 3               Where to get your medicines        These medications were sent to [facility] [place], [place] - [address]  [address] [address]      Phone: [phone]     polyethylene glycol 17 g packet         Additional instructions:  After Care       Future  "Labs/Procedures    Activity     Comments:    Your activity upon discharge: activity as tolerated and see your discharge instruction    Diet     Comments:    Follow this diet upon discharge: Orders Placed This Encounter      Advance Diet as Tolerated: Regular Diet Adult            Follow Up:  - Follow-up with your urologist as scheduled   - Call or return sooner than your regularly scheduled visit if you develop any of the following: fever (greater than 101.5), uncontrolled pain, uncontrolled nausea or vomiting, as well as increased redness, swelling, or drainage from your wound.     Phone numbers:   - Monday through Friday 8am to 4:30pm: Call [phone] with questions or to schedule or confirm appointment.    - Nights or weekends: call the after hours emergency pager - [phone] and tell the  \"I would like to page the Urology Resident on call.\"  - For emergencies, call [phone]    The patient was discussed with the chief resident and staff on the day of discharge.     Ryan Granado MD  Urology Resident               "

## 2023-10-11 NOTE — PROGRESS NOTES
DISCHARGE    D: Patient discharged to home at 1800 . Patient accompanied by family friend    I: Discharge prescriptions given to patient. All discharge medications and instructions reviewed with ptt. Patient instructed to seek care if experiencing worsening symptoms, such as pain, bleeding. Other phone numbers to call with questions or concerns after discharge reviewed. PIV removed. Education completed.    A: Pt verbalized understanding of discharge medications and instructions. Prescribed home medications given to patient.  Belongings returned to patient.    P: Patient to follow-up  with primary.

## 2023-10-11 NOTE — PROGRESS NOTES
"SPIRITUAL HEALTH SERVICES Progress Note  [facility] ([facility]) 5BW    Made post surgery visit with pt. Pt reports \"feeling well except they need to take this junk out\" referring to stiches at the operation site. Pt shared they had been \"waiting 10 years\" for their surgery and that it is a \"relief to have it done.\" Pt also shared their partner had the same surgery the same day.  When asked about spiritual care needs, pt stated \"I am all good on that front.\"    cas webster  Chaplain Resident  Pager [phone]    * [facility] remains available 24/7 for emergent requests/referrals, either by having the switchboard page the on-call  or by entering an ASAP/STAT consult in Epic (this will also page the on-call ). Routine Epic consults receive an initial response within 24 hours.*   "

## 2023-10-11 NOTE — PLAN OF CARE
Physical Therapy Discharge Summary    Reason for therapy discharge:    All goals and outcomes met, no further needs identified.    Progress towards therapy goal(s). See goals on Care Plan in [facility] electronic health record for goal details.  Goals met    Therapy recommendation(s):    No further therapy is recommended.

## 2023-10-11 NOTE — PLAN OF CARE
"BP 96/68 (BP Location: Left arm)   Pulse 64   Temp 98.7  F (37.1  C) (Oral)   Resp 15   Ht 1.676 m (5' 5.98\")   Wt 70.8 kg (156 lb 1.4 oz)   SpO2 97%   BMI 25.21 kg/m     No acute changes this shift   Pt slept throughout. Minimal pain, rating 4/10. VSS. A.O x 4.On RAKiersten Horton in -to be removed today am . L..PIV saline locked. Independent in room .   "

## 2023-10-11 NOTE — PROGRESS NOTES
"Urology  Progress Note    NAEO, AFVSS   Pain controlled  Tolerating regular diet, no n/v      Exam  BP 98/59 (BP Location: Left arm)   Pulse 53   Temp 98.6  F (37  C) (Oral)   Resp 16   Ht 1.676 m (5' 5.98\")   Wt 70.8 kg (156 lb 1.4 oz)   SpO2 96%   BMI 25.21 kg/m    No acute distress  Unlabored breathing  Abdomen soft, nontender, nondistended. Incisions c/d/I, bolster in place, old dry blood.   Horton with clear yellow urine in tubing    /--    Labs  Recent Labs   Lab Test 10/10/23  0945 09/12/23  1500 01/31/23  1137   WBC 8.7 8.0 12.2*   HGB 10.8* 14.3 13.3   CR 0.73 0.75 0.83      AM labs pending    Assessment/Plan  48 year old y/o adult 2 Days Post-Op  s/p minimal depth vaginoplasty for gender dysphoria.     Neuro: Sched tylenol, oxy/Dilaudid for pain control  CV: HDS  Pulm: incentive spirometry while awake  FEN/GI: regular diet, bowel regimen   Endo: ADELE  : Bolster and Horton catheter in place  Heme/ID: monitor labs  Activity: up ad bhargavi, PT consulted   PPx: SCDs  Dispo: reveal today    Seen and examined. Will discuss with Dr. Lucero.    Jane Booker MD, PGY-3  Urology Resident     Contacting the Urology Team     Please use the following job codes to reach the Urology Team. Note that you must use an in house phone and that job codes cannot receive text pages.   On weekdays, dial [phone] (or star-star-star 777 on the new [employer] telephones) then [phone] to reach the Adult Urology resident or PA on call  On weekdays, dial [phone] (or star-star-star 777 on the new [employer] telephones) then [phone] to reach the Pediatric Urology resident  On weeknights and weekends, dial [phone] (or star-star-star 777 on the new [employer] telephones) then [phone] to reach the Urology resident on call (for both Adult and Pediatrics)        "

## 2023-10-11 NOTE — PLAN OF CARE
No acute changes. Patient has met discharge goals. Dowling removed by urology team this AM. Voiding without difficulty, PVRs wnl, and urology paged per team's request when PVRs done. UOP eugene, clear. Pt up walking as instructed with SO in hallways. Scant drainage noted on L side of incision after dowling removal but did not persist. LBM prior to surery. Pain minimal, denied intervention. PIV L hand SL. Good PO intake, no n/v. Tylenol given as scheduled. Pt can discharge once meds are delivered from pharmacy and education is given. Discussed with patient and pt's SO's and RN that it may be beneficial to perform the education together if the patients would like. Pt cleared for discharge and care plan updated.     Discharge paperwork and education printed and in physical chart.   Report given to CT Liu.

## 2023-10-12 ENCOUNTER — TELEPHONE (OUTPATIENT)
Dept: PLASTIC SURGERY | Facility: CLINIC | Age: 48
End: 2023-10-12
Payer: COMMERCIAL

## 2023-10-12 NOTE — TELEPHONE ENCOUNTER
"Called Kim to check in after discharging from the hospital yesterday after minimal depth vaginoplasty with Dr Lucero on 10/9/23. Pt says she is \"doing well.\" Pain is well-managed, alternating Tylenol and ibuprofen. She has taken a dose or two of Oxycodone, which she said helped the most at nighttime. She has no questions or concerns at this time and knows to reach out to us before her postop appointment with Dr Lucero on 10/24/23.  "

## 2023-10-20 ENCOUNTER — TELEPHONE (OUTPATIENT)
Dept: PLASTIC SURGERY | Facility: CLINIC | Age: 48
End: 2023-10-20
Payer: COMMERCIAL

## 2023-10-20 NOTE — TELEPHONE ENCOUNTER
"Pt cancelled her 2 week postop appointment with Dr Lucero on 10/24 after minimal depth vaginoplasty on 10/9. Called pt to discuss. Pt shared with me that both her and partner Sonya are on speaker (partner had same surgery on 10/9 as well).     Pt said that they cancelled their appointment because they \"missed it.\" Explained that their appointments are next Tuesday on 10/24. Pt thought today was 10/24. Pt acknowledged this and said they would try to make it to this appointment. Explained the importance of this appointment, to which the pt verbalized an understanding. Will schedule 10/24 appointments.     Pt is doing well, has minimal pain and has no questions or concerns at this time.      "

## 2023-10-21 ENCOUNTER — MYC REFILL (OUTPATIENT)
Dept: FAMILY MEDICINE | Facility: CLINIC | Age: 48
End: 2023-10-21
Payer: COMMERCIAL

## 2023-10-21 DIAGNOSIS — R52 PAIN: Primary | ICD-10-CM

## 2023-10-21 DIAGNOSIS — F64.0 TRANSSEXUALISM: ICD-10-CM

## 2023-10-21 DIAGNOSIS — J45.30 MILD PERSISTENT ASTHMA WITHOUT COMPLICATION: ICD-10-CM

## 2023-10-23 RX ORDER — ACETAMINOPHEN 325 MG/1
325-650 TABLET ORAL EVERY 6 HOURS PRN
Qty: 60 TABLET | Refills: 1 | Status: SHIPPED | OUTPATIENT
Start: 2023-10-23 | End: 2024-05-15

## 2023-10-23 RX ORDER — OXYCODONE HYDROCHLORIDE 5 MG/1
5 TABLET ORAL EVERY 6 HOURS PRN
Qty: 15 TABLET | Refills: 0 | OUTPATIENT
Start: 2023-10-23

## 2023-10-23 RX ORDER — IBUPROFEN 200 MG
400 TABLET ORAL EVERY 6 HOURS PRN
Qty: 60 TABLET | Refills: 1 | Status: SHIPPED | OUTPATIENT
Start: 2023-10-23 | End: 2024-07-03

## 2023-10-24 ENCOUNTER — OFFICE VISIT (OUTPATIENT)
Dept: PLASTIC SURGERY | Facility: CLINIC | Age: 48
End: 2023-10-24
Payer: COMMERCIAL

## 2023-10-24 VITALS
OXYGEN SATURATION: 98 % | WEIGHT: 153.6 LBS | TEMPERATURE: 99.5 F | BODY MASS INDEX: 24.68 KG/M2 | SYSTOLIC BLOOD PRESSURE: 110 MMHG | HEART RATE: 99 BPM | HEIGHT: 66 IN | DIASTOLIC BLOOD PRESSURE: 69 MMHG

## 2023-10-24 DIAGNOSIS — Z87.890 STATUS POST GENDER AFFIRMATION SURGERY: Primary | ICD-10-CM

## 2023-10-24 PROCEDURE — 99024 POSTOP FOLLOW-UP VISIT: CPT | Performed by: UROLOGY

## 2023-10-24 ASSESSMENT — PAIN SCALES - GENERAL: PAINLEVEL: SEVERE PAIN (7)

## 2023-10-24 NOTE — LETTER
"10/24/2023       RE: Madi Padilla  [address]  [facility] [address]       Dear Colleague,    Thank you for referring your patient, Madi Padilla, to the [facility] [place] at [facility]. Please see a copy of my visit note below.    SUBJECTIVE:  Kim is a 48 year old here for her initial post-operative visit after undergoing minimal depth vaginoplasty on 10/09/23.   She reports feeling sore but pain medication is controlling things overall. No problems urinating. Feels like incisions are healing okay.  She is eager to have the pain gone and things be fully healed. Otherwise no concerns.    OBJECTIVE:  /69 (BP Location: Left arm, Patient Position: Sitting, Cuff Size: Adult Regular)   Pulse 99   Temp 99.5  F (37.5  C) (Oral)   Ht 1.676 m (5' 6\")   Wt 69.7 kg (153 lb 9.6 oz)   SpO2 98%   BMI 24.79 kg/m     General: NAD  Excellent healing vaginoplasty  Symmetric labia minora   Healthy structures with no wounds    ASSESSMENT/PLAN:  S/P minimal depth vaginoplasty  Doing great  RTC as scheduled to see Brittani Jaeger NP    Physician Attestation  I, Golden Lucero MD, saw this patient and agree with the findings and plan of care as documented in the note.            Again, thank you for allowing me to participate in the care of your patient.      Sincerely,    Golden Lucero MD    "

## 2023-10-24 NOTE — NURSING NOTE
"Chief Complaint   Patient presents with    Surgical Followup     Kim is being seen today for a 2 week post-op, DOS 10/9/23.       Vitals:    10/24/23 1206   BP: 110/69   BP Location: Left arm   Patient Position: Sitting   Cuff Size: Adult Regular   Pulse: 99   Temp: 99.5  F (37.5  C)   TempSrc: Oral   SpO2: 98%   Weight: 69.7 kg (153 lb 9.6 oz)   Height: 1.676 m (5' 6\")       Body mass index is 24.79 kg/m .    Hayden Joe, EMT    "

## 2023-10-24 NOTE — PROGRESS NOTES
"SUBJECTIVE:  Kim is a 48 year old here for her initial post-operative visit after undergoing minimal depth vaginoplasty on 10/09/23.   She reports feeling sore but pain medication is controlling things overall. No problems urinating. Feels like incisions are healing okay.  She is eager to have the pain gone and things be fully healed. Otherwise no concerns.    OBJECTIVE:  /69 (BP Location: Left arm, Patient Position: Sitting, Cuff Size: Adult Regular)   Pulse 99   Temp 99.5  F (37.5  C) (Oral)   Ht 1.676 m (5' 6\")   Wt 69.7 kg (153 lb 9.6 oz)   SpO2 98%   BMI 24.79 kg/m     General: NAD  Excellent healing vaginoplasty  Symmetric labia minora   Healthy structures with no wounds    ASSESSMENT/PLAN:  S/P minimal depth vaginoplasty  Doing great  RTC as scheduled to see Brittani Jaeger NP    Physician Attestation   I, Golden Lucero MD, saw this patient and agree with the findings and plan of care as documented in the note.      Golden Lucero MD  Reconstructive Urology        "

## 2023-10-27 ENCOUNTER — TELEPHONE (OUTPATIENT)
Dept: PLASTIC SURGERY | Facility: CLINIC | Age: 48
End: 2023-10-27
Payer: COMMERCIAL

## 2023-10-27 DIAGNOSIS — Z87.890 STATUS POST GENDER AFFIRMATION SURGERY: Primary | ICD-10-CM

## 2023-10-27 RX ORDER — SULFAMETHOXAZOLE/TRIMETHOPRIM 800-160 MG
1 TABLET ORAL 2 TIMES DAILY
Qty: 14 TABLET | Refills: 0 | Status: SHIPPED | OUTPATIENT
Start: 2023-10-27 | End: 2024-02-27

## 2023-10-27 NOTE — PROGRESS NOTES
Kim reported a fever of 101-102 along with chills and body aches. She started to feel subjective fever a few days ago and finally found thermometer to take temp.   She has not noticed any increase in drainage, swelling, or pain.  No reported symptoms of congestion or other URI symptoms.   Will order antibiotics for possible infection

## 2023-10-27 NOTE — TELEPHONE ENCOUNTER
RN spoke to Kim in regards to her partner, Susans, medical needs. At this conversation, Kim said she was feeling weak and tired, having chills, a headache, and thought she was having a fever but had not taken her temperature. RN asked pt to take temperature at home. Kim denied increase in pain. She said onset of symptoms was Tuesday Oct 24th. She is taking Tylenol for symptom management. RN will discuss with provider.

## 2023-10-27 NOTE — TELEPHONE ENCOUNTER
Follow up with patient regarding concerning symptoms. Pt had found thermometer and had readings of 101 and 102 per pt. Still having chills, muscle aches, weakness. Pt denies increase in pain or drainage from vulva. RN advised pt that this could be an infection and RN will talk to provider about antibiotics.  Kathryn Goodman RN on 10/27/2023 at 2:07 PM

## 2023-10-27 NOTE — TELEPHONE ENCOUNTER
Kim called back to inform RN that she does have oozing from the vulva and describes it as cream colored and odorous. RN told Kim the prescription for Bactrim was sent to her pharmacy and that we would be following up with her after the weekend to see if her symptoms improved.  Kathryn Goodman RN on 10/27/2023 at 2:25 PM

## 2023-10-30 ENCOUNTER — TELEPHONE (OUTPATIENT)
Dept: PLASTIC SURGERY | Facility: CLINIC | Age: 48
End: 2023-10-30
Payer: COMMERCIAL

## 2023-10-30 NOTE — TELEPHONE ENCOUNTER
Called pt to follow up on possible infection. Pt is taking Bactrim. Denies fevers and chills. No change in pain (it had not gotten worse). Pt no longer taking oxycodone. Affirms there is still discharge that is light brown and tan and odiferous. This could be normal post-surgical discharge - it is unclear from patient's description. Pt advised to keep taking antibiotics as prescribed.  Kathryn Goodman RN on 10/30/2023 at 12:34 PM

## 2023-11-10 NOTE — TELEPHONE ENCOUNTER
Called pt to see if they are coming in for their appointment today. Left VM with call back message.

## 2023-11-16 ENCOUNTER — TELEPHONE (OUTPATIENT)
Dept: PLASTIC SURGERY | Facility: CLINIC | Age: 48
End: 2023-11-16
Payer: COMMERCIAL

## 2023-11-16 NOTE — TELEPHONE ENCOUNTER
Called pt to let her know that we will provide her a parking voucher when she comes to postop appointment with Brittani Jaeger NP next week. Unable to reach or leave voicemail.

## 2023-11-20 ENCOUNTER — OFFICE VISIT (OUTPATIENT)
Dept: PLASTIC SURGERY | Facility: CLINIC | Age: 48
End: 2023-11-20
Payer: COMMERCIAL

## 2023-11-20 ENCOUNTER — MYC MEDICAL ADVICE (OUTPATIENT)
Dept: CARDIOLOGY | Facility: CLINIC | Age: 48
End: 2023-11-20

## 2023-11-20 VITALS
OXYGEN SATURATION: 98 % | BODY MASS INDEX: 24.88 KG/M2 | HEIGHT: 66 IN | DIASTOLIC BLOOD PRESSURE: 77 MMHG | TEMPERATURE: 98.2 F | HEART RATE: 96 BPM | SYSTOLIC BLOOD PRESSURE: 111 MMHG | WEIGHT: 154.8 LBS

## 2023-11-20 DIAGNOSIS — Z87.890 STATUS POST GENDER AFFIRMATION SURGERY: Primary | ICD-10-CM

## 2023-11-20 PROCEDURE — 99024 POSTOP FOLLOW-UP VISIT: CPT | Performed by: NURSE PRACTITIONER

## 2023-11-20 ASSESSMENT — PAIN SCALES - GENERAL: PAINLEVEL: MODERATE PAIN (5)

## 2023-11-20 NOTE — LETTER
"11/20/2023       RE: Madi Padilla  [address]  [place] [facility] [address]     Dear Colleague,    Thank you for referring your patient, Madi Padilla, to the [facility]LASTIC [facility] RECON[facility] SURGERY CLINIC [place] at [facility]. Please see a copy of my visit note below.    SUBJECTIVE:  Kim is a 48 year old patient here for follow-up after undergoing minimal depth vaginoplasty with Dr Lucero on 10/09/23.  Kim reports feeling good and being happy with look of her new vagina.   Her pain is well managed with OTC pain medication. She does report some irritation from remaining suture knots at top of her labia minora and would like those trimmed today if possible.    OBJECTIVE:  /77 (BP Location: Left arm, Patient Position: Sitting, Cuff Size: Adult Regular)   Pulse 96   Temp 98.2  F (36.8  C) (Oral)   Ht 1.676 m (5' 6\")   Wt 70.2 kg (154 lb 12.8 oz)   SpO2 98%   BMI 24.99 kg/m     General: NAD  Vulva with well healed incisions  Symmetrical labia majora and good definition of labia minora. Left labia minora with more definition along entire length of labia than right side.  Clitoris healthy and well hooded  Small suture tail palpable at top of left labial crease - trimmed per patient request    ASSESSMENT/PLAN:  S/P minimal depth vaginoplasty with excellent aesthetic results  RTC for 4-6 month visit with Dr Lucero      A total of 20 minutes was spent today with patient, reviewing records, completing charting, and other tasks as detailed above.           Again, thank you for allowing me to participate in the care of your patient.      Sincerely,    NIKKI Callahan CNP    "

## 2023-11-20 NOTE — PROGRESS NOTES
"SUBJECTIVE:  Kim is a 48 year old patient here for follow-up after undergoing minimal depth vaginoplasty with Dr Lucero on 10/09/23.  Kim reports feeling good and being happy with look of her new vagina.   Her pain is well managed with OTC pain medication. She does report some irritation from remaining suture knots at top of her labia minora and would like those trimmed today if possible.    OBJECTIVE:  /77 (BP Location: Left arm, Patient Position: Sitting, Cuff Size: Adult Regular)   Pulse 96   Temp 98.2  F (36.8  C) (Oral)   Ht 1.676 m (5' 6\")   Wt 70.2 kg (154 lb 12.8 oz)   SpO2 98%   BMI 24.99 kg/m     General: NAD  Vulva with well healed incisions  Symmetrical labia majora and good definition of labia minora. Left labia minora with more definition along entire length of labia than right side.  Clitoris healthy and well hooded  Small suture tail palpable at top of left labial crease - trimmed per patient request    ASSESSMENT/PLAN:  S/P minimal depth vaginoplasty with excellent aesthetic results  RTC for 4-6 month visit with Dr Nic Jaeger, APRN, CNP    A total of 20 minutes was spent today with patient, reviewing records, completing charting, and other tasks as detailed above.   "

## 2023-11-20 NOTE — TELEPHONE ENCOUNTER
Sent pt [employer] message offering an appointment with Dr Davenport to discuss top surgery.  Gray DIAMOND RN

## 2023-11-20 NOTE — NURSING NOTE
"Chief Complaint   Patient presents with    Surgical Followup     Kim is being seen today for a 6 week post-op, DOS 10/09/23.       Vitals:    11/20/23 0954   BP: 111/77   BP Location: Left arm   Patient Position: Sitting   Cuff Size: Adult Regular   Pulse: 96   Temp: 98.2  F (36.8  C)   TempSrc: Oral   SpO2: 98%   Weight: 70.2 kg (154 lb 12.8 oz)   Height: 1.676 m (5' 6\")       Body mass index is 24.99 kg/m .    Patient reports moderate genital pain (5/10).    Hayden Joe, EMT    "

## 2023-11-21 ENCOUNTER — DOCUMENTATION ONLY (OUTPATIENT)
Dept: HOME HEALTH SERVICES | Facility: CLINIC | Age: 48
End: 2023-11-21
Payer: COMMERCIAL

## 2023-11-21 DIAGNOSIS — Z98.890 POST-OPERATIVE STATE: Primary | ICD-10-CM

## 2024-01-09 DIAGNOSIS — F64.0 TRANSSEXUALISM: ICD-10-CM

## 2024-01-09 RX ORDER — ESTRADIOL 2 MG/1
TABLET ORAL
Qty: 120 TABLET | Refills: 2 | Status: SHIPPED | OUTPATIENT
Start: 2024-01-09 | End: 2024-02-27

## 2024-02-06 DIAGNOSIS — Z87.891 PERSONAL HISTORY OF TOBACCO USE, PRESENTING HAZARDS TO HEALTH: Primary | ICD-10-CM

## 2024-02-06 DIAGNOSIS — Z71.6 ENCOUNTER FOR SMOKING CESSATION COUNSELING: Primary | ICD-10-CM

## 2024-02-06 NOTE — PROGRESS NOTES
Pt requested order for urine cotinine as she has been nicotine free for 6 weeks. She also requested order to be faxed to [facility]. This was done per request today.  Gray DIAMOND RN

## 2024-02-06 NOTE — PROGRESS NOTES
Pt requested order for urine cotinine as she has been nicotine free for 6 weeks. She also requested order to be faxed to [facility]. This was done per request today. Gray DIAMOND RN

## 2024-02-07 ENCOUNTER — DOCUMENTATION ONLY (OUTPATIENT)
Dept: PLASTIC SURGERY | Facility: CLINIC | Age: 49
End: 2024-02-07
Payer: COMMERCIAL

## 2024-02-07 NOTE — PROGRESS NOTES
Urine Cotinine lab order faxed to [facility] in [place], [place] per pt request. Gray DIAMOND RN

## 2024-02-20 SDOH — HEALTH STABILITY: PHYSICAL HEALTH: ON AVERAGE, HOW MANY MINUTES DO YOU ENGAGE IN EXERCISE AT THIS LEVEL?: 10 MIN

## 2024-02-20 SDOH — HEALTH STABILITY: PHYSICAL HEALTH: ON AVERAGE, HOW MANY DAYS PER WEEK DO YOU ENGAGE IN MODERATE TO STRENUOUS EXERCISE (LIKE A BRISK WALK)?: 1 DAY

## 2024-02-20 ASSESSMENT — SOCIAL DETERMINANTS OF HEALTH (SDOH): HOW OFTEN DO YOU GET TOGETHER WITH FRIENDS OR RELATIVES?: ONCE A WEEK

## 2024-02-20 NOTE — COMMUNITY RESOURCES LIST (ENGLISH)
02/20/2024   [facility]  N/A  For questions about this resource list or additional care needs, please contact your primary care clinic or care manager.  Phone: [phone]   Email: N/A   Address: [address] [place] [address]   Hours: N/A        Food and Nutrition       Food pantry  1  [facility] Food Pantry - Food Shelf Distance: 0.1 miles      Pickup   [address] [place] [address]  Language: English  Hours: Mon 4:00 PM - 6:00 PM , Tue 9:30 AM - 12:00 PM , Wed 4:00 PM - 6:00 PM , Thu 9:30 AM - 12:00 PM , Fri Appt. Only  Fees: Free   Phone: (908) 389-1897 Email: [email or web address]@[employer] Website: http://[employer]     2  [facility] Service Extension Unit - Hotline Distance: 0.39 miles      Phone/Virtual   [address] [place] [address]  Language: English  Hours: Mon - Sun Open 24 Hours  Fees: Free   Phone: (214) 345-9443 Website: [email or web address]-service-extension/     SNAP application assistance  3  Workforce Resource - [facility] [place] Distance: 11.12 miles      In-Person, Phone/Virtual   [address] [place] [address]  Language: English, Hmong, Finnish, Mohawk  Hours: Mon - Fri 8:00 AM - 4:30 PM  Fees: Free   Phone: (624) 137-9216 Website: https://www.workforceresource.org/     Soup kitchen or free meals  4  [employer] Distance: 10.58 miles      In-Person   [address] [place] [address] [place] [address]  Language: English  Hours: Sun 12:00 PM - 1:30 PM  Fees: Free   Phone: (109) 668-8801 Email: bedchurch@[employer] Website: https://www.[employer].com.com/Nadia/     5  [facility] - Breakfast on the Run Distance: 10.67 miles      Pickup   [address] [place] [address]  Language: English  Hours: Sat 9:00 AM - 10:00 AM  Fees: Free   Phone: (269) 295-1432 Email:  [email or web address] Website: [email or web address]          Important Numbers & Websites       Emergency Services   911  [facility] Services   311  Poison Control   (593) 349-4006  Suicide Prevention Lifeline   (855) 760-7966 (TALK)  Child Abuse Hotline   (827) 454-9488 (4-A-Child)  Sexual Assault Hotline   (332) 236-2497 (HOPE)  National Runaway Safeline   (763) 321-7784 (RUNAWAY)  All-Options Talkline   (278) 706-4660  Substance Abuse Referral   (129) 114-7139 (HELP)

## 2024-02-27 ENCOUNTER — OFFICE VISIT (OUTPATIENT)
Dept: FAMILY MEDICINE | Facility: CLINIC | Age: 49
End: 2024-02-27
Payer: COMMERCIAL

## 2024-02-27 VITALS
RESPIRATION RATE: 16 BRPM | HEART RATE: 76 BPM | OXYGEN SATURATION: 97 % | SYSTOLIC BLOOD PRESSURE: 115 MMHG | BODY MASS INDEX: 25.5 KG/M2 | WEIGHT: 158 LBS | DIASTOLIC BLOOD PRESSURE: 75 MMHG

## 2024-02-27 DIAGNOSIS — E78.5 HYPERLIPIDEMIA LDL GOAL <100: ICD-10-CM

## 2024-02-27 DIAGNOSIS — F31.81 BIPOLAR 2 DISORDER (H): ICD-10-CM

## 2024-02-27 DIAGNOSIS — Z23 NEED FOR VACCINATION: ICD-10-CM

## 2024-02-27 DIAGNOSIS — Z51.81 ENCOUNTER FOR THERAPEUTIC DRUG MONITORING: ICD-10-CM

## 2024-02-27 DIAGNOSIS — Z12.2 SCREENING FOR LUNG CANCER: ICD-10-CM

## 2024-02-27 DIAGNOSIS — F64.0 TRANSSEXUALISM: ICD-10-CM

## 2024-02-27 DIAGNOSIS — Z71.6 TOBACCO ABUSE COUNSELING: ICD-10-CM

## 2024-02-27 DIAGNOSIS — G89.29 CHRONIC BILATERAL LOW BACK PAIN WITHOUT SCIATICA: ICD-10-CM

## 2024-02-27 DIAGNOSIS — Z12.11 SCREEN FOR COLON CANCER: Primary | ICD-10-CM

## 2024-02-27 DIAGNOSIS — Z00.00 ROUTINE GENERAL MEDICAL EXAMINATION AT A HEALTH CARE FACILITY: ICD-10-CM

## 2024-02-27 DIAGNOSIS — M54.50 CHRONIC BILATERAL LOW BACK PAIN WITHOUT SCIATICA: ICD-10-CM

## 2024-02-27 DIAGNOSIS — R73.9 HYPERGLYCEMIA: ICD-10-CM

## 2024-02-27 DIAGNOSIS — Z87.891 PERSONAL HISTORY OF TOBACCO USE: ICD-10-CM

## 2024-02-27 DIAGNOSIS — J45.30 MILD PERSISTENT ASTHMA WITHOUT COMPLICATION: ICD-10-CM

## 2024-02-27 DIAGNOSIS — Z78.9 MALE-TO-FEMALE TRANSGENDER PERSON: ICD-10-CM

## 2024-02-27 DIAGNOSIS — J42 CHRONIC BRONCHITIS, UNSPECIFIED CHRONIC BRONCHITIS TYPE (H): ICD-10-CM

## 2024-02-27 LAB — HBA1C MFR BLD: 5.8 % (ref 0–5.6)

## 2024-02-27 PROCEDURE — 80061 LIPID PANEL: CPT | Performed by: FAMILY MEDICINE

## 2024-02-27 PROCEDURE — 99396 PREV VISIT EST AGE 40-64: CPT | Mod: 25 | Performed by: FAMILY MEDICINE

## 2024-02-27 PROCEDURE — 90750 HZV VACC RECOMBINANT IM: CPT | Performed by: FAMILY MEDICINE

## 2024-02-27 PROCEDURE — 82670 ASSAY OF TOTAL ESTRADIOL: CPT | Performed by: FAMILY MEDICINE

## 2024-02-27 PROCEDURE — 36415 COLL VENOUS BLD VENIPUNCTURE: CPT | Performed by: FAMILY MEDICINE

## 2024-02-27 PROCEDURE — 90471 IMMUNIZATION ADMIN: CPT | Mod: SL | Performed by: FAMILY MEDICINE

## 2024-02-27 PROCEDURE — 90746 HEPB VACCINE 3 DOSE ADULT IM: CPT | Performed by: FAMILY MEDICINE

## 2024-02-27 PROCEDURE — 90472 IMMUNIZATION ADMIN EACH ADD: CPT | Mod: SL | Performed by: FAMILY MEDICINE

## 2024-02-27 PROCEDURE — G0296 VISIT TO DETERM LDCT ELIG: HCPCS | Performed by: FAMILY MEDICINE

## 2024-02-27 PROCEDURE — 99214 OFFICE O/P EST MOD 30 MIN: CPT | Mod: 25 | Performed by: FAMILY MEDICINE

## 2024-02-27 PROCEDURE — 83036 HEMOGLOBIN GLYCOSYLATED A1C: CPT | Performed by: FAMILY MEDICINE

## 2024-02-27 RX ORDER — TIOTROPIUM BROMIDE 18 UG/1
CAPSULE ORAL; RESPIRATORY (INHALATION)
Qty: 90 CAPSULE | Refills: 3 | Status: SHIPPED | OUTPATIENT
Start: 2024-02-27 | End: 2024-07-03

## 2024-02-27 RX ORDER — ALBUTEROL SULFATE 90 UG/1
2 AEROSOL, METERED RESPIRATORY (INHALATION) EVERY 4 HOURS PRN
Qty: 36 G | Refills: 1 | Status: SHIPPED | OUTPATIENT
Start: 2024-02-27 | End: 2024-05-15

## 2024-02-27 RX ORDER — IBUPROFEN 800 MG/1
800 TABLET, FILM COATED ORAL EVERY 8 HOURS PRN
Qty: 30 TABLET | Refills: 5 | Status: SHIPPED | OUTPATIENT
Start: 2024-02-27 | End: 2024-05-15

## 2024-02-27 RX ORDER — ESTRADIOL 2 MG/1
TABLET ORAL
Qty: 120 TABLET | Refills: 2 | Status: SHIPPED | OUTPATIENT
Start: 2024-02-27 | End: 2024-05-15

## 2024-02-27 RX ORDER — QUETIAPINE FUMARATE 25 MG/1
25 TABLET, FILM COATED ORAL 2 TIMES DAILY
Qty: 180 TABLET | Refills: 1 | Status: SHIPPED | OUTPATIENT
Start: 2024-02-27 | End: 2024-05-15

## 2024-02-27 NOTE — PATIENT INSTRUCTIONS
Lung Cancer Screening   Frequently Asked Questions  If you are at high-risk for lung cancer, getting screened with low-dose computed tomography (LDCT) every year can help save your life. This handout offers answers to some of the most common questions about lung cancer screening. If you have other questions, please call [phone] ([phone]).     What is it?  Lung cancer screening uses special X-ray technology to create an image of your lung tissue. The exam is quick and easy and takes less than 10 seconds. We don t give you any medicine or use any needles. You can eat before and after the exam. You don t need to change your clothes as long as the clothing on your chest doesn t contain metal. But, you do need to be able to hold your breath for at least 6 seconds during the exam.    What is the goal of lung cancer screening?  The goal of lung cancer screening is to save lives. Many times, lung cancer is not found until a person starts having physical symptoms. Lung cancer screening can help detect lung cancer in the earliest stages when it may be easier to treat.    Who should be screened for lung cancer?  We suggest lung cancer screening for anyone who is at high-risk for lung cancer. You are in the high-risk group if you:      are between the ages of 55 and 79, and    have smoked at least 1 pack of cigarettes a day for 20 or more years, and    still smoke or have quit within the past 15 years.    However, if you have a new cough or shortness of breath, you should talk to your doctor before being screened.    Why does it matter if I have symptoms?  Certain symptoms can be a sign that you have a condition in your lungs that should be checked and treated by your doctor. These symptoms include fever, chest pain, a new or changing cough, shortness of breath that you have never felt before, coughing up blood or unexplained weight loss. Having any of these symptoms can greatly affect the results of lung  cancer screening.       Should all smokers get an LDCT lung cancer screening exam?  It depends. Lung cancer screening is for a very specific group of men and women who have a history of heavy smoking over a long period of time (see  Who should be screened for lung cancer  above).  I am in the high-risk group, but have been diagnosed with cancer in the past. Is LDCT lung cancer screening right for me?  In some cases, you should not have LDCT lung screening, such as when your doctor is already following your cancer with CT scan studies. Your doctor will help you decide if LDCT lung screening is right for you.  Do I need to have a screening exam every year?  Yes. If you are in the high-risk group described earlier, you should get an LDCT lung cancer screening exam every year until you are 79, or are no longer willing or able to undergo screening and possible procedures to diagnose and treat lung cancer.  How effective is LDCT at preventing death from lung cancer?  Studies have shown that LDCT lung cancer screening can lower the risk of death from lung cancer by 20 percent in people who are at high-risk.  What are the risks?  There are some risks and limitations of LDCT lung cancer screening. We want to make sure you understand the risks and benefits, so please let us know if you have any questions. Your doctor may want to talk with you more about these risks.    Radiation exposure: As with any exam that uses radiation, there is a very small increased risk of cancer. The amount of radiation in LDCT is small--about the same amount a person would get from a mammogram. Your doctor orders the exam when he or she feels the potential benefits outweigh the risks.    False negatives: No test is perfect, including LDCT. It is possible that you may have a medical condition, including lung cancer, that is not found during your exam. This is called a false negative result.    False positives and more testing: LDCT very often finds  something in the lung that could be cancer, but in fact is not. This is called a false positive result. False positive tests often cause anxiety. To make sure these findings are not cancer, you may need to have more tests. These tests will be done only if you give us permission. Sometimes patients need a treatment that can have side effects, such as a biopsy. For more information on false positives, see  What can I expect from the results?     Findings not related to lung cancer: Your LDCT exam also takes pictures of areas of your body next to your lungs. In a very small number of cases, the CT scan will show an abnormal finding in one of these areas, such as your kidneys, adrenal glands, liver or thyroid. This finding may not be serious, but you may need more tests. Your doctor can help you decide what other tests you may need, if any.  What can I expect from the results?  About 1 out of 4 LDCT exams will find something that may need more tests. Most of the time, these findings are lung nodules. Lung nodules are very small collections of tissue in the lung. These nodules are very common, and the vast majority--more than 97 percent--are not cancer (benign). Most are normal lymph nodes or small areas of scarring from past infections.  But, if a small lung nodule is found to be cancer, the cancer can be cured more than 90 percent of the time. To know if the nodule is cancer, we may need to get more images before your next yearly screening exam. If the nodule has suspicious features (for example, it is large, has an odd shape or grows over time), we will refer you to a specialist for further testing.  Will my doctor also get the results?  Yes. Your doctor will get a copy of your results.  Is it okay to keep smoking now that there s a cancer screening exam?  No. Tobacco is one of the strongest cancer-causing agents. It causes not only lung cancer, but other cancers and cardiovascular (heart) diseases as well. The damage  caused by smoking builds over time. This means that the longer you smoke, the higher your risk of disease. While it is never too late to quit, the sooner you quit, the better.  Where can I find help to quit smoking?  The best way to prevent lung cancer is to stop smoking. If you have already quit smoking, congratulations and keep it up! For help on quitting smoking, please call [employer] at 9-715-QUITNOW ([phone]) or the American [employer] Society at 1-227.407.2899 to find local resources near you.  One-on-one health coaching:  If you d prefer to work individually with a health care provider on tobacco cessation, we offer:      Medication Therapy Management:  Our specially trained pharmacists work closely with you and your doctor to help you quit smoking.  Call [phone] or [phone] (toll free).    Preventive Care Advice   This is general advice given by our system to help you stay healthy. However, your care team may have specific advice just for you. Please talk to your care team about your preventive care needs.  Nutrition  Eat 5 or more servings of fruits and vegetables each day.  Try wheat bread, brown rice and whole grain pasta (instead of white bread, rice, and pasta).  Get enough calcium and vitamin D. Check the label on foods and aim for 100% of the RDA (recommended daily allowance).  Lifestyle  Exercise at least 150 minutes each week   (30 minutes a day, 5 days a week).  Do muscle strengthening activities 2 days a week. These help control your weight and prevent disease.  No smoking.  Wear sunscreen to prevent skin cancer.  Have a dental exam and cleaning every 6 months.  Yearly exams  See your health care team every year to talk about:  Any changes in your health.  Any medicines your care team has prescribed.  Preventive care, family planning, and ways to prevent chronic diseases.  Shots (vaccines)   HPV shots (up to age 26), if you've never had them before.  Hepatitis B shots (up to age  59), if you've never had them before.  COVID-19 shot: Get this shot when it's due.  Flu shot: Get a flu shot every year.  Tetanus shot: Get a tetanus shot every 10 years.  Pneumococcal, hepatitis A, and RSV shots: Ask your care team if you need these based on your risk.  Shingles shot (for age 50 and up).  General health tests  Diabetes screening:  Starting at age 35, Get screened for diabetes at least every 3 years.  If you are younger than age 35, ask your care team if you should be screened for diabetes.  Cholesterol test: At age 39, start having a cholesterol test every 5 years, or more often if advised.  Bone density scan (DEXA): At age 50, ask your care team if you should have this scan for osteoporosis (brittle bones).  Hepatitis C: Get tested at least once in your life.  STIs (sexually transmitted infections)  Before age 24: Ask your care team if you should be screened for STIs.  After age 24: Get screened for STIs if you're at risk. You are at risk for STIs (including HIV) if:  You are sexually active with more than one person.  You don't use condoms every time.  You or a partner was diagnosed with a sexually transmitted infection.  If you are at risk for HIV, ask about PrEP medicine to prevent HIV.  Get tested for HIV at least once in your life, whether you are at risk for HIV or not.  Cancer screening tests  Cervical cancer screening: If you have a cervix, begin getting regular cervical cancer screening tests at age 21. Most people who have regular screenings with normal results can stop after age 65. Talk about this with your provider.  Breast cancer scan (mammogram): If you've ever had breasts, begin having regular mammograms starting at age 40. This is a scan to check for breast cancer.  Colon cancer screening: It is important to start screening for colon cancer at age 45.  Have a colonoscopy test every 10 years (or more often if you're at risk) Or, ask your provider about stool tests like a FIT test  every year or Cologuard test every 3 years.  To learn more about your testing options, visit: [email or web address].  For help making a decision, visit: https://bit.ly/bh53145.  Prostate cancer screening test: If you have a prostate and are age 55 to 69, ask your provider if you would benefit from a yearly prostate cancer screening test.  Lung cancer screening: If you are a current or former smoker age 50 to 80, ask your care team if ongoing lung cancer screenings are right for you.  For informational purposes only. Not to replace the advice of your health care provider. Copyright   2023 [employer]. All rights reserved. Clinically reviewed by the [facility] Transitions Program. [employer] 849632 - REV 01/24.    Learning About Stress  What is stress?     Stress is your body's response to a hard situation. Your body can have a physical, emotional, or mental response. Stress is a fact of life for most people, and it affects everyone differently. What causes stress for you may not be stressful for someone else.  A lot of things can cause stress. You may feel stress when you go on a job interview, take a test, or run a race. This kind of short-term stress is normal and even useful. It can help you if you need to work hard or react quickly. For example, stress can help you finish an important job on time.  Long-term stress is caused by ongoing stressful situations or events. Examples of long-term stress include long-term health problems, ongoing problems at work, or conflicts in your family. Long-term stress can harm your health.  How does stress affect your health?  When you are stressed, your body responds as though you are in danger. It makes hormones that speed up your heart, make you breathe faster, and give you a burst of energy. This is called the fight-or-flight stress response. If the stress is over quickly, your body goes back to normal and no harm is done.  But if stress  happens too often or lasts too long, it can have bad effects. Long-term stress can make you more likely to get sick, and it can make symptoms of some diseases worse. If you tense up when you are stressed, you may develop neck, shoulder, or low back pain. Stress is linked to high blood pressure and heart disease.  Stress also harms your emotional health. It can make you watters, tense, or depressed. Your relationships may suffer, and you may not do well at work or school.  What can you do to manage stress?  You can try these things to help manage stress:   Do something active. Exercise or activity can help reduce stress. Walking is a great way to get started. Even everyday activities such as housecleaning or yard work can help.  Try yoga or avani chi. These techniques combine exercise and meditation. You may need some training at first to learn them.  Do something you enjoy. For example, listen to music or go to a movie. Practice your hobby or do volunteer work.  Meditate. This can help you relax, because you are not worrying about what happened before or what may happen in the future.  Do guided imagery. Imagine yourself in any setting that helps you feel calm. You can use online videos, books, or a teacher to guide you.  Do breathing exercises. For example:  From a standing position, bend forward from the waist with your knees slightly bent. Let your arms dangle close to the floor.  Breathe in slowly and deeply as you return to a standing position. Roll up slowly and lift your head last.  Hold your breath for just a few seconds in the standing position.  Breathe out slowly and bend forward from the waist.  Let your feelings out. Talk, laugh, cry, and express anger when you need to. Talking with supportive friends or family, a counselor, or a deanna leader about your feelings is a healthy way to relieve stress. Avoid discussing your feelings with people who make you feel worse.  Write. It may help to write about things  "that are bothering you. This helps you find out how much stress you feel and what is causing it. When you know this, you can find better ways to cope.  What can you do to prevent stress?  You might try some of these things to help prevent stress:  Manage your time. This helps you find time to do the things you want and need to do.  Get enough sleep. Your body recovers from the stresses of the day while you are sleeping.  Get support. Your family, friends, and community can make a difference in how you experience stress.  Limit your news feed. Avoid or limit time on social media or news that may make you feel stressed.  Do something active. Exercise or activity can help reduce stress. Walking is a great way to get started.  Where can you learn more?  Go to https://www.[employer].net/patiented  Enter N032 in the search box to learn more about \"Learning About Stress.\"  Current as of: February 26, 2023               Content Version: 13.8    2500-9010 [employer].   Care instructions adapted under license by your healthcare professional. If you have questions about a medical condition or this instruction, always ask your healthcare professional. [employer] disclaims any warranty or liability for your use of this information.      "

## 2024-02-27 NOTE — LETTER
February 27, 2024      Madi Padilla  [address] [place] [id number]        To Whom It May Concern,       I, Kaylan Neville MD am licensed in the [facility] as a medical doctor.  My license number is [id number].  I am the physician of Madi Padilla, YOB: 1975.  I have a doctor-patient relationship and have been treating her for her gender dysphoria.  I affirmed that she has had appropriate clinical treatment for gender transition to the new gender of female.    I declare under penalty of perjury under the laws of the [place] that the foregoing is true and correct.        Sincerely,        Kaylan Neville MD

## 2024-02-27 NOTE — PROGRESS NOTES
Preventive Care Visit  [facility]  Kaylan Neville MD, Family Medicine  Feb 27, 2024    Assessment & Plan   Problem List Items Addressed This Visit       Transsexualism    Relevant Medications    estradiol (ESTRACE) 2 MG tablet    Tobacco abuse counseling    Relevant Medications    nicotine (NICORETTE) 4 MG lozenge    Bipolar 2 disorder (H)    Relevant Medications    QUEtiapine (SEROQUEL) 25 MG tablet    Mild persistent asthma without complication    Relevant Medications    albuterol (PROAIR HFA/PROVENTIL HFA/VENTOLIN HFA) 108 (90 Base) MCG/ACT inhaler    tiotropium (SPIRIVA HANDIHALER) 18 MCG inhaled capsule    Chronic bilateral low back pain without sciatica    Relevant Medications    ibuprofen (ADVIL/MOTRIN) 800 MG tablet    Other Relevant Orders    Physical Therapy  Referral    Hyperlipidemia LDL goal <100    Relevant Orders    Lipid panel reflex to direct LDL Non-fasting     Other Visit Diagnoses       Screen for colon cancer    -  Primary    Relevant Orders    Fecal colorectal cancer screen FIT - Future (S+30)    Chronic bronchitis, unspecified chronic bronchitis type (H)        Relevant Medications    albuterol (PROAIR HFA/PROVENTIL HFA/VENTOLIN HFA) 108 (90 Base) MCG/ACT inhaler    Hyperglycemia        Relevant Orders    Hemoglobin A1c    Screening for lung cancer        Relevant Orders    Prof fee: Shared Decision Making for Lung Cancer Screening (Completed)    CT Chest Lung Cancer Scrn Low Dose wo    Personal history of tobacco use        Relevant Orders    Prof fee: Shared Decision Making for Lung Cancer Screening (Completed)    CT Chest Lung Cancer Scrn Low Dose wo    Male-to-female transgender person        Encounter for therapeutic drug monitoring        Routine general medical examination at a health care facility        Need for vaccination               Transgender patient is now back on their estrogen following very neovagina surgery.  The report surgery went  well and they have healed up nicely.  They are still planning to have top surgery.  Will get an updated estradiol level and renewed patient's estrogen.    Personal history of tobacco abuse patient is a candidate for lung cancer screening.  Discussed with patient today and will place order.    Clinic for colon cancer patient would prefer to do FIT testing.  This is ordered today.    Patient has had elevated blood sugar we will check a hemoglobin A1c    Hyperlipidemia we will get an updated lipid panel.    Tobacco cessation encouraged will renew patient's Nicorette lozenges    Bipolar disorder patient is on quetiapine to be helpful.  Renewed today.    Mild persistent asthma renewed Spiriva and albuterol.    Will have patient follow-up in 3 months, they are planning to move to [place] in June and so would like to make sure we have patient seen in clinic to make sure they have medications updated prior to moving.    Chronic low back pain referral placed to physical therapy and provided prescription for ibuprofen.                Counseling  Appropriate preventive services were discussed with this patient, including applicable screening as appropriate for fall prevention, nutrition, physical activity, Tobacco-use cessation, weight loss and cognition.  Checklist reviewing preventive services available has been given to the patient.  Reviewed patient's diet, addressing concerns and/or questions.   She is at risk for lack of exercise and has been provided with information to increase physical activity for the benefit of her well-being.   The patient was instructed to see the dentist every 6 months.   She is at risk for psychosocial distress and has been provided with information to reduce risk.           Verna Alvarez is a 48 year old, presenting for the following:  Physical        2/27/2024     2:14 PM   Additional Questions   Roomed by [facility] Care Directive  Patient does not have a Health Care Directive  or Living Will: Discussed advance care planning with patient; information given to patient to review.    HPI  Here for wellness visit            2024   General Health   How would you rate your overall physical health? Excellent   Feel stress (tense, anxious, or unable to sleep) Only a little   (!) STRESS CONCERN      2024   Nutrition   Three or more servings of calcium each day? (!) NO   Diet: I don't know   How many servings of fruit and vegetables per day? (!) 0-1   How many sweetened beverages each day? 0-1         2024   Exercise   Days per week of moderate/strenous exercise 1 day   Average minutes spent exercising at this level 10 min   (!) EXERCISE CONCERN      2024   Social Factors   Frequency of gathering with friends or relatives Once a week   Worry food won't last until get money to buy more Yes   Food not last or not have enough money for food? Yes   Do you have housing?  Yes   Are you worried about losing your housing? No   Lack of transportation? No   Unable to get utilities (heat,electricity)? No   (!) FOOD SECURITY CONCERN PRESENT      2024   Fall Risk   Fallen 2 or more times in the past year? No   Trouble with walking or balance? No          2024   Dental   Dentist two times every year? (!) NO         2024   TB Screening   Were you born outside of [place]?  No         Today's PHQ-9 Score:       2023     2:18 PM   PHQ-9 SCORE   PHQ-9 Total Score MyChart 3 (Minimal depression)   PHQ-9 Total Score 3           2024   Substance Use   Alcohol more than 3/day or more than 7/wk Not Applicable   Do you use any other substances recreationally? No     Social History     Tobacco Use    Smoking status: Former     Packs/day: 0.50     Years: 10.00     Additional pack years: 0.00     Total pack years: 5.00     Types: Cigarettes     Start date: 2/3/1980     Quit date: 2022     Years since quittin.0    Smokeless tobacco: Never    Tobacco comments:     Currently uses  "lozenges   Vaping Use    Vaping Use: Never used   Substance Use Topics    Alcohol use: Not Currently    Drug use: Never           2/20/2024   STI Screening   New sexual partner(s) since last STI/HIV test? No   ASCVD Risk   The 10-year ASCVD risk score (Sylvia PACE, et al., 2019) is: 2.5%    Values used to calculate the score:      Age: 48 years      Sex: Male      Is Non- : No      Diabetic: No      Tobacco smoker: No      Systolic Blood Pressure: 115 mmHg      Is BP treated: No      HDL Cholesterol: 53 mg/dL      Total Cholesterol: 188 mg/dL           Reviewed and updated as needed this visit by Provider   Tobacco  Allergies  Meds  Problems  Med Hx  Surg Hx  Fam Hx            Past Medical History:   Diagnosis Date    COPD without exacerbation (H)     Mild persistent asthma without complication 9/12/2023     Past Surgical History:   Procedure Laterality Date    EYE MUSCLE SURGERY      ORCHIECTOMY SCROTAL BILATERAL      TOOTH EXTRACTION      TRANSGENDER VAGINOPLASTY ZERO DEPTH N/A 10/9/2023    Procedure: Minimal Depth Vaginoplasty;  Surgeon: Golden Lucero MD;  Location: [facility] OR    VASECTOMY              Objective    Exam  /75 (BP Location: Right arm, Patient Position: Sitting)   Pulse 76   Resp 16   Wt 71.7 kg (158 lb)   SpO2 97%   BMI 25.50 kg/m     Estimated body mass index is 25.5 kg/m  as calculated from the following:    Height as of 11/20/23: 1.676 m (5' 6\").    Weight as of this encounter: 71.7 kg (158 lb).    Physical Exam        General: alert and oriented ×3 no acute distress.    HEENT: pupils are equal round and reactive to light extraocular motion is intact. Normocephalic and atraumatic.     Hearing is grossly normal and there is no otorrhea.     Chest: has bilateral rise with no increased work of breathing.  ctab    Cardiovascular: normal perfusion and brisk capillary refill.s1s2    Musculoskeletal: no gross focal abnormalities and normal gait.    Neuro: " no gross focal abnormalities and memory seems intact.    Psychiatric: speech is clear and coherent and fluent. Patient dressed appropriately for the weather. Mood is appropriate and affect is full.                Signed Electronically by: Kaylan Neville MD  Lung Cancer Screening Shared Decision Making Visit     Madi Padilla, a 48 year old transgender female, is eligible for lung cancer screening    History   Smoking Status    Former    Packs/day: 0.50    Years: 10.00    Types: Cigarettes    Start date: 2/3/1980    Quit date: 2/23/2022   Smokeless Tobacco    Never       I have discussed with patient the risks and benefits of screening for lung cancer with low-dose CT.     The risks include:    radiation exposure: one low dose chest CT has as much ionizing radiation as about 15 chest x-rays, or 6 months of background radiation living in [place]      false positives: most findings/nodules are NOT cancer, but some might still require additional diagnostic evaluation, including biopsy    over-diagnosis: some slow growing cancers that might never have been clinically significant will be detected and treated unnecessarily     The benefit of early detection of lung cancer is contingent upon adherence to annual screening or more frequent follow up if indicated.     Furthermore, to benefit from screening, Madi must be willing and able to undergo diagnostic procedures, if indicated. Although no specific guide is available for determining severity of comorbidities, it is reasonable to withhold screening in patients who have greater mortality risk from other diseases.     We did discuss that the best way to prevent lung cancer is to not smoke.    Some patients may value a numeric estimation of lung cancer risk when evaluating if lung cancer screening is right for them, here is one calculator:    ShouldIScreen

## 2024-02-28 LAB
CHOLEST SERPL-MCNC: 160 MG/DL
ESTRADIOL SERPL-MCNC: 35 PG/ML
FASTING STATUS PATIENT QL REPORTED: ABNORMAL
HDLC SERPL-MCNC: 38 MG/DL
LDLC SERPL CALC-MCNC: 97 MG/DL
NONHDLC SERPL-MCNC: 122 MG/DL
TRIGL SERPL-MCNC: 124 MG/DL

## 2024-02-29 ENCOUNTER — LAB (OUTPATIENT)
Dept: LAB | Facility: CLINIC | Age: 49
End: 2024-02-29
Payer: COMMERCIAL

## 2024-02-29 DIAGNOSIS — Z12.11 SCREEN FOR COLON CANCER: ICD-10-CM

## 2024-02-29 PROCEDURE — 82274 ASSAY TEST FOR BLOOD FECAL: CPT

## 2024-03-05 ENCOUNTER — TELEPHONE (OUTPATIENT)
Dept: FAMILY MEDICINE | Facility: CLINIC | Age: 49
End: 2024-03-05
Payer: COMMERCIAL

## 2024-03-05 DIAGNOSIS — M25.551 HIP PAIN, RIGHT: Primary | ICD-10-CM

## 2024-03-05 NOTE — TELEPHONE ENCOUNTER
Order/Referral Request    Who is requesting: Rosina Parmar PT    Orders being requested: R Hip Xray    Reason service is needed/diagnosis: really restricted and painful to pt    When are orders needed by: as soon as able    Has this been discussed with Provider: Yes    Does patient have a preference on a Group/Provider/Facility? [facility]    Does patient have an appointment scheduled?: No    Where to send orders: Place orders within Erendira Parmar would like a call back from Dr. Neville to discuss provider recommendations.  Patient has not been informed that this is being addressed as Rosina did not want to concern the patient that might cause further distress.    Rosina/Maria Elena Parmar Ph: [phone]    If ordered; please route to Clinic Pool to have pt scheduled

## 2024-03-05 NOTE — CONFIDENTIAL NOTE
Please contact patient and let her know that physical therapy recommended we get a x-ray of her hip.  I placed an order.  Please help her get scheduled to come in and get the hip x-ray done.  Thanks

## 2024-03-06 ENCOUNTER — ANCILLARY PROCEDURE (OUTPATIENT)
Dept: GENERAL RADIOLOGY | Facility: CLINIC | Age: 49
End: 2024-03-06
Attending: FAMILY MEDICINE
Payer: COMMERCIAL

## 2024-03-06 DIAGNOSIS — M25.551 HIP PAIN, RIGHT: ICD-10-CM

## 2024-03-06 LAB — HEMOCCULT STL QL IA: NEGATIVE

## 2024-03-06 PROCEDURE — 73502 X-RAY EXAM HIP UNI 2-3 VIEWS: CPT | Mod: TC | Performed by: RADIOLOGY

## 2024-04-02 ENCOUNTER — LAB (OUTPATIENT)
Dept: LAB | Facility: CLINIC | Age: 49
End: 2024-04-02
Payer: COMMERCIAL

## 2024-04-02 DIAGNOSIS — Z71.6 ENCOUNTER FOR SMOKING CESSATION COUNSELING: ICD-10-CM

## 2024-04-02 PROCEDURE — G0480 DRUG TEST DEF 1-7 CLASSES: HCPCS | Mod: 90

## 2024-04-15 ENCOUNTER — PRE VISIT (OUTPATIENT)
Dept: UROLOGY | Facility: CLINIC | Age: 49
End: 2024-04-15
Payer: COMMERCIAL

## 2024-04-15 NOTE — TELEPHONE ENCOUNTER
Reason for visit: 6 mo follow up post op     Dx/Hx/Sx: minimal depth vaginoplasty     Records/imaging/labs/orders: in epic     At Rooming: virtual visit

## 2024-04-24 ENCOUNTER — VIRTUAL VISIT (OUTPATIENT)
Dept: UROLOGY | Facility: CLINIC | Age: 49
End: 2024-04-24
Payer: COMMERCIAL

## 2024-04-24 VITALS — BODY MASS INDEX: 25.07 KG/M2 | HEIGHT: 66 IN | WEIGHT: 156 LBS

## 2024-04-24 DIAGNOSIS — F64.9 GENDER DYSPHORIA: Primary | ICD-10-CM

## 2024-04-24 PROCEDURE — 99213 OFFICE O/P EST LOW 20 MIN: CPT | Mod: 95 | Performed by: UROLOGY

## 2024-04-24 ASSESSMENT — PAIN SCALES - GENERAL: PAINLEVEL: NO PAIN (0)

## 2024-04-24 NOTE — NURSING NOTE
Is the patient currently in the state of [place]? YES    Visit mode:VIDEO    If the visit is dropped, the patient can be reconnected by: VIDEO VISIT: Text to cell phone:   Telephone Information:   Mobile [phone]       Will anyone else be joining the visit? NO  (If patient encounters technical issues they should call [phone]    How would you like to obtain your AVS? MyChart    Are changes needed to the allergy or medication list? No    Are refills needed on medications prescribed by this physician? NO    Reason for visit: RECHECK    Samra RAMIREZ

## 2024-04-24 NOTE — PROGRESS NOTES
Virtual Visit Details    Type of service:  Video Visit   Video Start Time: 115p  Video End Time:130p    Originating Location (pt. Location): Home    Distant Location (provider location):  Off-site  Platform used for Video Visit: Marv    Virtual visit  1:00p-1:15p  Marv  I'm off site.  Patient at home.    Kim Padilla is a 48 year old transgender female.  Underwent minimal depth vaginoplasty 6 months ago.   Very happy with results.  Voiding well  Happy with aesthetic outcome.  Has sensation/orgasmic function.  Seeking top surgery.    Vital signs reviewed      Exam:           Constitutional - No apparent distress. Patient of stated age.               Eyes - no redness or discharge.              Respiratory - no cough. no labored breathing              Musculoskeletal - full range of motion in all extremities              Skin - no visible skin discoloration or lesions              Neurological - no tremors              Psychiatric - no anxiety, alert & oriented                A/P   Excellent outcome after vulvoplasty.  Followup PRN  Seeking Top surgery and already has apt for that.    Golden Lucero MD

## 2024-05-15 ENCOUNTER — MYC REFILL (OUTPATIENT)
Dept: FAMILY MEDICINE | Facility: CLINIC | Age: 49
End: 2024-05-15
Payer: COMMERCIAL

## 2024-05-15 DIAGNOSIS — J42 CHRONIC BRONCHITIS, UNSPECIFIED CHRONIC BRONCHITIS TYPE (H): ICD-10-CM

## 2024-05-15 DIAGNOSIS — J45.30 MILD PERSISTENT ASTHMA WITHOUT COMPLICATION: ICD-10-CM

## 2024-05-15 DIAGNOSIS — G89.29 CHRONIC BILATERAL LOW BACK PAIN WITHOUT SCIATICA: ICD-10-CM

## 2024-05-15 DIAGNOSIS — M54.50 CHRONIC BILATERAL LOW BACK PAIN WITHOUT SCIATICA: ICD-10-CM

## 2024-05-15 DIAGNOSIS — F31.81 BIPOLAR 2 DISORDER (H): ICD-10-CM

## 2024-05-15 DIAGNOSIS — F64.0 TRANSSEXUALISM: ICD-10-CM

## 2024-05-15 DIAGNOSIS — R52 PAIN: ICD-10-CM

## 2024-05-15 RX ORDER — IBUPROFEN 800 MG/1
800 TABLET, FILM COATED ORAL EVERY 8 HOURS PRN
Qty: 30 TABLET | Refills: 5 | Status: SHIPPED | OUTPATIENT
Start: 2024-05-15 | End: 2024-08-07

## 2024-05-15 RX ORDER — QUETIAPINE FUMARATE 25 MG/1
25 TABLET, FILM COATED ORAL 2 TIMES DAILY
Qty: 180 TABLET | Refills: 1 | Status: SHIPPED | OUTPATIENT
Start: 2024-05-15 | End: 2024-07-03

## 2024-05-15 RX ORDER — ACETAMINOPHEN 325 MG/1
325-650 TABLET ORAL EVERY 6 HOURS PRN
Qty: 60 TABLET | Refills: 1 | Status: SHIPPED | OUTPATIENT
Start: 2024-05-15

## 2024-05-15 RX ORDER — ALBUTEROL SULFATE 90 UG/1
2 AEROSOL, METERED RESPIRATORY (INHALATION) EVERY 4 HOURS PRN
Qty: 36 G | Refills: 1 | Status: SHIPPED | OUTPATIENT
Start: 2024-05-15 | End: 2024-08-07

## 2024-05-15 RX ORDER — ESTRADIOL 2 MG/1
TABLET ORAL
Qty: 120 TABLET | Refills: 2 | Status: SHIPPED | OUTPATIENT
Start: 2024-05-15 | End: 2024-07-03

## 2024-05-15 RX ORDER — MOMETASONE FUROATE AND FORMOTEROL FUMARATE DIHYDRATE 100; 5 UG/1; UG/1
2 AEROSOL RESPIRATORY (INHALATION) 2 TIMES DAILY
Qty: 39 G | Refills: 3 | Status: SHIPPED | OUTPATIENT
Start: 2024-05-15 | End: 2024-07-03

## 2024-05-15 NOTE — TELEPHONE ENCOUNTER
Routing refill request to provider for review/approval because:  Drugs fail the [facility] refill protocol       ACT sent via my chart    Last Written Prescription Date:  2/27/24   Last office visit: 2/27/2024

## 2024-07-02 ASSESSMENT — PATIENT HEALTH QUESTIONNAIRE - PHQ9
SUM OF ALL RESPONSES TO PHQ QUESTIONS 1-9: 6
SUM OF ALL RESPONSES TO PHQ QUESTIONS 1-9: 6
10. IF YOU CHECKED OFF ANY PROBLEMS, HOW DIFFICULT HAVE THESE PROBLEMS MADE IT FOR YOU TO DO YOUR WORK, TAKE CARE OF THINGS AT HOME, OR GET ALONG WITH OTHER PEOPLE: SOMEWHAT DIFFICULT

## 2024-07-03 ENCOUNTER — OFFICE VISIT (OUTPATIENT)
Dept: FAMILY MEDICINE | Facility: CLINIC | Age: 49
End: 2024-07-03
Payer: COMMERCIAL

## 2024-07-03 VITALS
WEIGHT: 157 LBS | TEMPERATURE: 98 F | HEIGHT: 66 IN | BODY MASS INDEX: 25.23 KG/M2 | OXYGEN SATURATION: 97 % | RESPIRATION RATE: 16 BRPM | DIASTOLIC BLOOD PRESSURE: 60 MMHG | SYSTOLIC BLOOD PRESSURE: 100 MMHG | HEART RATE: 79 BPM

## 2024-07-03 DIAGNOSIS — F31.81 BIPOLAR 2 DISORDER (H): ICD-10-CM

## 2024-07-03 DIAGNOSIS — F64.9 GENDER DYSPHORIA: ICD-10-CM

## 2024-07-03 DIAGNOSIS — J45.30 MILD PERSISTENT ASTHMA WITHOUT COMPLICATION: ICD-10-CM

## 2024-07-03 DIAGNOSIS — F64.0 TRANSSEXUALISM: ICD-10-CM

## 2024-07-03 DIAGNOSIS — Z23 NEED FOR VACCINATION: Primary | ICD-10-CM

## 2024-07-03 PROCEDURE — 99214 OFFICE O/P EST MOD 30 MIN: CPT | Performed by: FAMILY MEDICINE

## 2024-07-03 PROCEDURE — G2211 COMPLEX E/M VISIT ADD ON: HCPCS | Performed by: FAMILY MEDICINE

## 2024-07-03 RX ORDER — DIVALPROEX SODIUM 250 MG/1
250 TABLET, EXTENDED RELEASE ORAL DAILY
Qty: 30 TABLET | Refills: 1 | Status: SHIPPED | OUTPATIENT
Start: 2024-07-03 | End: 2024-08-07

## 2024-07-03 RX ORDER — ESTRADIOL 2 MG/1
TABLET ORAL
Qty: 120 TABLET | Refills: 2 | Status: SHIPPED | OUTPATIENT
Start: 2024-07-03 | End: 2024-08-07

## 2024-07-03 RX ORDER — MOMETASONE FUROATE AND FORMOTEROL FUMARATE DIHYDRATE 100; 5 UG/1; UG/1
2 AEROSOL RESPIRATORY (INHALATION) 2 TIMES DAILY
Qty: 39 G | Refills: 3 | Status: SHIPPED | OUTPATIENT
Start: 2024-07-03 | End: 2024-08-07

## 2024-07-03 RX ORDER — QUETIAPINE FUMARATE 25 MG/1
25 TABLET, FILM COATED ORAL 2 TIMES DAILY
Qty: 180 TABLET | Refills: 1 | Status: SHIPPED | OUTPATIENT
Start: 2024-07-03 | End: 2024-08-07

## 2024-07-03 RX ORDER — TIOTROPIUM BROMIDE 18 UG/1
CAPSULE ORAL; RESPIRATORY (INHALATION)
Qty: 90 CAPSULE | Refills: 3 | Status: SHIPPED | OUTPATIENT
Start: 2024-07-03 | End: 2024-08-07

## 2024-07-03 NOTE — PROGRESS NOTES
Assessment & Plan   Problem List Items Addressed This Visit       Transsexualism    Relevant Medications    estradiol (ESTRACE) 2 MG tablet    Gender dysphoria    Bipolar 2 disorder (H)    Relevant Medications    divalproex sodium extended-release (DEPAKOTE ER) 250 MG 24 hr tablet    QUEtiapine (SEROQUEL) 25 MG tablet    Mild persistent asthma without complication    Relevant Medications    mometasone-formoterol (DULERA) 100-5 MCG/ACT inhaler    Spacer/Aero-Holding Chambers (SPACER/AERO-HOLD CHAMBER MASK) CONSUELO    tiotropium (SPIRIVA HANDIHALER) 18 MCG inhaled capsule     Other Visit Diagnoses       Need for vaccination    -  Primary    Relevant Orders    HEPATITIS B VACCINE (20 YEARS AND OLDER) (ENGERIX-B/RECOMBIVAX) (Completed)           Gender dysphoria patient reports that they have a hard time remembering to take their estradiol pills and would strongly prefer to go back onto Depo estradiol.  For now we will have patient continue with their oral estradiol and when we get back together next month we can discuss switching patient to Depo estrogen.    Bipolar disorder patient's been struggling with depression.  They do not recall ever being on Depakote before.  Would like to start some Depakote before we try adding something like fluoxetine.  They want something is not going to make her feel sleepy.  They also continue to work with their therapist.  We are going to follow-up in 1 month.    Due for hepatitis B vaccine patient would like to get this updated today order provided    Mild persistent asthma versus COPD renewed Dulera and Spiriva.    The longitudinal plan of care for the diagnosis(es)/condition(s) as documented were addressed during this visit. Due to the added complexity in care, I will continue to support Kim in the subsequent management and with ongoing continuity of care.        BMI  Estimated body mass index is 25.34 kg/m  as calculated from the following:    Height as of this encounter: 1.676 m  "(5' 6\").    Weight as of this encounter: 71.2 kg (157 lb).   Weight management plan: Discussed healthy diet and exercise guidelines          Verna Alvarez is a 48 year old, presenting for the following health issues:  Recheck Medication (ACT follow up due. )        7/3/2024     1:06 PM   Additional Questions   Roomed by Lynne     History of Present Illness       Reason for visit:  Check up    She eats 0-1 servings of fruits and vegetables daily.She consumes 2 sweetened beverage(s) daily.She exercises with enough effort to increase her heart rate 9 or less minutes per day.  She exercises with enough effort to increase her heart rate 3 or less days per week. She is missing 1 dose(s) of medications per week.  She is not taking prescribed medications regularly due to remembering to take.                     Objective    /60 (BP Location: Right arm, Patient Position: Sitting)   Pulse 79   Temp 98  F (36.7  C) (Tympanic)   Resp 16   Ht 1.676 m (5' 6\")   Wt 71.2 kg (157 lb)   SpO2 97%   BMI 25.34 kg/m    Body mass index is 25.34 kg/m .  Physical Exam               Signed Electronically by: Kaylan Neville MD    "

## 2024-07-09 ENCOUNTER — TRANSFERRED RECORDS (OUTPATIENT)
Dept: HEALTH INFORMATION MANAGEMENT | Facility: CLINIC | Age: 49
End: 2024-07-09
Payer: COMMERCIAL

## 2024-07-09 ENCOUNTER — MEDICAL CORRESPONDENCE (OUTPATIENT)
Dept: HEALTH INFORMATION MANAGEMENT | Facility: CLINIC | Age: 49
End: 2024-07-09

## 2024-07-17 DIAGNOSIS — Z87.891 PERSONAL HISTORY OF TOBACCO USE, PRESENTING HAZARDS TO HEALTH: Primary | ICD-10-CM

## 2024-07-17 DIAGNOSIS — F64.9 GENDER DYSPHORIA: ICD-10-CM

## 2024-07-23 ENCOUNTER — LAB (OUTPATIENT)
Dept: LAB | Facility: CLINIC | Age: 49
End: 2024-07-23
Payer: COMMERCIAL

## 2024-07-23 DIAGNOSIS — F64.9 GENDER DYSPHORIA: ICD-10-CM

## 2024-07-23 DIAGNOSIS — Z87.891 PERSONAL HISTORY OF TOBACCO USE, PRESENTING HAZARDS TO HEALTH: ICD-10-CM

## 2024-07-23 PROCEDURE — G0480 DRUG TEST DEF 1-7 CLASSES: HCPCS | Mod: 90

## 2024-07-26 ENCOUNTER — DOCUMENTATION ONLY (OUTPATIENT)
Dept: PLASTIC SURGERY | Facility: CLINIC | Age: 49
End: 2024-07-26
Payer: COMMERCIAL

## 2024-07-26 NOTE — PROGRESS NOTES
[facility] :  Care Coordination Note     SITUATION   Kim Padilla is a 48 year old adult who is receiving support for:  Care Team (LOS Review)  .    BACKGROUND     Breast Augmentation  LOS meets criteria (media tab 7/10/24). Pt will need DA. Sent message to patient requesting copy of DA.    Vaginoplasty  LOS on file will need update to surgeon's name to be used for vaginoplasty PA. Pt will also need a second LOS.    ASSESSMENT     Surgery              [facility] Assessment  [facility] ([facility] Enrollment: Enrolled  Patient has a therapist: Yes  Letter of support #1: Received  Letter #1 Date: 07/09/24  Letter of support #2: Requested  Surgery being considered: Yes  Augmentation: Yes  Vaginoplasty: Yes      PLAN          Nursing Interventions:       Follow-up plan:  Upload DA.       CHERYL Vee    Nutrition Plan:    Continue with Neosure formula, mixed to 24 calories per ounce.   Will provide mixing in my chart message   Aim for up to 4 ounces q4h - 6 bottles in 24 hour period.     Continue infant multivitamin once daily- polyvisol with iron  Offer 1 ml once daily     Guidelines for Storage of Powdered Formula:   Prepared formula in bottle should be discarded within 1 hour after feeding begins   Formula prepared from powder should be kept in refrigerator no more than 24 hours   Formula prepared from powder should be kept at room temperature no more than 2 hours     Follow-up October 25, at 8am.    Shadia Lazaro, MS RDN LDN  Pediatric Dietitian  [facility]   A: [address], [place], [place]; [address]th Floor - Left [facility]  Ph: [phone]  Fx: [phone]    Stay Well, Stay Healthy!

## 2024-08-07 ENCOUNTER — TELEPHONE (OUTPATIENT)
Dept: PLASTIC SURGERY | Facility: CLINIC | Age: 49
End: 2024-08-07

## 2024-08-07 ENCOUNTER — VIRTUAL VISIT (OUTPATIENT)
Dept: FAMILY MEDICINE | Facility: CLINIC | Age: 49
End: 2024-08-07
Payer: MEDICAID

## 2024-08-07 DIAGNOSIS — F64.9 GENDER DYSPHORIA: ICD-10-CM

## 2024-08-07 DIAGNOSIS — G89.29 CHRONIC BILATERAL LOW BACK PAIN WITHOUT SCIATICA: ICD-10-CM

## 2024-08-07 DIAGNOSIS — J45.30 MILD PERSISTENT ASTHMA WITHOUT COMPLICATION: ICD-10-CM

## 2024-08-07 DIAGNOSIS — M54.50 CHRONIC BILATERAL LOW BACK PAIN WITHOUT SCIATICA: ICD-10-CM

## 2024-08-07 DIAGNOSIS — F31.81 BIPOLAR 2 DISORDER (H): ICD-10-CM

## 2024-08-07 DIAGNOSIS — Z51.81 ENCOUNTER FOR THERAPEUTIC DRUG MONITORING: Primary | ICD-10-CM

## 2024-08-07 DIAGNOSIS — J42 CHRONIC BRONCHITIS, UNSPECIFIED CHRONIC BRONCHITIS TYPE (H): ICD-10-CM

## 2024-08-07 DIAGNOSIS — Z79.899 MEDICATION MANAGEMENT: ICD-10-CM

## 2024-08-07 PROCEDURE — 99215 OFFICE O/P EST HI 40 MIN: CPT | Mod: 95 | Performed by: FAMILY MEDICINE

## 2024-08-07 PROCEDURE — G2211 COMPLEX E/M VISIT ADD ON: HCPCS | Mod: 95 | Performed by: FAMILY MEDICINE

## 2024-08-07 RX ORDER — QUETIAPINE FUMARATE 50 MG/1
50 TABLET, EXTENDED RELEASE ORAL AT BEDTIME
Qty: 90 TABLET | Refills: 1 | Status: SHIPPED | OUTPATIENT
Start: 2024-08-07

## 2024-08-07 RX ORDER — TIOTROPIUM BROMIDE 18 UG/1
CAPSULE ORAL; RESPIRATORY (INHALATION)
Qty: 90 CAPSULE | Refills: 3 | Status: SHIPPED | OUTPATIENT
Start: 2024-08-07

## 2024-08-07 RX ORDER — IBUPROFEN 800 MG/1
800 TABLET, FILM COATED ORAL EVERY 8 HOURS PRN
Qty: 30 TABLET | Refills: 5 | Status: SHIPPED | OUTPATIENT
Start: 2024-08-07

## 2024-08-07 RX ORDER — ESTRADIOL VALERATE 10 MG/ML
2 INJECTION INTRAMUSCULAR WEEKLY
Qty: 5 ML | Refills: 0 | Status: SHIPPED | OUTPATIENT
Start: 2024-08-07

## 2024-08-07 RX ORDER — NEEDLES, SAFETY 18GX1 1/2"
NEEDLE, DISPOSABLE MISCELLANEOUS
Qty: 25 EACH | Refills: 2 | Status: SHIPPED | OUTPATIENT
Start: 2024-08-07

## 2024-08-07 RX ORDER — SYRINGE, DISPOSABLE, 1 ML
1 SYRINGE, EMPTY DISPOSABLE MISCELLANEOUS
Qty: 25 EACH | Refills: 3 | Status: SHIPPED | OUTPATIENT
Start: 2024-08-07

## 2024-08-07 RX ORDER — MOMETASONE FUROATE AND FORMOTEROL FUMARATE DIHYDRATE 100; 5 UG/1; UG/1
2 AEROSOL RESPIRATORY (INHALATION) 2 TIMES DAILY
Qty: 39 G | Refills: 3 | Status: SHIPPED | OUTPATIENT
Start: 2024-08-07

## 2024-08-07 RX ORDER — ALBUTEROL SULFATE 90 UG/1
2 AEROSOL, METERED RESPIRATORY (INHALATION) EVERY 4 HOURS PRN
Qty: 36 G | Refills: 1 | Status: SHIPPED | OUTPATIENT
Start: 2024-08-07

## 2024-08-07 RX ORDER — DIVALPROEX SODIUM 250 MG/1
250 TABLET, EXTENDED RELEASE ORAL DAILY
Qty: 90 TABLET | Refills: 1 | Status: SHIPPED | OUTPATIENT
Start: 2024-08-07

## 2024-08-07 NOTE — PROGRESS NOTES
"Kim is a 48 year old who is being evaluated via a billable video visit.          Assessment & Plan   Problem List Items Addressed This Visit       Gender dysphoria    Relevant Medications    estradiol valerate (DELESTROGEN) 10 MG/ML OIL injection    syringe, disposable, (B-D SYRINGE LUER-NEETA) 1 ML MISC    needle, disp, (BD ECLIPSE NEEDLE) 21G X 1\" MISC    Other Relevant Orders    Estradiol    CBC with Platelets & Differential    Comprehensive metabolic panel (BMP + Alb, Alk Phos, ALT, AST, Total. Bili, TP)    Bipolar 2 disorder (H)    Relevant Medications    divalproex sodium extended-release (DEPAKOTE ER) 250 MG 24 hr tablet    QUEtiapine (SEROQUEL XR) 50 MG TB24 24 hr tablet    Mild persistent asthma without complication    Relevant Medications    albuterol (PROAIR HFA/PROVENTIL HFA/VENTOLIN HFA) 108 (90 Base) MCG/ACT inhaler    mometasone-formoterol (DULERA) 100-5 MCG/ACT inhaler    tiotropium (SPIRIVA HANDIHALER) 18 MCG inhaled capsule    Chronic bilateral low back pain without sciatica    Relevant Medications    ibuprofen (ADVIL/MOTRIN) 800 MG tablet     Other Visit Diagnoses       Encounter for therapeutic drug monitoring    -  Primary    Chronic bronchitis, unspecified chronic bronchitis type (H)        Relevant Medications    albuterol (PROAIR HFA/PROVENTIL HFA/VENTOLIN HFA) 108 (90 Base) MCG/ACT inhaler    Medication management        Relevant Orders    CBC with Platelets & Differential    Comprehensive metabolic panel (BMP + Alb, Alk Phos, ALT, AST, Total. Bili, TP)           Gender dysphoria patient would like to go back to intramuscular injections.  Gender dysphoria patient would like to go back to intramuscular injections.  They find that this is easier for them to remember to take.  Would like patient to get labs drawn [facility] between her second and third injections and then a follow-up with me about a week later so we can adjust medications if needed.  Ordering estradiol as noted above after talking " with her pharmacy to see what type of estrogen they are available to get for patient    Asthma versus COPD patient would like prescription sent to their local pharmacy rather than [facility] here in town.  New prescription sent.  Currently uncomplicated    Chronic low back pain renewed patient's ibuprofen encouraged her to take this with food.      The longitudinal plan of care for the diagnosis(es)/condition(s) as documented were addressed during this visit. Due to the added complexity in care, I will continue to support Kim in the subsequent management and with ongoing continuity of care.      Total time spent reviewing chart and preparing for appointment, with patient for appointment, and time spent charting and coordinating care on the day of the appointment in minutes was:  45             Subjective   Kim is a 48 year old, presenting for the following health issues:  No chief complaint on file.      Video Start Time: 4:16 PM    History of Present Illness     Asthma:  She presents for follow up of asthma.  She has no cough, no wheezing, and no shortness of breath.  She is using a relief medication a few times a week. She does not miss any doses of her controller medication throughout the week. Patient is aware of the following triggers: cold air and emotions. The patient has not had a visit to the Emergency Room, Urgent Care or Hospital due to asthma since the last clinic visit.     She eats 0-1 servings of fruits and vegetables daily.She consumes 1 sweetened beverage(s) daily.She exercises with enough effort to increase her heart rate 9 or less minutes per day.  She exercises with enough effort to increase her heart rate 3 or less days per week. She is missing 1 dose(s) of medications per week.  She is not taking prescribed medications regularly due to remembering to take.                   Objective           Vitals:  No vitals were obtained today due to virtual visit.    Physical Exam   GENERAL: alert and no  distress  EYES: Eyes grossly normal to inspection.  No discharge or erythema, or obvious scleral/conjunctival abnormalities.  RESP: No audible wheeze, cough, or visible cyanosis.    SKIN: Visible skin clear. No significant rash, abnormal pigmentation or lesions.  NEURO: Cranial nerves grossly intact.  Mentation and speech appropriate for age.  PSYCH: Appropriate affect, tone, and pace of words          Video-Visit Details    Type of service:  Video Visit   Video End Time:4:58 PM  Originating Location (pt. Location): Home    Distant Location (provider location):  On-site  Platform used for Video Visit: Yadiel  Signed Electronically by: Kaylan Neville MD

## 2024-09-20 ENCOUNTER — TELEPHONE (OUTPATIENT)
Dept: PLASTIC SURGERY | Facility: CLINIC | Age: 49
End: 2024-09-20
Payer: MEDICAID

## 2024-09-24 ENCOUNTER — TELEPHONE (OUTPATIENT)
Dept: PLASTIC SURGERY | Facility: CLINIC | Age: 49
End: 2024-09-24
Payer: MEDICAID

## 2024-09-27 ENCOUNTER — TRANSFERRED RECORDS (OUTPATIENT)
Dept: HEALTH INFORMATION MANAGEMENT | Facility: CLINIC | Age: 49
End: 2024-09-27
Payer: MEDICAID

## 2024-10-08 ENCOUNTER — LAB (OUTPATIENT)
Dept: LAB | Facility: CLINIC | Age: 49
End: 2024-10-08
Payer: MEDICAID

## 2024-10-08 DIAGNOSIS — Z79.899 MEDICATION MANAGEMENT: ICD-10-CM

## 2024-10-08 DIAGNOSIS — F64.9 GENDER DYSPHORIA: ICD-10-CM

## 2024-10-08 LAB
BASOPHILS # BLD AUTO: 0 10E3/UL (ref 0–0.2)
BASOPHILS NFR BLD AUTO: 0 %
EOSINOPHIL # BLD AUTO: 0.2 10E3/UL (ref 0–0.7)
EOSINOPHIL NFR BLD AUTO: 2 %
ERYTHROCYTE [DISTWIDTH] IN BLOOD BY AUTOMATED COUNT: 12.5 % (ref 10–15)
HCT VFR BLD AUTO: 43.3 % (ref 35–53)
HGB BLD-MCNC: 13.9 G/DL (ref 11.7–17.7)
IMM GRANULOCYTES # BLD: 0 10E3/UL
IMM GRANULOCYTES NFR BLD: 0 %
LYMPHOCYTES # BLD AUTO: 2.4 10E3/UL (ref 0.8–5.3)
LYMPHOCYTES NFR BLD AUTO: 28 %
MCH RBC QN AUTO: 30.5 PG (ref 26.5–33)
MCHC RBC AUTO-ENTMCNC: 32.1 G/DL (ref 31.5–36.5)
MCV RBC AUTO: 95 FL (ref 78–100)
MONOCYTES # BLD AUTO: 0.8 10E3/UL (ref 0–1.3)
MONOCYTES NFR BLD AUTO: 9 %
NEUTROPHILS # BLD AUTO: 5.2 10E3/UL (ref 1.6–8.3)
NEUTROPHILS NFR BLD AUTO: 61 %
PLATELET # BLD AUTO: 220 10E3/UL (ref 150–450)
RBC # BLD AUTO: 4.56 10E6/UL (ref 3.8–5.9)
WBC # BLD AUTO: 8.5 10E3/UL (ref 4–11)

## 2024-10-08 PROCEDURE — 82670 ASSAY OF TOTAL ESTRADIOL: CPT

## 2024-10-08 PROCEDURE — 85025 COMPLETE CBC W/AUTO DIFF WBC: CPT | Mod: QW

## 2024-10-08 PROCEDURE — 80053 COMPREHEN METABOLIC PANEL: CPT

## 2024-10-08 PROCEDURE — 36415 COLL VENOUS BLD VENIPUNCTURE: CPT

## 2024-10-09 ENCOUNTER — TELEPHONE (OUTPATIENT)
Dept: PLASTIC SURGERY | Facility: CLINIC | Age: 49
End: 2024-10-09
Payer: MEDICAID

## 2024-10-09 LAB
ALBUMIN SERPL BCG-MCNC: 4.4 G/DL (ref 3.5–5.2)
ALP SERPL-CCNC: 68 U/L (ref 40–150)
ALT SERPL W P-5'-P-CCNC: 5 U/L (ref 0–70)
ANION GAP SERPL CALCULATED.3IONS-SCNC: 11 MMOL/L (ref 7–15)
AST SERPL W P-5'-P-CCNC: 20 U/L (ref 0–45)
BILIRUB SERPL-MCNC: 0.2 MG/DL
BUN SERPL-MCNC: 10.2 MG/DL (ref 6–20)
CALCIUM SERPL-MCNC: 9.3 MG/DL (ref 8.8–10.4)
CHLORIDE SERPL-SCNC: 101 MMOL/L (ref 98–107)
CREAT SERPL-MCNC: 0.76 MG/DL (ref 0.51–1.17)
EGFRCR SERPLBLD CKD-EPI 2021: >90 ML/MIN/1.73M2
ESTRADIOL SERPL-MCNC: 259 PG/ML
GLUCOSE SERPL-MCNC: 70 MG/DL (ref 70–99)
HCO3 SERPL-SCNC: 27 MMOL/L (ref 22–29)
POTASSIUM SERPL-SCNC: 4.5 MMOL/L (ref 3.4–5.3)
PROT SERPL-MCNC: 7.3 G/DL (ref 6.4–8.3)
SODIUM SERPL-SCNC: 139 MMOL/L (ref 135–145)

## 2024-10-23 ENCOUNTER — MYC REFILL (OUTPATIENT)
Dept: FAMILY MEDICINE | Facility: CLINIC | Age: 49
End: 2024-10-23
Payer: MEDICAID

## 2024-10-23 DIAGNOSIS — J42 CHRONIC BRONCHITIS, UNSPECIFIED CHRONIC BRONCHITIS TYPE (H): ICD-10-CM

## 2024-10-23 DIAGNOSIS — J45.30 MILD PERSISTENT ASTHMA WITHOUT COMPLICATION: ICD-10-CM

## 2024-10-23 DIAGNOSIS — G89.29 CHRONIC BILATERAL LOW BACK PAIN WITHOUT SCIATICA: ICD-10-CM

## 2024-10-23 DIAGNOSIS — F64.9 GENDER DYSPHORIA: ICD-10-CM

## 2024-10-23 DIAGNOSIS — R52 PAIN: ICD-10-CM

## 2024-10-23 DIAGNOSIS — F31.81 BIPOLAR 2 DISORDER (H): ICD-10-CM

## 2024-10-23 DIAGNOSIS — M54.50 CHRONIC BILATERAL LOW BACK PAIN WITHOUT SCIATICA: ICD-10-CM

## 2024-10-23 RX ORDER — IBUPROFEN 800 MG/1
800 TABLET, FILM COATED ORAL EVERY 8 HOURS PRN
Qty: 30 TABLET | Refills: 5 | OUTPATIENT
Start: 2024-10-23

## 2024-10-23 RX ORDER — MOMETASONE FUROATE AND FORMOTEROL FUMARATE DIHYDRATE 100; 5 UG/1; UG/1
2 AEROSOL RESPIRATORY (INHALATION) 2 TIMES DAILY
Qty: 39 G | Refills: 3 | OUTPATIENT
Start: 2024-10-23

## 2024-10-23 RX ORDER — DIVALPROEX SODIUM 250 MG/1
250 TABLET, FILM COATED, EXTENDED RELEASE ORAL DAILY
Qty: 90 TABLET | Refills: 1 | OUTPATIENT
Start: 2024-10-23

## 2024-10-23 RX ORDER — NEEDLES, SAFETY 18GX1 1/2"
NEEDLE, DISPOSABLE MISCELLANEOUS
Qty: 25 EACH | Refills: 2 | OUTPATIENT
Start: 2024-10-23

## 2024-10-23 RX ORDER — ACETAMINOPHEN 325 MG/1
325-650 TABLET ORAL EVERY 6 HOURS PRN
Qty: 60 TABLET | Refills: 1 | Status: SHIPPED | OUTPATIENT
Start: 2024-10-23

## 2024-10-23 RX ORDER — SYRINGE, DISPOSABLE, 1 ML
1 SYRINGE, EMPTY DISPOSABLE MISCELLANEOUS
Qty: 25 EACH | Refills: 3 | OUTPATIENT
Start: 2024-10-23

## 2024-10-23 RX ORDER — TIOTROPIUM BROMIDE 18 UG/1
CAPSULE ORAL; RESPIRATORY (INHALATION)
Qty: 90 CAPSULE | Refills: 3 | OUTPATIENT
Start: 2024-10-23

## 2024-10-23 RX ORDER — QUETIAPINE FUMARATE 50 MG/1
50 TABLET, EXTENDED RELEASE ORAL AT BEDTIME
Qty: 90 TABLET | Refills: 1 | OUTPATIENT
Start: 2024-10-23

## 2024-10-24 RX ORDER — ALBUTEROL SULFATE 90 UG/1
2 INHALANT RESPIRATORY (INHALATION) EVERY 4 HOURS PRN
Qty: 36 G | Refills: 1 | Status: SHIPPED | OUTPATIENT
Start: 2024-10-24

## 2024-10-24 RX ORDER — ESTRADIOL VALERATE 10 MG/ML
2 INJECTION INTRAMUSCULAR WEEKLY
Qty: 5 ML | Refills: 0 | Status: SHIPPED | OUTPATIENT
Start: 2024-10-24 | End: 2024-11-06

## 2024-11-06 ENCOUNTER — OFFICE VISIT (OUTPATIENT)
Dept: FAMILY MEDICINE | Facility: CLINIC | Age: 49
End: 2024-11-06
Payer: MEDICAID

## 2024-11-06 VITALS
DIASTOLIC BLOOD PRESSURE: 64 MMHG | BODY MASS INDEX: 25.01 KG/M2 | RESPIRATION RATE: 16 BRPM | WEIGHT: 155.6 LBS | TEMPERATURE: 98 F | OXYGEN SATURATION: 97 % | SYSTOLIC BLOOD PRESSURE: 104 MMHG | HEIGHT: 66 IN | HEART RATE: 71 BPM

## 2024-11-06 DIAGNOSIS — J43.1 PANLOBULAR EMPHYSEMA (H): ICD-10-CM

## 2024-11-06 DIAGNOSIS — F31.81 BIPOLAR 2 DISORDER (H): ICD-10-CM

## 2024-11-06 DIAGNOSIS — F64.9 GENDER DYSPHORIA: Primary | ICD-10-CM

## 2024-11-06 PROCEDURE — G2211 COMPLEX E/M VISIT ADD ON: HCPCS | Performed by: FAMILY MEDICINE

## 2024-11-06 PROCEDURE — 99214 OFFICE O/P EST MOD 30 MIN: CPT | Performed by: FAMILY MEDICINE

## 2024-11-06 RX ORDER — ESTRADIOL VALERATE 10 MG/ML
2 INJECTION INTRAMUSCULAR WEEKLY
Qty: 6 ML | Refills: 1 | Status: SHIPPED | OUTPATIENT
Start: 2024-11-06 | End: 2024-11-06

## 2024-11-06 RX ORDER — NEEDLES, SAFETY 18GX1 1/2"
NEEDLE, DISPOSABLE MISCELLANEOUS
Qty: 25 EACH | Refills: 2 | Status: SHIPPED | OUTPATIENT
Start: 2024-11-06

## 2024-11-06 RX ORDER — SYRINGE, DISPOSABLE, 1 ML
1 SYRINGE, EMPTY DISPOSABLE MISCELLANEOUS
Qty: 25 EACH | Refills: 3 | Status: SHIPPED | OUTPATIENT
Start: 2024-11-06

## 2024-11-06 RX ORDER — ESTRADIOL VALERATE 10 MG/ML
2 INJECTION INTRAMUSCULAR WEEKLY
Qty: 5 ML | Refills: 5 | Status: SHIPPED | OUTPATIENT
Start: 2024-11-06

## 2024-11-06 ASSESSMENT — PATIENT HEALTH QUESTIONNAIRE - PHQ9
SUM OF ALL RESPONSES TO PHQ QUESTIONS 1-9: 9
10. IF YOU CHECKED OFF ANY PROBLEMS, HOW DIFFICULT HAVE THESE PROBLEMS MADE IT FOR YOU TO DO YOUR WORK, TAKE CARE OF THINGS AT HOME, OR GET ALONG WITH OTHER PEOPLE: SOMEWHAT DIFFICULT
SUM OF ALL RESPONSES TO PHQ QUESTIONS 1-9: 9

## 2024-11-06 NOTE — PATIENT INSTRUCTIONS
treatment: Choosing a light box  Light therapy boxes can offer an effective treatment for seasonal affective disorder. Features such as light intensity, safety, cost and style are important considerations.    By [facility] Staff  Seasonal affective disorder (SAD) is a type of depression that typically occurs each year during fall and winter. Use of a light box can offer relief. But for some people, light therapy may be more effective when combined with another SAD treatment, such as an antidepressant or psychotherapy, also called talk therapy.    Light boxes are designed to deliver a therapeutic dose of bright light to treat symptoms of SAD. There are many different types of light boxes. All light boxes for SAD treatment are designed do the same thing, but one may work better for you than another.    Talk with your health care provider first  It's best to talk with your health care provider about choosing and using a light box. If you're experiencing both SAD and bipolar disorder, the advisability and timing of using a light box should be carefully reviewed with your health care provider. Increasing exposure too fast or using the light box for too long each time may induce manic symptoms if you have bipolar disorder.    If you have past or current eye problems such as glaucoma, cataracts or eye damage from diabetes, get advice from your eye specialist before starting light therapy.    Understanding a light box  A light therapy box mimics outdoor light. It's thought that this type of light may cause a chemical change in the brain that lifts your mood and eases other symptoms of SAD, such as being tired most of the time and sleeping too much.    Generally, the light box should:    Provide an exposure to 10,000 lux of light  Produce as little UV light as possible  Typical recommendations include using the light box:    Within the first hour of waking up in the morning  For about 20 to 30 minutes  About 16 to 24 inches  (41 to 61 centimeters) from your face, but follow the 's instructions about distance  With eyes open, but not looking directly at the light  Light boxes aren't regulated by the Food and Drug Administration (FDA) for SAD treatment, so it's important to understand your options.    You can buy a light box without a prescription, but it's best to use it under the guidance of a health care provider and follow the 's guidelines. Your health care provider may recommend a specific light box. Most health insurance plans don't cover the cost.    What to consider  Here are some questions to think about when buying a light box for seasonal affective disorder:    Is it made specifically to treat SAD? If not, it may not help your depression. Some light therapy lamps are designed for skin disorders -- not for SAD. Lamps used for skin disorders mainly produce ultraviolet (UV) light and could damage your eyes if used incorrectly.  How bright is it? Light boxes produce different intensities of light. Brighter boxes will require less time to use each day, compared with dimmer boxes, to achieve the same effect. Usually, the recommended intensity of light is 10,000 lux.  How much UV light does it release? Light boxes for SAD should be designed to filter out most or all UV light. Contact the  for safety information if you have questions.  Can it cause eye damage? Some light boxes include features designed to protect the eyes. Make sure the light box filters out most or all UV light to avoid damaging your eyes. Ask your eye specialist for advice on choosing a light box if you have eye problems such as glaucoma, cataracts or eye damage from diabetes.  Is it the style you need? Light boxes come in different shapes and sizes, with varied features. Some look like upright lamps, while others are small and rectangular. Smaller light boxes are more portable and easier to pack if you're traveling during fall  and winter. The effectiveness of a light box depends on daily use, so buy one that's convenient for you.  Can you put it in the right location? Think about where you'll want to place your light box and what you might do during its use, such as reading, doing a craft or watching TV. Check the 's instructions, so you receive the right amount of light at the proper distance.  Talk to your health care provider about light box options and recommendations, so you get one that's best suited to your needs.

## 2024-11-06 NOTE — PROGRESS NOTES
"  Assessment & Plan   Problem List Items Addressed This Visit       Gender dysphoria - Primary    Relevant Medications    syringe, disposable, (B-D SYRINGE LUER-NEETA) 1 ML MISC    needle, disp, (BD ECLIPSE NEEDLE) 21G X 1\" MISC    estradiol valerate (DELESTROGEN) 10 MG/ML OIL injection    Bipolar 2 disorder (H)    Panlobular emphysema (H)   Patient is here today with her partner.  She has gender dysphoria and is status post orchiectomy, and has had minimal depth vaginoplasty done.  She is planning to have top surgery.  She also shares with me that when she is off of probation she plans to move with her partner down to [place].    Bipolar disorder patient has noticed a little bit of some worsening in depression with the onset of fall.  They are currently on Depakote and quetiapine.  She does not feel like she needs to make any changes in her medications at this time.  They would be reasonable to try adding a phototherapy light for seasonal affective disorder however cautioned that we would want to make sure that she and her partner watching for signs of increased bushra.    Emphysema patient reports that symptoms are doing well with the Spiriva and Dulera.  Will continue these.    The longitudinal plan of care for the diagnosis(es)/condition(s) as documented were addressed during this visit. Due to the added complexity in care, I will continue to support iKm in the subsequent management and with ongoing continuity of care.    We will plan to follow-up in 6 months, sooner if needed.  Did renew the patient's estradiol today.  We also reviewed patient's recent labs.        Verna Alvarez is a 49 year old, presenting for the following health issues:  Follow Up        11/6/2024    11:29 AM   Additional Questions   Roomed by Lynne     History of Present Illness       Reason for visit:  Follow up    She eats 0-1 servings of fruits and vegetables daily.She consumes 0 sweetened beverage(s) daily.She exercises with enough " "effort to increase her heart rate 9 or less minutes per day.  She exercises with enough effort to increase her heart rate 3 or less days per week.   She is taking medications regularly.                     Objective    /64 (BP Location: Right arm, Patient Position: Sitting)   Pulse 71   Temp 98  F (36.7  C) (Tympanic)   Resp 16   Ht 1.676 m (5' 6\")   Wt 70.6 kg (155 lb 9.6 oz)   SpO2 97%   BMI 25.11 kg/m    Body mass index is 25.11 kg/m .  Physical Exam               Signed Electronically by: Kaylan Neville MD    "

## 2025-01-29 ENCOUNTER — PATIENT OUTREACH (OUTPATIENT)
Dept: CARE COORDINATION | Facility: CLINIC | Age: 50
End: 2025-01-29
Payer: MEDICAID

## 2025-01-30 DIAGNOSIS — Z12.11 COLON CANCER SCREENING: ICD-10-CM

## 2025-02-12 ENCOUNTER — ORDERS ONLY (AUTO-RELEASED) (OUTPATIENT)
Dept: ADMISSION | Facility: CLINIC | Age: 50
End: 2025-02-12
Payer: MEDICAID

## 2025-02-12 DIAGNOSIS — Z12.11 COLON CANCER SCREENING: ICD-10-CM

## 2025-02-14 PROCEDURE — 82274 ASSAY TEST FOR BLOOD FECAL: CPT | Performed by: FAMILY MEDICINE

## 2025-02-19 LAB — HEMOCCULT STL QL IA: NEGATIVE

## 2025-03-04 ENCOUNTER — LAB (OUTPATIENT)
Dept: LAB | Facility: CLINIC | Age: 50
End: 2025-03-04
Payer: MEDICAID

## 2025-03-04 DIAGNOSIS — E78.5 HYPERLIPIDEMIA LDL GOAL <100: Primary | ICD-10-CM

## 2025-03-04 DIAGNOSIS — F64.9 GENDER DYSPHORIA: ICD-10-CM

## 2025-03-04 DIAGNOSIS — Z79.899 MEDICATION MANAGEMENT: ICD-10-CM

## 2025-03-04 LAB
BASOPHILS # BLD AUTO: 0 10E3/UL (ref 0–0.2)
BASOPHILS NFR BLD AUTO: 0 %
EOSINOPHIL # BLD AUTO: 0.2 10E3/UL (ref 0–0.7)
EOSINOPHIL NFR BLD AUTO: 3 %
ERYTHROCYTE [DISTWIDTH] IN BLOOD BY AUTOMATED COUNT: 12.7 % (ref 10–15)
HCT VFR BLD AUTO: 41 % (ref 35–53)
HGB BLD-MCNC: 13.8 G/DL (ref 11.7–17.7)
IMM GRANULOCYTES # BLD: 0 10E3/UL
IMM GRANULOCYTES NFR BLD: 0 %
LYMPHOCYTES # BLD AUTO: 2.2 10E3/UL (ref 0.8–5.3)
LYMPHOCYTES NFR BLD AUTO: 24 %
MCH RBC QN AUTO: 31.9 PG (ref 26.5–33)
MCHC RBC AUTO-ENTMCNC: 33.7 G/DL (ref 31.5–36.5)
MCV RBC AUTO: 95 FL (ref 78–100)
MONOCYTES # BLD AUTO: 0.8 10E3/UL (ref 0–1.3)
MONOCYTES NFR BLD AUTO: 8 %
NEUTROPHILS # BLD AUTO: 6 10E3/UL (ref 1.6–8.3)
NEUTROPHILS NFR BLD AUTO: 65 %
PLATELET # BLD AUTO: 227 10E3/UL (ref 150–450)
RBC # BLD AUTO: 4.33 10E6/UL (ref 3.8–5.9)
WBC # BLD AUTO: 9.2 10E3/UL (ref 4–11)

## 2025-03-04 PROCEDURE — 85025 COMPLETE CBC W/AUTO DIFF WBC: CPT | Mod: QW

## 2025-03-04 PROCEDURE — 36415 COLL VENOUS BLD VENIPUNCTURE: CPT

## 2025-03-04 PROCEDURE — 80053 COMPREHEN METABOLIC PANEL: CPT

## 2025-03-04 PROCEDURE — 80061 LIPID PANEL: CPT

## 2025-03-04 PROCEDURE — 82670 ASSAY OF TOTAL ESTRADIOL: CPT

## 2025-03-05 LAB
ALBUMIN SERPL BCG-MCNC: 4.2 G/DL (ref 3.5–5.2)
ALP SERPL-CCNC: 62 U/L (ref 40–150)
ALT SERPL W P-5'-P-CCNC: 8 U/L (ref 0–70)
ANION GAP SERPL CALCULATED.3IONS-SCNC: 10 MMOL/L (ref 7–15)
AST SERPL W P-5'-P-CCNC: 22 U/L (ref 0–45)
BILIRUB SERPL-MCNC: 0.3 MG/DL
BUN SERPL-MCNC: 10.8 MG/DL (ref 6–20)
CALCIUM SERPL-MCNC: 9.6 MG/DL (ref 8.8–10.4)
CHLORIDE SERPL-SCNC: 100 MMOL/L (ref 98–107)
CHOLEST SERPL-MCNC: 155 MG/DL
CREAT SERPL-MCNC: 0.78 MG/DL (ref 0.51–1.17)
EGFRCR SERPLBLD CKD-EPI 2021: >90 ML/MIN/1.73M2
ESTRADIOL SERPL-MCNC: 188 PG/ML
FASTING STATUS PATIENT QL REPORTED: YES
FASTING STATUS PATIENT QL REPORTED: YES
GLUCOSE SERPL-MCNC: 83 MG/DL (ref 70–99)
HCO3 SERPL-SCNC: 27 MMOL/L (ref 22–29)
HDLC SERPL-MCNC: 40 MG/DL
LDLC SERPL CALC-MCNC: 90 MG/DL
NONHDLC SERPL-MCNC: 115 MG/DL
POTASSIUM SERPL-SCNC: 4.6 MMOL/L (ref 3.4–5.3)
PROT SERPL-MCNC: 7 G/DL (ref 6.4–8.3)
SODIUM SERPL-SCNC: 137 MMOL/L (ref 135–145)
TRIGL SERPL-MCNC: 125 MG/DL

## 2025-03-19 ASSESSMENT — ANXIETY QUESTIONNAIRES
7. FEELING AFRAID AS IF SOMETHING AWFUL MIGHT HAPPEN: NOT AT ALL
1. FEELING NERVOUS, ANXIOUS, OR ON EDGE: NOT AT ALL
3. WORRYING TOO MUCH ABOUT DIFFERENT THINGS: SEVERAL DAYS
GAD7 TOTAL SCORE: 4
6. BECOMING EASILY ANNOYED OR IRRITABLE: NOT AT ALL
5. BEING SO RESTLESS THAT IT IS HARD TO SIT STILL: SEVERAL DAYS
GAD7 TOTAL SCORE: 4
7. FEELING AFRAID AS IF SOMETHING AWFUL MIGHT HAPPEN: NOT AT ALL
2. NOT BEING ABLE TO STOP OR CONTROL WORRYING: SEVERAL DAYS
IF YOU CHECKED OFF ANY PROBLEMS ON THIS QUESTIONNAIRE, HOW DIFFICULT HAVE THESE PROBLEMS MADE IT FOR YOU TO DO YOUR WORK, TAKE CARE OF THINGS AT HOME, OR GET ALONG WITH OTHER PEOPLE: NOT DIFFICULT AT ALL
GAD7 TOTAL SCORE: 4
4. TROUBLE RELAXING: SEVERAL DAYS
8. IF YOU CHECKED OFF ANY PROBLEMS, HOW DIFFICULT HAVE THESE MADE IT FOR YOU TO DO YOUR WORK, TAKE CARE OF THINGS AT HOME, OR GET ALONG WITH OTHER PEOPLE?: NOT DIFFICULT AT ALL

## 2025-03-23 ASSESSMENT — PATIENT HEALTH QUESTIONNAIRE - PHQ9
SUM OF ALL RESPONSES TO PHQ QUESTIONS 1-9: 8
SUM OF ALL RESPONSES TO PHQ QUESTIONS 1-9: 8
10. IF YOU CHECKED OFF ANY PROBLEMS, HOW DIFFICULT HAVE THESE PROBLEMS MADE IT FOR YOU TO DO YOUR WORK, TAKE CARE OF THINGS AT HOME, OR GET ALONG WITH OTHER PEOPLE: NOT DIFFICULT AT ALL

## 2025-03-24 ENCOUNTER — OFFICE VISIT (OUTPATIENT)
Dept: FAMILY MEDICINE | Facility: CLINIC | Age: 50
End: 2025-03-24
Payer: MEDICAID

## 2025-03-24 VITALS
HEIGHT: 66 IN | WEIGHT: 159.7 LBS | DIASTOLIC BLOOD PRESSURE: 58 MMHG | OXYGEN SATURATION: 96 % | BODY MASS INDEX: 25.67 KG/M2 | SYSTOLIC BLOOD PRESSURE: 100 MMHG | RESPIRATION RATE: 16 BRPM | HEART RATE: 89 BPM

## 2025-03-24 DIAGNOSIS — J42 CHRONIC BRONCHITIS, UNSPECIFIED CHRONIC BRONCHITIS TYPE (H): ICD-10-CM

## 2025-03-24 DIAGNOSIS — F31.81 BIPOLAR 2 DISORDER (H): ICD-10-CM

## 2025-03-24 DIAGNOSIS — E34.9 HORMONE DEFICIENCY: ICD-10-CM

## 2025-03-24 DIAGNOSIS — Z87.891 HISTORY OF TOBACCO USE, PRESENTING HAZARDS TO HEALTH: Primary | ICD-10-CM

## 2025-03-24 DIAGNOSIS — J45.30 MILD PERSISTENT ASTHMA WITHOUT COMPLICATION: ICD-10-CM

## 2025-03-24 PROCEDURE — 99214 OFFICE O/P EST MOD 30 MIN: CPT | Performed by: FAMILY MEDICINE

## 2025-03-24 PROCEDURE — G2211 COMPLEX E/M VISIT ADD ON: HCPCS | Performed by: FAMILY MEDICINE

## 2025-03-24 RX ORDER — TIOTROPIUM BROMIDE 18 UG/1
CAPSULE ORAL; RESPIRATORY (INHALATION)
Qty: 90 CAPSULE | Refills: 3 | Status: SHIPPED | OUTPATIENT
Start: 2025-03-24

## 2025-03-24 RX ORDER — SYRINGE, DISPOSABLE, 1 ML
1 SYRINGE, EMPTY DISPOSABLE MISCELLANEOUS
Qty: 25 EACH | Refills: 3 | Status: SHIPPED | OUTPATIENT
Start: 2025-03-24

## 2025-03-24 RX ORDER — NEEDLES, SAFETY 18GX1 1/2"
NEEDLE, DISPOSABLE MISCELLANEOUS
Qty: 25 EACH | Refills: 2 | Status: SHIPPED | OUTPATIENT
Start: 2025-03-24 | End: 2025-03-24

## 2025-03-24 RX ORDER — DIVALPROEX SODIUM 250 MG/1
250 TABLET, FILM COATED, EXTENDED RELEASE ORAL DAILY
Qty: 90 TABLET | Refills: 1 | Status: SHIPPED | OUTPATIENT
Start: 2025-03-24

## 2025-03-24 RX ORDER — ESTRADIOL VALERATE 10 MG/ML
2 INJECTION INTRAMUSCULAR WEEKLY
Qty: 5 ML | Refills: 5 | Status: SHIPPED | OUTPATIENT
Start: 2025-03-24 | End: 2025-03-24

## 2025-03-24 RX ORDER — ALBUTEROL SULFATE 90 UG/1
2 INHALANT RESPIRATORY (INHALATION) EVERY 4 HOURS PRN
Qty: 36 G | Refills: 1 | Status: SHIPPED | OUTPATIENT
Start: 2025-03-24

## 2025-03-24 RX ORDER — ESTRADIOL VALERATE 10 MG/ML
2 INJECTION INTRAMUSCULAR WEEKLY
Qty: 5 ML | Refills: 5 | Status: SHIPPED | OUTPATIENT
Start: 2025-03-24

## 2025-03-24 RX ORDER — NEEDLES, SAFETY 18GX1 1/2"
NEEDLE, DISPOSABLE MISCELLANEOUS
Qty: 25 EACH | Refills: 2 | Status: SHIPPED | OUTPATIENT
Start: 2025-03-24

## 2025-03-24 RX ORDER — QUETIAPINE FUMARATE 50 MG/1
50 TABLET, EXTENDED RELEASE ORAL AT BEDTIME
Qty: 90 TABLET | Refills: 1 | Status: SHIPPED | OUTPATIENT
Start: 2025-03-24

## 2025-03-24 RX ORDER — SYRINGE, DISPOSABLE, 1 ML
1 SYRINGE, EMPTY DISPOSABLE MISCELLANEOUS
Qty: 25 EACH | Refills: 3 | Status: SHIPPED | OUTPATIENT
Start: 2025-03-24 | End: 2025-03-24

## 2025-03-24 RX ORDER — MOMETASONE FUROATE AND FORMOTEROL FUMARATE DIHYDRATE 100; 5 UG/1; UG/1
2 AEROSOL RESPIRATORY (INHALATION) 2 TIMES DAILY
Qty: 39 G | Refills: 3 | Status: SHIPPED | OUTPATIENT
Start: 2025-03-24

## 2025-03-24 NOTE — PROGRESS NOTES
"  Assessment & Plan   Problem List Items Addressed This Visit       Bipolar 2 disorder (H)    Relevant Medications    divalproex sodium extended-release (DEPAKOTE ER) 250 MG 24 hr tablet    QUEtiapine (SEROQUEL XR) 50 MG TB24 24 hr tablet    Mild persistent asthma without complication    Relevant Medications    albuterol (PROAIR HFA/PROVENTIL HFA/VENTOLIN HFA) 108 (90 Base) MCG/ACT inhaler    mometasone-formoterol (DULERA) 100-5 MCG/ACT inhaler    tiotropium (SPIRIVA HANDIHALER) 18 MCG inhaled capsule    Hormone deficiency    Relevant Medications    estradiol valerate (DELESTROGEN) 10 MG/ML OIL injection    needle, disp, (BD ECLIPSE NEEDLE) 21G X 1\" MISC    syringe, disposable, (B-D SYRINGE LUER-NEETA) 1 ML MISC    Other Relevant Orders    Comprehensive metabolic panel (BMP + Alb, Alk Phos, ALT, AST, Total. Bili, TP)    CBC with Platelets & Differential    Estradiol    Prolactin     Other Visit Diagnoses       History of tobacco use, presenting hazards to health    -  Primary    Chronic bronchitis, unspecified chronic bronchitis type (H)        Relevant Medications    albuterol (PROAIR HFA/PROVENTIL HFA/VENTOLIN HFA) 108 (90 Base) MCG/ACT inhaler         Assessment & Plan  History of tobacco use, presenting hazards to health  - Not a candidate for lung cancer screening due to a 19.5 pack-year history, which is below the 20 pack-year threshold.  - Revisit lung cancer screening eligibility next year.    Bipolar 2 disorder (H)  - Mood stable with current Depakote regimen.  - Renew Depakote prescription.    Chronic bronchitis, unspecified chronic bronchitis type (H)  - Renew albuterol prescription.    Mild persistent asthma without complication  - Dulera helps with breathing.  - Continue Dulera.     Hormone deficiency reviewed recent labs with patient will continue with current dose of estradiol.    The longitudinal plan of care for the diagnosis(es)/condition(s) as documented were addressed during this visit. Due to the " "added complexity in care, I will continue to support Kim in the subsequent management and with ongoing continuity of care.           3/19/2025    10:10 AM   JOSE ANTONIO-7 SCORE   Total Score 4 (minimal anxiety)   Total Score 4        Patient-reported            7/2/2024     1:45 PM 11/6/2024    11:12 AM 3/23/2025     7:27 PM   PHQ   PHQ-9 Total Score 6 9  8    Q9: Thoughts of better off dead/self-harm past 2 weeks Not at all Not at all Not at all       Patient-reported      BMI  Estimated body mass index is 25.78 kg/m  as calculated from the following:    Height as of this encounter: 1.676 m (5' 6\").    Weight as of this encounter: 72.4 kg (159 lb 11.2 oz).   Weight management plan: Discussed healthy diet and exercise guidelines          Subjective   Kim is a 49 year old, presenting for the following health issues:  Recheck Medication  History of Present Illness-  - Kim Padilla, age 49, male, pronouns she/her  - Smoking history: started smoking at age 10, legally at 14, with a 39-year history of smoking  - Average smoking habit: approximately half a pack per day, not meeting criteria for lung cancer screening  - Mood stable with current Depakote use  - Sleep described as fair, with some disturbances due to partner's habits     Hormone deficiency currently on Depo estradiol.  Reviewed recent labs with patient.  She is here today with her partner      3/24/2025     2:47 PM   Additional Questions   Roomed by Lynne     History of Present Illness       Reason for visit:  Follow up   She is taking medications regularly.   - Kim Padilla, age 49, male, pronouns she/her  - Smoking history: started smoking at age 10, legally at 14, with a 39-year history of smoking  - Average smoking habit: approximately half a pack per day, not meeting criteria for lung cancer screening  - Mood stable with current Depakote use  - Sleep described as fair, with some disturbances due to partner's habits                  Objective    BP " "100/58 (BP Location: Right arm, Patient Position: Sitting)   Pulse 89   Resp 16   Ht 1.676 m (5' 6\")   Wt 72.4 kg (159 lb 11.2 oz)   SpO2 96%   BMI 25.78 kg/m    Body mass index is 25.78 kg/m .  Physical Exam               Signed Electronically by: Kaylan Neville MD    "

## 2025-04-01 ENCOUNTER — TELEPHONE (OUTPATIENT)
Dept: PLASTIC SURGERY | Facility: CLINIC | Age: 50
End: 2025-04-01
Payer: MEDICAID

## 2025-04-01 NOTE — TELEPHONE ENCOUNTER
Patient confirmed scheduled appointment:  Date: 07/09/2025  Time: 200 pm   Visit type: New Top   Provider:   Location: [facility]   Testing/imaging: n/a  Additional notes: new top

## 2025-04-05 ENCOUNTER — HEALTH MAINTENANCE LETTER (OUTPATIENT)
Age: 50
End: 2025-04-05

## 2025-04-07 ENCOUNTER — PATIENT OUTREACH (OUTPATIENT)
Dept: CARE COORDINATION | Facility: CLINIC | Age: 50
End: 2025-04-07
Payer: MEDICAID

## 2025-07-08 NOTE — TELEPHONE ENCOUNTER
FUTURE VISIT INFORMATION      FUTURE VISIT INFORMATION:  Date: 10/22/25  Time: 3:15  Location: [facility] [place]  REFERRAL INFORMATION:  Referring provider:    Referring providers clinic:    Reason for visit/diagnosis  New Top    RECORDS REQUESTED FROM:       Clinic name Comments Records Status Imaging Status

## 2025-08-01 PROCEDURE — 82947 ASSAY GLUCOSE BLOOD QUANT: CPT | Performed by: FAMILY MEDICINE

## 2025-08-01 PROCEDURE — 82670 ASSAY OF TOTAL ESTRADIOL: CPT | Performed by: FAMILY MEDICINE

## 2025-08-01 PROCEDURE — 84146 ASSAY OF PROLACTIN: CPT | Performed by: FAMILY MEDICINE

## 2025-08-06 ENCOUNTER — OFFICE VISIT (OUTPATIENT)
Dept: FAMILY MEDICINE | Facility: CLINIC | Age: 50
End: 2025-08-06
Payer: MEDICAID

## 2025-08-06 VITALS
SYSTOLIC BLOOD PRESSURE: 100 MMHG | RESPIRATION RATE: 16 BRPM | TEMPERATURE: 98.8 F | WEIGHT: 155.9 LBS | HEART RATE: 87 BPM | BODY MASS INDEX: 23.63 KG/M2 | DIASTOLIC BLOOD PRESSURE: 64 MMHG | HEIGHT: 68 IN | OXYGEN SATURATION: 97 %

## 2025-08-06 DIAGNOSIS — J42 CHRONIC BRONCHITIS, UNSPECIFIED CHRONIC BRONCHITIS TYPE (H): ICD-10-CM

## 2025-08-06 DIAGNOSIS — M75.02 ADHESIVE CAPSULITIS OF LEFT SHOULDER: Primary | ICD-10-CM

## 2025-08-06 DIAGNOSIS — F31.81 BIPOLAR 2 DISORDER (H): ICD-10-CM

## 2025-08-06 DIAGNOSIS — J45.30 MILD PERSISTENT ASTHMA WITHOUT COMPLICATION: ICD-10-CM

## 2025-08-06 DIAGNOSIS — M54.50 CHRONIC BILATERAL LOW BACK PAIN WITHOUT SCIATICA: ICD-10-CM

## 2025-08-06 DIAGNOSIS — E34.9 HORMONE DEFICIENCY: ICD-10-CM

## 2025-08-06 DIAGNOSIS — G89.29 CHRONIC BILATERAL LOW BACK PAIN WITHOUT SCIATICA: ICD-10-CM

## 2025-08-06 PROCEDURE — G2211 COMPLEX E/M VISIT ADD ON: HCPCS | Performed by: FAMILY MEDICINE

## 2025-08-06 PROCEDURE — 99214 OFFICE O/P EST MOD 30 MIN: CPT | Performed by: FAMILY MEDICINE

## 2025-08-06 RX ORDER — ESTRADIOL VALERATE 10 MG/ML
2 INJECTION INTRAMUSCULAR WEEKLY
Qty: 5 ML | Refills: 5 | Status: SHIPPED | OUTPATIENT
Start: 2025-08-06

## 2025-08-06 RX ORDER — CHLORHEXIDINE GLUCONATE ORAL RINSE 1.2 MG/ML
SOLUTION DENTAL
COMMUNITY
Start: 2025-07-03

## 2025-08-06 RX ORDER — TIOTROPIUM BROMIDE 18 UG/1
CAPSULE ORAL; RESPIRATORY (INHALATION)
Qty: 90 CAPSULE | Refills: 3 | Status: SHIPPED | OUTPATIENT
Start: 2025-08-06

## 2025-08-06 RX ORDER — NEEDLES, SAFETY 18GX1 1/2"
NEEDLE, DISPOSABLE MISCELLANEOUS
Qty: 25 EACH | Refills: 2 | Status: SHIPPED | OUTPATIENT
Start: 2025-08-06

## 2025-08-06 RX ORDER — ALBUTEROL SULFATE 90 UG/1
2 INHALANT RESPIRATORY (INHALATION) EVERY 4 HOURS PRN
Qty: 36 G | Refills: 1 | Status: SHIPPED | OUTPATIENT
Start: 2025-08-06

## 2025-08-06 RX ORDER — QUETIAPINE FUMARATE 50 MG/1
50 TABLET, EXTENDED RELEASE ORAL AT BEDTIME
Qty: 90 TABLET | Refills: 1 | Status: SHIPPED | OUTPATIENT
Start: 2025-08-06

## 2025-08-06 RX ORDER — SYRINGE, DISPOSABLE, 1 ML
1 SYRINGE, EMPTY DISPOSABLE MISCELLANEOUS
Qty: 25 EACH | Refills: 3 | Status: SHIPPED | OUTPATIENT
Start: 2025-08-06

## 2025-08-06 RX ORDER — IBUPROFEN 800 MG/1
800 TABLET, FILM COATED ORAL EVERY 8 HOURS PRN
Qty: 30 TABLET | Refills: 5 | Status: SHIPPED | OUTPATIENT
Start: 2025-08-06

## 2025-08-06 RX ORDER — MOMETASONE FUROATE AND FORMOTEROL FUMARATE DIHYDRATE 100; 5 UG/1; UG/1
2 AEROSOL RESPIRATORY (INHALATION) 2 TIMES DAILY
Qty: 39 G | Refills: 3 | Status: SHIPPED | OUTPATIENT
Start: 2025-08-06

## 2025-08-06 RX ORDER — DIVALPROEX SODIUM 250 MG/1
250 TABLET, FILM COATED, EXTENDED RELEASE ORAL DAILY
Qty: 90 TABLET | Refills: 1 | Status: SHIPPED | OUTPATIENT
Start: 2025-08-06

## 2025-08-06 ASSESSMENT — PATIENT HEALTH QUESTIONNAIRE - PHQ9
SUM OF ALL RESPONSES TO PHQ QUESTIONS 1-9: 8
10. IF YOU CHECKED OFF ANY PROBLEMS, HOW DIFFICULT HAVE THESE PROBLEMS MADE IT FOR YOU TO DO YOUR WORK, TAKE CARE OF THINGS AT HOME, OR GET ALONG WITH OTHER PEOPLE: SOMEWHAT DIFFICULT
SUM OF ALL RESPONSES TO PHQ QUESTIONS 1-9: 8

## 2025-08-07 ENCOUNTER — PATIENT OUTREACH (OUTPATIENT)
Dept: CARE COORDINATION | Facility: CLINIC | Age: 50
End: 2025-08-07
Payer: MEDICAID

## 2025-10-22 ENCOUNTER — PRE VISIT (OUTPATIENT)
Dept: PLASTIC SURGERY | Facility: CLINIC | Age: 50
End: 2025-10-22

## (undated) DEVICE — SOL WATER IRRIG 1000ML BOTTLE 2F7114

## (undated) DEVICE — DRAPE LEGGINGS CLEAR 8430

## (undated) DEVICE — DRAPE IOBAN INCISE 23X17" 6650EZ

## (undated) DEVICE — CATH TRAY FOLEY SURESTEP 16FR WDRAIN BAG STLK LATEX A300316A

## (undated) DEVICE — SPONGE RAY-TEC 4X8" 7318

## (undated) DEVICE — TRAY PREP DRY SKIN SCRUB 067

## (undated) DEVICE — DRAIN JACKSON PRATT RESERVOIR 100ML SU130-1305

## (undated) DEVICE — DRAIN JACKSON PRATT 15FR ROUND SU130-1323

## (undated) DEVICE — SYR BULB IRRIG DOVER 60 ML LATEX FREE 67000

## (undated) DEVICE — STRAP KNEE/BODY 31143004

## (undated) DEVICE — DRAPE UNDER BUTTOCK 8483

## (undated) DEVICE — SYR 10ML LL W/O NDL 302995

## (undated) DEVICE — GLOVE BIOGEL PI MICRO SZ 7.5 48575

## (undated) DEVICE — TUBING SUCTION MEDI-VAC SOFT 3/16"X20' N520A

## (undated) DEVICE — SU VICRYL 3-0 SH 27" UND J416H

## (undated) DEVICE — PAD FLOOR SURGSAFE 30X40" 83030

## (undated) DEVICE — LABEL MEDICATION SYSTEM 3303-P

## (undated) DEVICE — BLADE CLIPPER SGL USE 9680

## (undated) DEVICE — SU MONOCRYL 3-0 SH 27" UND Y416H

## (undated) DEVICE — Device

## (undated) DEVICE — PREP POVIDONE-IODINE 7.5% SCRUB 4OZ BOTTLE MDS093945

## (undated) DEVICE — ESU GROUND PAD UNIVERSAL W/O CORD

## (undated) DEVICE — SU SILK 0 SH 30" K834H

## (undated) DEVICE — DRSG XEROFORM 5X9" 8884431605

## (undated) DEVICE — DRAPE SPLIT SHEET 77X108 REINFORCED 29436

## (undated) DEVICE — DRAPE POUCH IRR 1016

## (undated) DEVICE — SU ETHILON 3-0 PS-1 18" 1663H

## (undated) DEVICE — DRSG KERLIX 4 1/2"X4YDS ROLL 6730

## (undated) DEVICE — DRSG ABDOMINAL 07 1/2X8" 7197D

## (undated) DEVICE — CATH FOLEY 16FR 5ML LUBRICATH LATEX 0165L16

## (undated) DEVICE — STPL SKIN 35W ROTATING HEAD PRW35

## (undated) DEVICE — DRSG ADAPTIC 3X8" 6113

## (undated) DEVICE — ESU LIGASURE IMPACT OPEN SEALER/DVDR CVD LG JAW LF4418

## (undated) DEVICE — LINEN TOWEL PACK X30 5481

## (undated) DEVICE — SU PROLENE 0 CT 30" 8434H

## (undated) DEVICE — NDL 25GA 1.5" 305838

## (undated) DEVICE — PREP POVIDONE-IODINE 10% SOLUTION 4OZ BOTTLE MDS093944

## (undated) DEVICE — SU PDS II 3-0 FS-1 27" CLR  Z442H

## (undated) DEVICE — SOL NACL 0.9% IRRIG 1000ML BOTTLE 2F7124

## (undated) DEVICE — SYR 10ML FINGER CONTROL W/O NDL 309695

## (undated) DEVICE — COVER CAMERA IN-LIGHT DISP LT-C02

## (undated) DEVICE — SU DERMABOND PRINEO 42CM CLR422US

## (undated) DEVICE — SU PDS II 3-0 SH 27" Z316H

## (undated) DEVICE — SURGICEL POWDER ABSORBABLE HEMOSTAT 3GM 3013SP

## (undated) DEVICE — GOWN IMPERVIOUS SPECIALTY XLG/XLONG 32474

## (undated) DEVICE — DRAPE POUCH INSTRUMENT 1018

## (undated) DEVICE — BLADE KNIFE SURG 15 371115

## (undated) DEVICE — LIGHT HANDLE X2

## (undated) DEVICE — SPONGE LAP 18X18" X8435

## (undated) DEVICE — CATH PLUG W/CAP 000076

## (undated) DEVICE — ESU CORD BIPOLAR GREEN 10-4000

## (undated) RX ORDER — EPHEDRINE SULFATE 50 MG/ML
INJECTION, SOLUTION INTRAMUSCULAR; INTRAVENOUS; SUBCUTANEOUS
Status: DISPENSED
Start: 2023-10-09

## (undated) RX ORDER — FENTANYL CITRATE 50 UG/ML
INJECTION, SOLUTION INTRAMUSCULAR; INTRAVENOUS
Status: DISPENSED
Start: 2023-10-09

## (undated) RX ORDER — HEPARIN SODIUM 5000 [USP'U]/.5ML
INJECTION, SOLUTION INTRAVENOUS; SUBCUTANEOUS
Status: DISPENSED
Start: 2023-10-09

## (undated) RX ORDER — FENTANYL CITRATE-0.9 % NACL/PF 10 MCG/ML
PLASTIC BAG, INJECTION (ML) INTRAVENOUS
Status: DISPENSED
Start: 2023-10-09

## (undated) RX ORDER — OXYCODONE HYDROCHLORIDE 5 MG/1
TABLET ORAL
Status: DISPENSED
Start: 2023-10-09

## (undated) RX ORDER — ACETAMINOPHEN 325 MG/1
TABLET ORAL
Status: DISPENSED
Start: 2023-10-09

## (undated) RX ORDER — HYDROMORPHONE HYDROCHLORIDE 1 MG/ML
INJECTION, SOLUTION INTRAMUSCULAR; INTRAVENOUS; SUBCUTANEOUS
Status: DISPENSED
Start: 2023-10-09

## (undated) RX ORDER — BUPIVACAINE HYDROCHLORIDE AND EPINEPHRINE 5; 5 MG/ML; UG/ML
INJECTION, SOLUTION PERINEURAL
Status: DISPENSED
Start: 2023-10-09

## (undated) RX ORDER — PROPOFOL 10 MG/ML
INJECTION, EMULSION INTRAVENOUS
Status: DISPENSED
Start: 2023-10-09